# Patient Record
Sex: FEMALE | Race: WHITE | Employment: UNEMPLOYED | URBAN - METROPOLITAN AREA
[De-identification: names, ages, dates, MRNs, and addresses within clinical notes are randomized per-mention and may not be internally consistent; named-entity substitution may affect disease eponyms.]

---

## 2017-05-15 ENCOUNTER — OFFICE VISIT (OUTPATIENT)
Dept: FAMILY MEDICINE CLINIC | Age: 57
End: 2017-05-15
Payer: COMMERCIAL

## 2017-05-15 VITALS
DIASTOLIC BLOOD PRESSURE: 91 MMHG | OXYGEN SATURATION: 100 % | BODY MASS INDEX: 20.49 KG/M2 | HEART RATE: 86 BPM | RESPIRATION RATE: 10 BRPM | HEIGHT: 65 IN | WEIGHT: 123 LBS | SYSTOLIC BLOOD PRESSURE: 136 MMHG

## 2017-05-15 DIAGNOSIS — G43.719 INTRACTABLE CHRONIC MIGRAINE WITHOUT AURA AND WITHOUT STATUS MIGRAINOSUS: Primary | ICD-10-CM

## 2017-05-15 PROCEDURE — 99203 OFFICE O/P NEW LOW 30 MIN: CPT | Performed by: FAMILY MEDICINE

## 2017-05-15 RX ORDER — VERAPAMIL HYDROCHLORIDE 360 MG/1
360 CAPSULE, DELAYED RELEASE PELLETS ORAL NIGHTLY
Qty: 30 CAPSULE | Refills: 1 | Status: SHIPPED | OUTPATIENT
Start: 2017-05-15 | End: 2017-07-24 | Stop reason: DRUGHIGH

## 2017-05-15 RX ORDER — ACETAMINOPHEN 500 MG
500 TABLET ORAL 2 TIMES DAILY
COMMUNITY
End: 2020-01-02

## 2017-05-15 RX ORDER — MULTIVIT-MIN/IRON/FOLIC ACID/K 18-600-40
CAPSULE ORAL DAILY
COMMUNITY
End: 2017-12-11 | Stop reason: SDUPTHER

## 2017-05-15 RX ORDER — VERAPAMIL HYDROCHLORIDE 360 MG/1
360 CAPSULE, DELAYED RELEASE PELLETS ORAL NIGHTLY
COMMUNITY
End: 2017-05-15 | Stop reason: SDUPTHER

## 2017-05-15 RX ORDER — TOPIRAMATE 100 MG/1
TABLET, FILM COATED ORAL 2 TIMES DAILY
COMMUNITY
Start: 2017-03-20 | End: 2017-06-20 | Stop reason: SDUPTHER

## 2017-05-15 RX ORDER — BUTALBITAL, ACETAMINOPHEN AND CAFFEINE 50; 325; 40 MG/1; MG/1; MG/1
1 TABLET ORAL EVERY 6 HOURS PRN
Qty: 60 TABLET | Refills: 3 | Status: SHIPPED | OUTPATIENT
Start: 2017-05-15 | End: 2018-09-17 | Stop reason: SDUPTHER

## 2017-05-15 RX ORDER — AMITRIPTYLINE HYDROCHLORIDE 25 MG/1
TABLET, FILM COATED ORAL DAILY
COMMUNITY
Start: 2017-03-27 | End: 2018-02-02

## 2017-05-15 RX ORDER — HYDROCHLOROTHIAZIDE 12.5 MG/1
12.5 CAPSULE, GELATIN COATED ORAL DAILY
COMMUNITY
End: 2017-10-16 | Stop reason: ALTCHOICE

## 2017-05-15 RX ORDER — LOSARTAN POTASSIUM 50 MG/1
50 TABLET ORAL DAILY
COMMUNITY
End: 2018-01-12

## 2017-05-15 RX ORDER — LOSARTAN POTASSIUM 100 MG/1
50 TABLET ORAL
COMMUNITY
Start: 2017-03-24 | End: 2017-05-15 | Stop reason: DRUGHIGH

## 2017-05-15 RX ORDER — CYCLOBENZAPRINE HCL 5 MG
TABLET ORAL
COMMUNITY
Start: 2017-04-11 | End: 2017-10-16

## 2017-05-15 RX ORDER — BUSPIRONE HYDROCHLORIDE 15 MG/1
15 TABLET ORAL 2 TIMES DAILY
Status: ON HOLD | COMMUNITY
Start: 2017-03-24 | End: 2018-12-27

## 2017-05-15 ASSESSMENT — PATIENT HEALTH QUESTIONNAIRE - PHQ9
SUM OF ALL RESPONSES TO PHQ QUESTIONS 1-9: 0
SUM OF ALL RESPONSES TO PHQ9 QUESTIONS 1 & 2: 0
1. LITTLE INTEREST OR PLEASURE IN DOING THINGS: 0
2. FEELING DOWN, DEPRESSED OR HOPELESS: 0

## 2017-05-17 RX ORDER — SUMATRIPTAN 100 MG/1
100 TABLET, FILM COATED ORAL
Qty: 9 TABLET | Refills: 1 | Status: SHIPPED | OUTPATIENT
Start: 2017-05-17 | End: 2017-12-11 | Stop reason: SDUPTHER

## 2017-05-17 RX ORDER — POTASSIUM CHLORIDE 1.5 G/1.77G
10 POWDER, FOR SOLUTION ORAL 3 TIMES DAILY
Qty: 22 EACH | Refills: 1 | Status: SHIPPED | OUTPATIENT
Start: 2017-05-17 | End: 2017-10-16 | Stop reason: ALTCHOICE

## 2017-06-20 RX ORDER — TOPIRAMATE 100 MG/1
100 TABLET, FILM COATED ORAL 2 TIMES DAILY
Qty: 60 TABLET | Refills: 2 | Status: SHIPPED | OUTPATIENT
Start: 2017-06-20 | End: 2017-09-28 | Stop reason: SDUPTHER

## 2017-07-24 ENCOUNTER — OFFICE VISIT (OUTPATIENT)
Dept: NEUROLOGY | Age: 57
End: 2017-07-24
Payer: COMMERCIAL

## 2017-07-24 VITALS
HEART RATE: 73 BPM | SYSTOLIC BLOOD PRESSURE: 125 MMHG | BODY MASS INDEX: 21.13 KG/M2 | HEIGHT: 65 IN | WEIGHT: 126.8 LBS | DIASTOLIC BLOOD PRESSURE: 85 MMHG

## 2017-07-24 DIAGNOSIS — G43.719 INTRACTABLE CHRONIC MIGRAINE WITHOUT AURA AND WITHOUT STATUS MIGRAINOSUS: Primary | ICD-10-CM

## 2017-07-24 PROCEDURE — 99204 OFFICE O/P NEW MOD 45 MIN: CPT | Performed by: PSYCHIATRY & NEUROLOGY

## 2017-07-25 ENCOUNTER — HOSPITAL ENCOUNTER (OUTPATIENT)
Facility: CLINIC | Age: 57
Discharge: HOME | End: 2017-07-25
Payer: COMMERCIAL

## 2017-07-25 ENCOUNTER — HOSPITAL ENCOUNTER (OUTPATIENT)
Dept: GENERAL RADIOLOGY | Facility: CLINIC | Age: 57
Discharge: HOME | End: 2017-07-25
Payer: COMMERCIAL

## 2017-07-25 DIAGNOSIS — G43.719 INTRACTABLE CHRONIC MIGRAINE WITHOUT AURA AND WITHOUT STATUS MIGRAINOSUS: ICD-10-CM

## 2017-07-25 LAB
ALT SERPL-CCNC: 31 U/L (ref 5–33)
ANION GAP SERPL CALCULATED.3IONS-SCNC: 14 MMOL/L (ref 9–17)
BUN BLDV-MCNC: 17 MG/DL (ref 6–20)
CHLORIDE BLD-SCNC: 105 MMOL/L (ref 98–107)
CO2: 25 MMOL/L (ref 20–31)
CREAT SERPL-MCNC: 0.7 MG/DL (ref 0.5–0.9)
GFR AFRICAN AMERICAN: >60 ML/MIN
GFR NON-AFRICAN AMERICAN: >60 ML/MIN
GFR SERPL CREATININE-BSD FRML MDRD: NORMAL ML/MIN/{1.73_M2}
GFR SERPL CREATININE-BSD FRML MDRD: NORMAL ML/MIN/{1.73_M2}
HCT VFR BLD CALC: 46.1 % (ref 36–46)
HEMOGLOBIN: 15.9 G/DL (ref 12–16)
MCH RBC QN AUTO: 31.3 PG (ref 26–34)
MCHC RBC AUTO-ENTMCNC: 34.5 G/DL (ref 31–37)
MCV RBC AUTO: 90.9 FL (ref 80–100)
PDW BLD-RTO: 13.3 % (ref 12.5–15.4)
PLATELET # BLD: 167 K/UL (ref 140–450)
PMV BLD AUTO: 10 FL (ref 6–12)
POTASSIUM SERPL-SCNC: 3.9 MMOL/L (ref 3.7–5.3)
RBC # BLD: 5.07 M/UL (ref 4–5.2)
SODIUM BLD-SCNC: 144 MMOL/L (ref 135–144)
TSH SERPL DL<=0.05 MIU/L-ACNC: 1.16 MIU/L (ref 0.3–5)
WBC # BLD: 7 K/UL (ref 3.5–11)

## 2017-07-25 PROCEDURE — 84520 ASSAY OF UREA NITROGEN: CPT

## 2017-07-25 PROCEDURE — 82746 ASSAY OF FOLIC ACID SERUM: CPT

## 2017-07-25 PROCEDURE — 72040 X-RAY EXAM NECK SPINE 2-3 VW: CPT

## 2017-07-25 PROCEDURE — 84443 ASSAY THYROID STIM HORMONE: CPT

## 2017-07-25 PROCEDURE — 84460 ALANINE AMINO (ALT) (SGPT): CPT

## 2017-07-25 PROCEDURE — 82607 VITAMIN B-12: CPT

## 2017-07-25 PROCEDURE — 85027 COMPLETE CBC AUTOMATED: CPT

## 2017-07-25 PROCEDURE — 80051 ELECTROLYTE PANEL: CPT

## 2017-07-25 PROCEDURE — 36415 COLL VENOUS BLD VENIPUNCTURE: CPT

## 2017-07-25 PROCEDURE — 85651 RBC SED RATE NONAUTOMATED: CPT

## 2017-07-25 PROCEDURE — 82565 ASSAY OF CREATININE: CPT

## 2017-07-26 ENCOUNTER — TELEPHONE (OUTPATIENT)
Dept: NEUROLOGY | Age: 57
End: 2017-07-26

## 2017-07-26 LAB
FOLATE: >20 NG/ML
SEDIMENTATION RATE, ERYTHROCYTE: 1 MM (ref 0–20)
VITAMIN B-12: 1069 PG/ML (ref 211–946)

## 2017-08-21 ENCOUNTER — OFFICE VISIT (OUTPATIENT)
Dept: FAMILY MEDICINE CLINIC | Age: 57
End: 2017-08-21
Payer: COMMERCIAL

## 2017-08-21 VITALS
SYSTOLIC BLOOD PRESSURE: 134 MMHG | HEART RATE: 77 BPM | HEIGHT: 65 IN | OXYGEN SATURATION: 100 % | WEIGHT: 124 LBS | BODY MASS INDEX: 20.66 KG/M2 | DIASTOLIC BLOOD PRESSURE: 84 MMHG

## 2017-08-21 DIAGNOSIS — I10 ESSENTIAL HYPERTENSION: ICD-10-CM

## 2017-08-21 DIAGNOSIS — G44.221 CHRONIC TENSION-TYPE HEADACHE, INTRACTABLE: Primary | ICD-10-CM

## 2017-08-21 PROCEDURE — 99214 OFFICE O/P EST MOD 30 MIN: CPT | Performed by: FAMILY MEDICINE

## 2017-08-21 RX ORDER — BACLOFEN 10 MG/1
10 TABLET ORAL 3 TIMES DAILY
Qty: 60 TABLET | Refills: 0 | Status: SHIPPED | OUTPATIENT
Start: 2017-08-21 | End: 2017-10-18 | Stop reason: SDUPTHER

## 2017-08-21 RX ORDER — TRAMADOL HYDROCHLORIDE 50 MG/1
50 TABLET ORAL DAILY
Qty: 20 TABLET | Refills: 0 | Status: SHIPPED | OUTPATIENT
Start: 2017-08-21 | End: 2017-09-10

## 2017-08-26 ENCOUNTER — HOSPITAL ENCOUNTER (OUTPATIENT)
Facility: CLINIC | Age: 57
Discharge: HOME | End: 2017-08-26
Payer: COMMERCIAL

## 2017-08-26 DIAGNOSIS — G44.221 CHRONIC TENSION-TYPE HEADACHE, INTRACTABLE: ICD-10-CM

## 2017-08-26 LAB
ABSOLUTE EOS #: 0.3 K/UL (ref 0–0.4)
ABSOLUTE LYMPH #: 2.4 K/UL (ref 1–4.8)
ABSOLUTE MONO #: 0.5 K/UL (ref 0.1–1.2)
ALBUMIN SERPL-MCNC: 4.4 G/DL (ref 3.5–5.2)
ALBUMIN/GLOBULIN RATIO: 1.4 (ref 1–2.5)
ALP BLD-CCNC: 96 U/L (ref 35–104)
ALT SERPL-CCNC: 28 U/L (ref 5–33)
ANION GAP SERPL CALCULATED.3IONS-SCNC: 10 MMOL/L (ref 9–17)
AST SERPL-CCNC: 26 U/L
BASOPHILS # BLD: 0 %
BASOPHILS ABSOLUTE: 0 K/UL (ref 0–0.2)
BILIRUB SERPL-MCNC: 0.6 MG/DL (ref 0.3–1.2)
BILIRUBIN URINE: NEGATIVE
BUN BLDV-MCNC: 12 MG/DL (ref 6–20)
BUN/CREAT BLD: NORMAL (ref 9–20)
CALCIUM SERPL-MCNC: 9.8 MG/DL (ref 8.6–10.4)
CHLORIDE BLD-SCNC: 105 MMOL/L (ref 98–107)
CHOLESTEROL/HDL RATIO: 3.8
CHOLESTEROL: 200 MG/DL
CO2: 26 MMOL/L (ref 20–31)
COLOR: YELLOW
COMMENT UA: NORMAL
CREAT SERPL-MCNC: 0.64 MG/DL (ref 0.5–0.9)
DIFFERENTIAL TYPE: ABNORMAL
EOSINOPHILS RELATIVE PERCENT: 5 %
GFR AFRICAN AMERICAN: >60 ML/MIN
GFR NON-AFRICAN AMERICAN: >60 ML/MIN
GFR SERPL CREATININE-BSD FRML MDRD: NORMAL ML/MIN/{1.73_M2}
GFR SERPL CREATININE-BSD FRML MDRD: NORMAL ML/MIN/{1.73_M2}
GLUCOSE BLD-MCNC: 90 MG/DL (ref 70–99)
GLUCOSE URINE: NEGATIVE
HCT VFR BLD CALC: 48.4 % (ref 36–46)
HDLC SERPL-MCNC: 53 MG/DL
HEMOGLOBIN: 16.5 G/DL (ref 12–16)
KETONES, URINE: NEGATIVE
LDL CHOLESTEROL: 135 MG/DL (ref 0–130)
LEUKOCYTE ESTERASE, URINE: NEGATIVE
LYMPHOCYTES # BLD: 42 %
MCH RBC QN AUTO: 31.2 PG (ref 26–34)
MCHC RBC AUTO-ENTMCNC: 34 G/DL (ref 31–37)
MCV RBC AUTO: 91.7 FL (ref 80–100)
MONOCYTES # BLD: 9 %
NITRITE, URINE: NEGATIVE
PDW BLD-RTO: 13.5 % (ref 12.5–15.4)
PH UA: 8 (ref 5–8)
PLATELET # BLD: 204 K/UL (ref 140–450)
PLATELET ESTIMATE: ABNORMAL
PMV BLD AUTO: 10.3 FL (ref 6–12)
POTASSIUM SERPL-SCNC: 4.1 MMOL/L (ref 3.7–5.3)
PROTEIN UA: NEGATIVE
RBC # BLD: 5.28 M/UL (ref 4–5.2)
RBC # BLD: ABNORMAL 10*6/UL
SEG NEUTROPHILS: 44 %
SEGMENTED NEUTROPHILS ABSOLUTE COUNT: 2.5 K/UL (ref 1.8–7.7)
SODIUM BLD-SCNC: 141 MMOL/L (ref 135–144)
SPECIFIC GRAVITY UA: 1.01 (ref 1–1.03)
THYROXINE, FREE: 1.08 NG/DL (ref 0.93–1.7)
TOTAL PROTEIN: 7.5 G/DL (ref 6.4–8.3)
TRIGL SERPL-MCNC: 62 MG/DL
TSH SERPL DL<=0.05 MIU/L-ACNC: 1.75 MIU/L (ref 0.3–5)
TURBIDITY: CLEAR
URINE HGB: NEGATIVE
UROBILINOGEN, URINE: NORMAL
VLDLC SERPL CALC-MCNC: ABNORMAL MG/DL (ref 1–30)
WBC # BLD: 5.7 K/UL (ref 3.5–11)
WBC # BLD: ABNORMAL 10*3/UL

## 2017-08-26 PROCEDURE — 84443 ASSAY THYROID STIM HORMONE: CPT

## 2017-08-26 PROCEDURE — 36415 COLL VENOUS BLD VENIPUNCTURE: CPT

## 2017-08-26 PROCEDURE — 80061 LIPID PANEL: CPT

## 2017-08-26 PROCEDURE — 80053 COMPREHEN METABOLIC PANEL: CPT

## 2017-08-26 PROCEDURE — 85025 COMPLETE CBC W/AUTO DIFF WBC: CPT

## 2017-08-26 PROCEDURE — 84439 ASSAY OF FREE THYROXINE: CPT

## 2017-08-26 PROCEDURE — 86376 MICROSOMAL ANTIBODY EACH: CPT

## 2017-08-26 PROCEDURE — 81003 URINALYSIS AUTO W/O SCOPE: CPT

## 2017-08-28 LAB — THYROID PEROXIDASE (TPO) AB: 32.8 IU/ML (ref 0–35)

## 2017-08-29 RX ORDER — PRAVASTATIN SODIUM 40 MG
40 TABLET ORAL DAILY
Qty: 30 TABLET | Refills: 3 | Status: SHIPPED | OUTPATIENT
Start: 2017-08-29 | End: 2017-12-11 | Stop reason: SDUPTHER

## 2017-09-28 DIAGNOSIS — G43.719 INTRACTABLE CHRONIC MIGRAINE WITHOUT AURA AND WITHOUT STATUS MIGRAINOSUS: ICD-10-CM

## 2017-09-29 RX ORDER — TOPIRAMATE 100 MG/1
100 TABLET, FILM COATED ORAL 2 TIMES DAILY
Qty: 60 TABLET | Refills: 2 | Status: SHIPPED | OUTPATIENT
Start: 2017-09-29 | End: 2017-11-14

## 2017-10-16 ENCOUNTER — OFFICE VISIT (OUTPATIENT)
Dept: NEUROLOGY | Age: 57
End: 2017-10-16
Payer: COMMERCIAL

## 2017-10-16 ENCOUNTER — TELEPHONE (OUTPATIENT)
Dept: NEUROLOGY | Age: 57
End: 2017-10-16

## 2017-10-16 VITALS
HEIGHT: 65 IN | WEIGHT: 126.4 LBS | BODY MASS INDEX: 21.06 KG/M2 | DIASTOLIC BLOOD PRESSURE: 91 MMHG | HEART RATE: 71 BPM | SYSTOLIC BLOOD PRESSURE: 148 MMHG

## 2017-10-16 DIAGNOSIS — G43.719 INTRACTABLE CHRONIC MIGRAINE WITHOUT AURA AND WITHOUT STATUS MIGRAINOSUS: Primary | ICD-10-CM

## 2017-10-16 PROCEDURE — 99213 OFFICE O/P EST LOW 20 MIN: CPT | Performed by: PSYCHIATRY & NEUROLOGY

## 2017-10-16 NOTE — PROGRESS NOTES
Stiffness: absent, Muscle pain: absent, Joint pain: absent, Restless legs: absent   DERMATOLOGIC Hair loss: absent, Skin changes: absent   NEUROLOGIC Memory loss: absent, Confusion: absent, Seizures: absent Trouble walking or imbalance: absent, Dizziness: absent, Weakness: absent, Numbness: absent, Tremor: absent, Spasm: absent, Speech difficulty: absent, Headache: absent, Light sensitivity: absent   PSYCHIATRIC Anxiety: absent, Hallucination: absent, Mood disorder: absent   HEMATOLOGIC Abnormal bleeding: absent, Anemia: absent, Clotting disorder: absent, Lymph gland changes: absent                   Outpatient Prescriptions Marked as Taking for the 10/16/17 encounter (Office Visit) with Santino Ayala MD   Medication Sig Dispense Refill    topiramate (TOPAMAX) 100 MG tablet Take 1 tablet by mouth 2 times daily 60 tablet 2    pravastatin (PRAVACHOL) 40 MG tablet Take 1 tablet by mouth daily 30 tablet 3    baclofen (LIORESAL) 10 MG tablet Take 1 tablet by mouth 3 times daily 60 tablet 0    busPIRone (BUSPAR) 15 MG tablet 15 mg 2 times daily       amitriptyline (ELAVIL) 25 MG tablet daily       losartan (COZAAR) 50 MG tablet Take 50 mg by mouth daily      Cholecalciferol (VITAMIN D) 2000 UNITS CAPS capsule Take by mouth daily      Calcium Citrate-Vitamin D (CALCIUM CITRATE + PO) Take by mouth 2 times daily      acetaminophen (TYLENOL) 500 MG tablet Take 500 mg by mouth 2 times daily Indications: 2 PO BID      butalbital-acetaminophen-caffeine (FIORICET, ESGIC) -40 MG per tablet Take 1 tablet by mouth every 6 hours as needed for Headaches or Migraine 60 tablet 3                                          PHYSICAL EXAMINATION                                              .                                                                                                     General Appearance:  Alert, cooperative, no signs of distress despite a headache calendar showing 3-4 headaches a week, appears stated her complaints. Considering the mental confusion and the lack of effectiveness of her Topamax I have advised a gradual withdrawal by 50 mg weekly until she is off the drug. There is no need for additional investigation but I have come up with a few other ideas about treatment. For starters, once the Topamax is discontinued, she will escalate the Amitriptyline to 50 mg at bedtime. She will keep up the Melatonin and Magnesium Oxide and consider another nonprescription preventative agent, Butterbur. She can purchase this at a [employer] store. Meanwhile, she will keep up her headache calendars and the Imitrex abortive therapy since it continues to be effective. I also suggested she try a cup of coffee or a beverage with caffeine as an abortive agent when there is a headache breakthrough. Should none of this prove effective, I have asked her to reconsider a trial of Botox injections since she only had one course of this therapy and sometimes it takes more than the first treatment to make a difference. In the meantime, I will remain available for questions or any other issues that may arise and she will return for reevaluation in about 3 months time.

## 2017-10-16 NOTE — COMMUNICATION BODY
HPI:        Your patient, Robb Max returns for continuing neurologic care of her chronic migraine disorder. As you recall, she suffers with a long-standing migraine disorder evaluated by multiple neurologists in the past.  A MRI of the brain and MRA of the brain in October 2016 were normal.  A cervical spine x-ray in July 2017 revealed straightening with mild degenerative changes, mostly at C4-C6. Laboratories in July 2017 included a normal ESR, B12, folate, TSH, electrolytes, BUN, creatinine, ALT and CBC. She has been treated in the past with a myriad of migraine preventative and abortive therapies. On last visit, Verapamil was producing leg swelling and constipation. Consequently, it was reduced and withdrawn in July 2017. She was also advised to begin a trial of Melatonin and Magnesium Oxide. Unfortunately nothing has changed. She still suffers 3-4 times a week with a migraine and sometimes they last as long as 3-4 days. Unfortunately she has tried just about every medication and even Botox therapy without lasting benefit and doesn't know what to do next. The Topamax is producing some side effects of mental confusion and she would like to stop this. Fortunately, there have been no new medical or surgical issues nor any recent trauma, infection/fever or intoxication to complicate matters.                                                                                                           REVIEW OF SYSTEMS    CONSTITUTIONAL Weight: absent, Appetite: absent, Fatigue: absent      HEENT Ears: ringing in ears, Eyes: no corrective lenses, Visual disturbance: absent   RESPIRATORY Shortness of breath: absent, Cough: absent   CARDIOVASCULAR Chest pain: absent, Leg swelling :absent      GI Constipation: absent, Diarrhea: absent, Swallowing change: absent       Urinary frequency: absent, Urinary urgency: absent, Urinary incontinence: absent   MUSCULOSKELETAL Neck pain: absent, Back pain: absent,

## 2017-10-16 NOTE — LETTER
tablet Take 1 tablet by mouth every 6 hours as needed for Headaches or Migraine 60 tablet 3                                          PHYSICAL EXAMINATION                                              .                                                                                                     General Appearance:  Alert, cooperative, no signs of distress despite a headache calendar showing 3-4 headaches a week, appears stated age, fit for age, well dressed and groomed    Head:  Normocephalic, no signs of trauma   Eyes:  Conjunctiva/corneas clear;  eyelids intact   Ears:  Normal external ear and canals   Nose: Nares normal, mucosa normal, no drainage    Throat: Lips and tongue normal; teeth normal;  gums normal   Neck: Supple neck with intact flexion, extension and rotation;   trachea midline;  no adenopathy;   thyroid: not enlarged;   no carotid pulse abnormality   Back:   Symmetric, no curvature, ROM adequate   Lungs:   Respirations unlabored   Chest Wall:  No deformity   Heart:  Regular rate and rhythm   Abdomen:   No masses   Extremities: Extremities normal, no cyanosis, no edema   Pulses: Symmetric over head and neck   Skin: Skin color, texture normal, no rashes, no lesions   Lymph nodes: Cervical, supraclavicular nodes normal                                         NEUROLOGIC EXAMINATION    MENTAL STATUS: Alert, oriented, intact memory, no confusion, normal speech, normal language, no hallucination or delusion   CRANIAL NERVES: II     -      Visual fields intact to confrontation  III,IV,VI -  EOMs full, no afferent defect, no                      MICHAEL, no ptosis  V     -     Normal facial sensation  VII    -     Normal facial symmetry  VIII   -     Intact hearing  IX,X -     Symmetrical palate  XI    -     Symmetrical shoulder shrug  XII   -     Midline tongue, no atrophy    MOTOR FUNCTION: Normal bulk, normal tone, normal power;  no involuntary movements, no tremor SENSORY FUNCTION: Normal touch, normal pin, normal vibration   CEREBELLAR FUNCTION: Intact fine motor control over upper limbs and good alternating movements   REFLEX FUNCTION: Symmetric, no perverted reflex   STATION and GAIT Normal station, normal gait                ASSESSMENT and  PLAN:      In summary, your patient, Manuel Galeana appears unchanged. There are no new lateralizing or localizing neurologic deficits or signs of toxicity from her medications despite her complaints. Considering the mental confusion and the lack of effectiveness of her Topamax I have advised a gradual withdrawal by 50 mg weekly until she is off the drug. There is no need for additional investigation but I have come up with a few other ideas about treatment. For starters, once the Topamax is discontinued, she will escalate the Amitriptyline to 50 mg at bedtime. She will keep up the Melatonin and Magnesium Oxide and consider another nonprescription preventative agent, Butterbur. She can purchase this at a [employer] store. Meanwhile, she will keep up her headache calendars and the Imitrex abortive therapy since it continues to be effective. I also suggested she try a cup of coffee or a beverage with caffeine as an abortive agent when there is a headache breakthrough. Should none of this prove effective, I have asked her to reconsider a trial of Botox injections since she only had one course of this therapy and sometimes it takes more than the first treatment to make a difference. In the meantime, I will remain available for questions or any other issues that may arise and she will return for reevaluation in about 3 months time. If you have questions, please do not hesitate to call me. I look forward to following Maycol Pandya along with you.     Sincerely,        Thai Hodges MD

## 2017-10-18 DIAGNOSIS — G44.221 CHRONIC TENSION-TYPE HEADACHE, INTRACTABLE: ICD-10-CM

## 2017-10-19 RX ORDER — BACLOFEN 10 MG/1
10 TABLET ORAL 3 TIMES DAILY
Qty: 60 TABLET | Refills: 0 | Status: SHIPPED | OUTPATIENT
Start: 2017-10-19 | End: 2017-12-11 | Stop reason: SDUPTHER

## 2017-11-14 ENCOUNTER — HOSPITAL ENCOUNTER (EMERGENCY)
Facility: CLINIC | Age: 57
Discharge: HOME | End: 2017-11-14
Attending: EMERGENCY MEDICINE
Payer: COMMERCIAL

## 2017-11-14 ENCOUNTER — APPOINTMENT (OUTPATIENT)
Dept: CT IMAGING | Facility: CLINIC | Age: 57
End: 2017-11-14
Payer: COMMERCIAL

## 2017-11-14 VITALS
DIASTOLIC BLOOD PRESSURE: 85 MMHG | TEMPERATURE: 98.3 F | WEIGHT: 125 LBS | HEIGHT: 65 IN | BODY MASS INDEX: 20.83 KG/M2 | SYSTOLIC BLOOD PRESSURE: 142 MMHG | OXYGEN SATURATION: 96 % | HEART RATE: 86 BPM | RESPIRATION RATE: 11 BRPM

## 2017-11-14 DIAGNOSIS — I10 HYPERTENSION, UNSPECIFIED TYPE: Primary | ICD-10-CM

## 2017-11-14 LAB
ABSOLUTE EOS #: 0.3 K/UL (ref 0–0.4)
ABSOLUTE IMMATURE GRANULOCYTE: ABNORMAL K/UL (ref 0–0.3)
ABSOLUTE LYMPH #: 2.3 K/UL (ref 1–4.8)
ABSOLUTE MONO #: 0.6 K/UL (ref 0.1–1.2)
ALBUMIN SERPL-MCNC: 4.3 G/DL (ref 3.5–5.2)
ALBUMIN/GLOBULIN RATIO: 1.3 (ref 1–2.5)
ALP BLD-CCNC: 94 U/L (ref 35–104)
ALT SERPL-CCNC: 60 U/L (ref 5–33)
ANION GAP SERPL CALCULATED.3IONS-SCNC: 15 MMOL/L (ref 9–17)
AST SERPL-CCNC: 41 U/L
BASOPHILS # BLD: 1 %
BASOPHILS ABSOLUTE: 0 K/UL (ref 0–0.2)
BILIRUB SERPL-MCNC: 0.2 MG/DL (ref 0.3–1.2)
BILIRUBIN URINE: NEGATIVE
BUN BLDV-MCNC: 15 MG/DL (ref 6–20)
BUN/CREAT BLD: ABNORMAL (ref 9–20)
CALCIUM SERPL-MCNC: 9.9 MG/DL (ref 8.6–10.4)
CHLORIDE BLD-SCNC: 103 MMOL/L (ref 98–107)
CO2: 24 MMOL/L (ref 20–31)
COLOR: YELLOW
COMMENT UA: NORMAL
CREAT SERPL-MCNC: 0.6 MG/DL (ref 0.5–0.9)
DIFFERENTIAL TYPE: ABNORMAL
EKG ATRIAL RATE: 89 BPM
EKG P AXIS: 73 DEGREES
EKG P-R INTERVAL: 164 MS
EKG Q-T INTERVAL: 352 MS
EKG QRS DURATION: 84 MS
EKG QTC CALCULATION (BAZETT): 428 MS
EKG R AXIS: 59 DEGREES
EKG T AXIS: 49 DEGREES
EKG VENTRICULAR RATE: 89 BPM
EOSINOPHILS RELATIVE PERCENT: 4 %
GFR AFRICAN AMERICAN: >60 ML/MIN
GFR NON-AFRICAN AMERICAN: >60 ML/MIN
GFR SERPL CREATININE-BSD FRML MDRD: ABNORMAL ML/MIN/{1.73_M2}
GFR SERPL CREATININE-BSD FRML MDRD: ABNORMAL ML/MIN/{1.73_M2}
GLUCOSE BLD-MCNC: 113 MG/DL (ref 70–99)
GLUCOSE URINE: NEGATIVE
HCT VFR BLD CALC: 49.5 % (ref 36–46)
HEMOGLOBIN: 17 G/DL (ref 12–16)
IMMATURE GRANULOCYTES: ABNORMAL %
KETONES, URINE: NEGATIVE
LEUKOCYTE ESTERASE, URINE: NEGATIVE
LYMPHOCYTES # BLD: 33 %
MCH RBC QN AUTO: 31.6 PG (ref 26–34)
MCHC RBC AUTO-ENTMCNC: 34.3 G/DL (ref 31–37)
MCV RBC AUTO: 92.1 FL (ref 80–100)
MONOCYTES # BLD: 9 %
NITRITE, URINE: NEGATIVE
PDW BLD-RTO: 12.2 % (ref 12.5–15.4)
PH UA: 6.5 (ref 5–8)
PLATELET # BLD: 243 K/UL (ref 140–450)
PLATELET ESTIMATE: ABNORMAL
PMV BLD AUTO: 9.7 FL (ref 6–12)
POTASSIUM SERPL-SCNC: 3.8 MMOL/L (ref 3.7–5.3)
PROTEIN UA: NEGATIVE
RBC # BLD: 5.37 M/UL (ref 4–5.2)
RBC # BLD: ABNORMAL 10*6/UL
SEG NEUTROPHILS: 53 %
SEGMENTED NEUTROPHILS ABSOLUTE COUNT: 3.7 K/UL (ref 1.8–7.7)
SODIUM BLD-SCNC: 142 MMOL/L (ref 135–144)
SPECIFIC GRAVITY UA: 1.01 (ref 1–1.03)
TOTAL PROTEIN: 7.5 G/DL (ref 6.4–8.3)
TROPONIN INTERP: NORMAL
TROPONIN T: <0.03 NG/ML
TURBIDITY: CLEAR
URINE HGB: NEGATIVE
UROBILINOGEN, URINE: NORMAL
WBC # BLD: 6.9 K/UL (ref 3.5–11)
WBC # BLD: ABNORMAL 10*3/UL

## 2017-11-14 PROCEDURE — 36415 COLL VENOUS BLD VENIPUNCTURE: CPT

## 2017-11-14 PROCEDURE — 6370000000 HC RX 637 (ALT 250 FOR IP): Performed by: EMERGENCY MEDICINE

## 2017-11-14 PROCEDURE — 99284 EMERGENCY DEPT VISIT MOD MDM: CPT

## 2017-11-14 PROCEDURE — 84484 ASSAY OF TROPONIN QUANT: CPT

## 2017-11-14 PROCEDURE — 85025 COMPLETE CBC W/AUTO DIFF WBC: CPT

## 2017-11-14 PROCEDURE — 80053 COMPREHEN METABOLIC PANEL: CPT

## 2017-11-14 PROCEDURE — 70450 CT HEAD/BRAIN W/O DYE: CPT

## 2017-11-14 PROCEDURE — 93005 ELECTROCARDIOGRAM TRACING: CPT

## 2017-11-14 RX ORDER — CLONIDINE HYDROCHLORIDE 0.1 MG/1
0.1 TABLET ORAL ONCE
Status: COMPLETED | OUTPATIENT
Start: 2017-11-14 | End: 2017-11-14

## 2017-11-14 RX ADMIN — CLONIDINE HYDROCHLORIDE 0.1 MG: 0.1 TABLET ORAL at 18:31

## 2017-11-14 ASSESSMENT — PAIN DESCRIPTION - PAIN TYPE: TYPE: ACUTE PAIN

## 2017-11-14 ASSESSMENT — PAIN SCALES - GENERAL: PAINLEVEL_OUTOF10: 3

## 2017-11-14 ASSESSMENT — PAIN DESCRIPTION - LOCATION: LOCATION: HEAD

## 2017-11-14 NOTE — ED PROVIDER NOTES
[facility] ED  [address] [address]  Phone: [phone]      Pt Name: Vidal Clark  MRN: [id number]  [place] 1960  Date of evaluation: 2017      CHIEF COMPLAINT       Chief Complaint   Patient presents with    Hypertension       HISTORY OF PRESENT ILLNESS    55-year-old female with a history of hypertension presents to the emergency department today concern of hypertension. She read an article in the newspaper today stating that they have lowered the standards for evaluation treatment and management of hypertension. This prompted her to check her blood pressure. It was considerably elevated. She called her doctor. Her doctor told her to go and have it evaluated at a pharmacy. It was still elevated and therefore she presents here. She does report a headache which is global.  She commonly gets headaches but this is a little worse than normal.  She does describe palpitations in her chest.  No nausea vomiting or diaphoresis. She tells me that she is on a blood pressure medication but has not checked her blood pressure in some time. She was on additional medications but stopped them because of a cough. No additional blood pressure medication was used to replace the one that was discontinued. REVIEW OF SYSTEMS     Review of Systems   All other systems reviewed and are negative. PAST MEDICAL HISTORY    has a past medical history of Acid reflux; Anemia; Hypertension; Migraine; Mitral valve prolapse; and Osteoarthritis. SURGICAL HISTORY      has a past surgical history that includes Ovary removal; Toe Surgery;  section; and other surgical history.     CURRENT MEDICATIONS       Previous Medications    ACETAMINOPHEN (TYLENOL) 500 MG TABLET    Take 500 mg by mouth 2 times daily Indications: 2 PO BID    AMITRIPTYLINE (ELAVIL) 25 MG TABLET    daily     BACLOFEN (LIORESAL) 10 MG TABLET    Take 1 tablet by mouth 3 times daily    BUSPIRONE (BUSPAR) 15 MG TABLET cervical adenopathy. Neurological: She is alert and oriented to person, place, and time. No cranial nerve deficit. She exhibits normal muscle tone. Coordination normal.   Skin: Skin is warm and dry. No rash noted. I did not undress this patient. Psychiatric: She has a normal mood and affect. Her behavior is normal. Thought content normal.   Vitals reviewed. DIFFERENTIAL DIAGNOSIS/ MDM:   Patient here is neurologically intact. I will investigate for end organ damage. I will do serial evaluations. I will treat her elevated blood pressure here with clonidine orally. DIAGNOSTIC RESULTS     EKG:  EKG as interpreted by myself showing a normal sinus rhythm without any acute ST segment changes. Rate axis and intervals are all normal.    RADIOLOGY:   No results found. LABS:  No results found for this visit on 11/14/17. EMERGENCY DEPARTMENT COURSE:     The patient was given the following medications:  Orders Placed This Encounter   Medications    cloNIDine (CATAPRES) tablet 0.1 mg        Vitals:    Vitals:    11/14/17 1806   BP: (!) 197/107   Pulse: 106   Resp: 16   Temp: 98.3 °F (36.8 °C)   TempSrc: Oral   SpO2: 100%   Weight: 56.7 kg (125 lb)   Height: 5' 5\" (1.651 m)     -------------------------  BP: (!) 197/107, Temp: 98.3 °F (36.8 °C), Pulse: 106, Resp: 16      Re-evaluation Notes  It is now 1915 hrs. and I have endorsed this patient to Dr. Fawn Leon. Please see his addendum.     (Please note that portions of this note were completed with a voice recognition program.  Efforts were made to edit the dictations but occasionally words are mis-transcribed.)    Essie Mcarthur MD, F.A.C.E.P, F.A.A.E.M  Emergency Physician Attending          Essie Mcarthur MD  11/14/17 9124

## 2017-11-15 ENCOUNTER — OFFICE VISIT (OUTPATIENT)
Dept: FAMILY MEDICINE CLINIC | Age: 57
End: 2017-11-15
Payer: COMMERCIAL

## 2017-11-15 VITALS
DIASTOLIC BLOOD PRESSURE: 95 MMHG | BODY MASS INDEX: 20.83 KG/M2 | HEART RATE: 91 BPM | WEIGHT: 125 LBS | SYSTOLIC BLOOD PRESSURE: 171 MMHG | OXYGEN SATURATION: 99 % | HEIGHT: 65 IN

## 2017-11-15 DIAGNOSIS — I10 ESSENTIAL HYPERTENSION: Primary | ICD-10-CM

## 2017-11-15 PROCEDURE — 99213 OFFICE O/P EST LOW 20 MIN: CPT | Performed by: FAMILY MEDICINE

## 2017-11-15 RX ORDER — CALCIUM CARBONATE/VITAMIN D3 500-10/5ML
400 LIQUID (ML) ORAL
COMMUNITY
End: 2022-06-20

## 2017-11-15 RX ORDER — SPIRONOLACTONE 50 MG/1
50 TABLET, FILM COATED ORAL DAILY
Qty: 30 TABLET | Refills: 3 | Status: SHIPPED | OUTPATIENT
Start: 2017-11-15 | End: 2018-01-12 | Stop reason: SDUPTHER

## 2017-11-15 NOTE — PROGRESS NOTES
General FM note    Suri Alejo is a 62 y.o. female who presents today for follow up on her  medical conditions as noted below. Suri Alejo is c/o of   Chief Complaint   Patient presents with    Follow[facility] from [facility]     ED follow up from  [facility]       Patient Active Problem List:     Intractable chronic migraine without aura and without status migrainosus     Past Medical History:   Diagnosis Date    Acid reflux     Anemia     Hypertension     Migraine     Mitral valve prolapse     Osteoarthritis       Past Surgical History:   Procedure Laterality Date     SECTION      OTHER SURGICAL HISTORY      uterine ablation    OVARY REMOVAL      TOE SURGERY       Family History   Problem Relation Age of Onset    Neuropathy Mother     Heart Attack Father      Current Outpatient Prescriptions   Medication Sig Dispense Refill    Magnesium Oxide 400 MG CAPS Take 400 mg by mouth      MELATONIN-PYRIDOXINE PO Take by mouth      spironolactone (ALDACTONE) 50 MG tablet Take 1 tablet by mouth daily 30 tablet 3    Misc.  Devices MISC 1 each by Does not apply route once for 1 dose 1 each 0    baclofen (LIORESAL) 10 MG tablet Take 1 tablet by mouth 3 times daily 60 tablet 0    pravastatin (PRAVACHOL) 40 MG tablet Take 1 tablet by mouth daily 30 tablet 3    busPIRone (BUSPAR) 15 MG tablet 15 mg 2 times daily       amitriptyline (ELAVIL) 25 MG tablet daily       losartan (COZAAR) 50 MG tablet Take 50 mg by mouth daily      Cholecalciferol (VITAMIN D) 2000 UNITS CAPS capsule Take by mouth daily      Calcium Citrate-Vitamin D (CALCIUM CITRATE + PO) Take by mouth 2 times daily      acetaminophen (TYLENOL) 500 MG tablet Take 500 mg by mouth 2 times daily Indications: 2 PO BID      butalbital-acetaminophen-caffeine (FIORICET, ESGIC) -40 MG per tablet Take 1 tablet by mouth every 6 hours as needed for Headaches or Migraine 60 tablet 3    SUMAtriptan (IMITREX) 100 MG tablet Take 1 tablet by mouth once as needed for Migraine Indications: 50/100mg prn 9 tablet 1     No current facility-administered medications for this visit. ALLERGIES:    Allergies   Allergen Reactions    Percocet [Oxycodone-Acetaminophen] Hives       Social History   Substance Use Topics    Smoking status: Never Smoker    Smokeless tobacco: Never Used    Alcohol use No      Body mass index is 20.8 kg/m². BP (!) 171/95   Pulse 91   Ht 5' 5\" (1.651 m)   Wt 125 lb (56.7 kg)   SpO2 99%   BMI 20.80 kg/m²     Subjective:      HPI    49-year-old female coming in today because of high blood pressure. She was seen in the ER just some days ago because resident paper that she needs to check her blood pressure when she checked her blood pressure to blood pressure was quite high. The patient has been taking care off her 1year-old twins and also 11year-old grand kid. She tells me that quite a lot of work. Patient takes care of them 10 days hour. Hypertension  Patient is here for evaluation of elevated blood pressures. Age at onset of elevated blood pressure:  ??. Cardiac symptoms: none. Patient denies chest pain, chest pressure/discomfort, exertional chest pressure/discomfort, irregular heart beat, lower extremity edema and near-syncope. Cardiovascular risk factors: none. Use of agents associated with hypertension: none. History of target organ damage: none. Patient tells me that she was in multiple blood pressure medication before and she always had side effects. She does not want to see cardiologist.  She also tells me that she has on and off, some chest discomfort but this was worked up in the ER - the EKG was with normal limits. Review of Systems   Constitutional: Negative for fever and unexpected weight change. Respiratory: Negative for cough and shortness of breath.   positive for chest discomfort which resolved. Cardiovascular: Negative for chest pain and leg swelling.    Gastrointestinal: Negative for diarrhea, constipation and blood in stool. Skin: Negative for color change and rash. Objective:   Physical Exam  Constitutional: VS (see above). General appearance: normal development, habitus and attention, no deformities. Patient is very stressed. Eyes: normal conjunctiva and lids. CAV: RRR, no RMG. No edema lower extremities. Pulmo: CTA bilateral, no CWR. Skin: no rashes, lesions or ulcers. Musculoskeletal: normal gait. Nails: no clubbing or cyanosis. Psychiatric: alert and oriented to place, time and person. Normal mood and affect. Assessment:      1. Essential hypertension  spironolactone (ALDACTONE) 50 MG tablet    Potassium       Plan:   Start patient on aldactone. I discussed with her side effects. I also did give her a printout about clonazepam.  This patient might be a candidate for more sedating/relaxing medication. Patient will call if blood pressure reading after starting new medication. See me back in 4 weeks. Call if there is anything needed. Call or return to clinic prn if these symptoms worsen or fail to improve as anticipated. I have reviewed the instructions with the patient, answering all questions to her satisfaction. Return in about 4 weeks (around 2017) for HTN. Orders Placed This Encounter   Procedures    Potassium     Standing Status:   Future     Standing Expiration Date:   11/15/2018     Orders Placed This Encounter   Medications    spironolactone (ALDACTONE) 50 MG tablet     Sig: Take 1 tablet by mouth daily     Dispense:  30 tablet     Refill:  3    Misc. Devices MISC     Si each by Does not apply route once for 1 dose     Dispense:  1 each     Refill:  0     Cefaly. D: chronic headaches.        Electronically signed by Lucy Banks MD on 11/15/2017 at 5:08 PM       (Please note that portions of this note were completed with a voice recognition program. Efforts were made to edit the dictations but occasionally words are mis-transcribed.)

## 2017-11-15 NOTE — PATIENT INSTRUCTIONS
Patient Education          spironolactone  Pronunciation:  spir ON oh LAK tone  Brand:  Aldactone  What is the most important information I should know about spironolactone? You should not use spironolactone if you have kidney disease, high levels of potassium in your blood, B[place]oneta's disease (an adrenal gland disorder), if you are unable to urinate, or if you are also taking eplerenone. In animal studies, spironolactone caused certain types of cancers or tumors. It is not known whether these effects would occur in people using this medicine. Ask your doctor about your risk. What is spironolactone? Spironolactone is a potassium-sparing diuretic (water pill) that prevents your body from absorbing too much salt and keeps your potassium levels from getting too low. Spironolactone is used to diagnose or treat a condition in which you have too much aldosterone in your body. Aldosterone is a hormone produced by your adrenal glands to help regulate the salt and water balance in your body. Spironolactone also treats fluid retention (edema) in people with congestive heart failure, cirrhosis of the liver, or a kidney disorder called nephrotic syndrome. This medication is also used to treat or prevent hypokalemia (low potassium levels in the blood). Spironolactone may also be used for purposes not listed in this medication guide. What should I discuss with my healthcare provider before taking spironolactone? You should not use spironolactone if you are allergic to it, or if you have:  · kidney disease, or if you are unable to urinate;  · Rudy's disease (an adrenal gland disorder);  · high levels of potassium in your blood (hyperkalemia); or  · if you are also taking eplerenone.   To make sure spironolactone is safe for you, tell your doctor if you have:  · liver disease;  · heart disease;  · an electrolyte imbalance (such as low levels of magnesium in your blood); or  · if you take an NSAID (nonsteroidal anti-inflammatory drug), cholestyramine, heparin, lithium, heart or blood pressure medication, potassium supplements, steroid medicine, or another diuretic. In animal studies, spironolactone caused certain types of tumors, some of which were cancerous. However, very high doses are used in animal studies. It is not known whether these effects would occur in people using regular doses. Ask your doctor about your risk. It is not known whether spironolactone will harm an unborn baby. Tell your doctor if you are pregnant or plan to become pregnant. Spironolactone can pass into breast milk and may harm a nursing baby. You should not breast-feed while using this medicine. How should I take spironolactone? Follow all directions on your prescription label. Do not take this medicine in larger or smaller amounts or for longer than recommended. While using spironolactone, you may need frequent blood tests. This medication can cause unusual results with certain medical tests. Tell any doctor who treats you that you are using spironolactone. If you need surgery, tell the surgeon ahead of time that you are using spironolactone. You may need to stop using the medicine for a short time. If you are being treated for high blood pressure, keep using this medication even if you feel well. High blood pressure often has no symptoms. You may need to use blood pressure medication for the rest of your life. Store at room temperature away from heat, light, and moisture. What happens if I miss a dose? Take the missed dose as soon as you remember. Skip the missed dose if it is almost time for your next scheduled dose. Do not take extra medicine to make up the missed dose. What happens if I overdose? Seek emergency medical attention or call the Poison Help line at 1-181.874.4526. What should I avoid while taking spironolactone? Drinking alcohol can increase certain side effects of spironolactone.   Do not use salt substitutes or low-sodium milk products that contain potassium. These products could cause your potassium levels to get too high while you are taking spironolactone. Avoid a high-salt diet. Too much salt will cause your body to retain water, which may reduce the effectiveness of this medicine. Spironolactone may impair your thinking or reactions. Be careful if you drive or do anything that requires you to be alert. Avoid becoming overheated or dehydrated during exercise and in hot weather. Follow your doctor's instructions about the type and amount of liquids you should drink. In some cases, drinking too much liquid can be as unsafe as not drinking enough. What are the possible side effects of spironolactone? Get emergency medical help if you have signs of an allergic reaction: hives; difficulty breathing; swelling of your face, lips, tongue, or throat. Stop using spironolactone and call your doctor at once if you have:  · signs of stomach bleeding --bloody or tarry stools, coughing up blood or vomit that looks like coffee grounds;  · high potassium --slow heart rate, weak pulse, muscle weakness or limp feeling, tingly feeling;  · low sodium --confusion, slurred speech, hallucinations, severe weakness, loss of coordination, feeling unsteady, seizure (convulsions), fainting, shallow breathing (breathing may stop); or  · symptoms of any electrolyte imbalance --dry mouth, increased thirst, drowsiness, lack of energy, restless feeling, confusion, nausea, vomiting, increased urination, muscle cramps or weakness, fast heart rate, little or no urinating, or feeling like you might pass out. Common side effects may include:  · mild nausea or vomiting, diarrhea;  · breast swelling or tenderness;  · dizziness, headache, mild drowsiness;  · leg cramps; or  · impotence, difficulty having an erection. This is not a complete list of side effects and others may occur. Call your doctor for medical advice about side effects.  You may report side effects to FDA at 3-986-FDA-2322. What other drugs will affect spironolactone? Taking this medicine with other drugs that make you dizzy or lower your blood pressure can worsen these effects. Ask your doctor before taking spironolactone with a narcotic pain medicine, muscle relaxer, or medicine for anxiety or seizures. Other drugs may interact with spironolactone, including prescription and over-the-counter medicines, vitamins, and herbal products. Tell each of your health care providers about all medicines you use now and any medicine you start or stop using. Where can I get more information? Your pharmacist can provide more information about spironolactone. Remember, keep this and all other medicines out of the reach of children, never share your medicines with others, and use this medication only for the indication prescribed. Every effort has been made to ensure that the information provided by [address] is accurate, up-to-date, and complete, but no guarantee is made to that effect. Drug information contained herein may be time sensitive. [facility] information has been compiled for use by healthcare practitioners and consumers in the [place] and therefore [employer] does not warrant that uses outside of the [place] are appropriate, unless specifically indicated otherwise. [facility]'s drug information does not endorse drugs, diagnose patients or recommend therapy. [facility]'s drug information is an informational resource designed to assist licensed healthcare practitioners in caring for their patients and/or to serve consumers viewing this service as a supplement to, and not a substitute for, the expertise, skill, knowledge and judgment of healthcare practitioners. The absence of a warning for a given drug or drug combination in no way should be construed to indicate that the drug or drug combination is safe, effective or appropriate for any given patient.  [employer] does not assume instructions provided with each medication. Do not change your doses or medication schedule without your doctor's advice. Store at room temperature away from moisture, heat, and light. Keep track of the amount of medicine used from each new bottle. Clonazepam is a drug of abuse and you should be aware if anyone is using your medicine improperly or without a prescription. What happens if I miss a dose? Take the missed dose as soon as you remember. Skip the missed dose if it is almost time for your next scheduled dose. Do not take extra medicine to make up the missed dose. What happens if I overdose? Seek emergency medical attention or call the Poison Help line at 1-190.192.9485. Overdose symptoms may include extreme drowsiness, confusion, muscle weakness, fainting, or coma. What should I avoid while taking clonazepam?  Avoid drinking alcohol. Dangerous side effects could occur. Clonazepam may impair your thinking or reactions. Avoid driving or operating machinery until you know how this medicine will affect you. Dizziness or severe drowsiness can cause falls or other accidents. What are the possible side effects of clonazepam?  Get emergency medical help if you have signs of an allergic reaction: hives; difficulty breathing; swelling of your face, lips, tongue, or throat. Report any new or worsening symptoms to your doctor, such as: mood or behavior changes, anxiety, panic attacks, trouble sleeping, or if you feel impulsive, irritable, agitated, hostile, aggressive, restless, hyperactive (mentally or physically), more depressed, or have thoughts about suicide or hurting yourself.   Call your doctor at once if you have:  · new or worsening seizures;  · severe drowsiness;  · unusual changes in mood or behavior;  · confusion, aggression, hallucinations;  · thoughts of suicide or hurting yourself;  · weak or shallow breathing;  · pounding heartbeats or fluttering in your chest; or  · unusual or involuntary eye service as a supplement to, and not a substitute for, the expertise, skill, knowledge and judgment of healthcare practitioners. The absence of a warning for a given drug or drug combination in no way should be construed to indicate that the drug or drug combination is safe, effective or appropriate for any given patient. [facility] does not assume any responsibility for any aspect of healthcare administered with the aid of information [facility] provides. The information contained herein is not intended to cover all possible uses, directions, precautions, warnings, drug interactions, allergic reactions, or adverse effects. If you have questions about the drugs you are taking, check with your doctor, nurse or pharmacist.  Copyright 6000-1626 [address] [place] [address] Dr RAMOS. Version: 6.04. Revision date: 9/28/2016. Care instructions adapted under license by [facility] If you have questions about a medical condition or this instruction, always ask your healthcare professional. [place] [address] any warranty or liability for your use of this information.

## 2017-11-15 NOTE — ED PROVIDER NOTES
[facility] ED  [address] [facility] [address]  Phone: [phone]        ADDENDUM:      Care of this patient was assumed from Dr. Ashley Clifford. The patient was seen for Hypertension  . The patient's initial evaluation and plan have been discussed with the prior provider who initially evaluated the patient. Nursing Notes, Past Medical Hx, Past Surgical Hx, Social Hx, Allergies, and Family Hx were all reviewed. PAST MEDICAL HISTORY    has a past medical history of Acid reflux; Anemia; Hypertension; Migraine; Mitral valve prolapse; and Osteoarthritis. SURGICAL HISTORY      has a past surgical history that includes Ovary removal; Toe Surgery;  section; and other surgical history. CURRENT MEDICATIONS       Previous Medications    ACETAMINOPHEN (TYLENOL) 500 MG TABLET    Take 500 mg by mouth 2 times daily Indications: 2 PO BID    AMITRIPTYLINE (ELAVIL) 25 MG TABLET    daily     BACLOFEN (LIORESAL) 10 MG TABLET    Take 1 tablet by mouth 3 times daily    BUSPIRONE (BUSPAR) 15 MG TABLET    15 mg 2 times daily     BUTALBITAL-ACETAMINOPHEN-CAFFEINE (FIORICET, ESGIC) -40 MG PER TABLET    Take 1 tablet by mouth every 6 hours as needed for Headaches or Migraine    CALCIUM CITRATE-VITAMIN D (CALCIUM CITRATE + PO)    Take by mouth 2 times daily    CHOLECALCIFEROL (VITAMIN D) 2000 UNITS CAPS CAPSULE    Take by mouth daily    LOSARTAN (COZAAR) 50 MG TABLET    Take 50 mg by mouth daily    PRAVASTATIN (PRAVACHOL) 40 MG TABLET    Take 1 tablet by mouth daily    SUMATRIPTAN (IMITREX) 100 MG TABLET    Take 1 tablet by mouth once as needed for Migraine Indications: 50/100mg prn       ALLERGIES     is allergic to percocet [oxycodone-acetaminophen]. FAMILY HISTORY     indicated that her mother is alive. She indicated that her father is . family history includes Heart Attack in her father; Neuropathy in her mother. SOCIAL HISTORY      reports that she has never smoked.  She has never W/REFLEX CULTURE   Result Value Ref Range    Color, UA YELLOW YEL    Turbidity UA CLEAR CLEAR    Glucose, Ur NEGATIVE NEG    Bilirubin Urine NEGATIVE NEG    Ketones, Urine NEGATIVE NEG    Specific Gravity, UA 1.010 1.005 - 1.030    Urine Hgb NEGATIVE NEG    pH, UA 6.5 5.0 - 8.0    Protein, UA NEGATIVE NEG    Urobilinogen, Urine Normal NORM    Nitrite, Urine NEGATIVE NEG    Leukocyte Esterase, Urine NEGATIVE NEG    Urinalysis Comments       Microscopic exam not performed based on chemical results unless requested in       RECENT VITALS:  BP: (!) 142/85, Temp: 98.3 °F (36.8 °C), Pulse: 90, Resp: 11     ED Course     The patient was given the following medications:  Orders Placed This Encounter   Medications    cloNIDine (CATAPRES) tablet 0.1 mg         Medical Decision Making      On repeat evaluation, the patient's blood pressures greatly improved. She is neurovascularly intact with this I do feel able to be followed up as an outpatient I'm recommending that she contact her family doctor tomorrow to talk about her blood pressure medications. She is to return to the ER for increasing pain, any chest pain, shortness of breath, numbness weakness visual hearing changes, fever, vomiting or other concerns otherwise to call her family doctor tomorrow for a follow-up appointment    Disposition     FINAL IMPRESSION      1. Hypertension, unspecified type          DISPOSITION/PLAN   DISPOSITION Decision to Discharge    CONDITION ON DISPOSITION:   Improved - Stable    PATIENT REFERRED TO:  Emily Carvalho MD  [address] [place] [address], [place] [address]    Call in 1 day        DISCHARGE MEDICATIONS:  New Prescriptions    No medications on file             (Please note that portions of this note were completed with a voice recognition program.  Efforts were made to edit the dictations but occasionally words are mis-transcribed.)    Kumari MD, F.A.C.E.P.   Attending Emergency Medicine Physician               Elisa Dexter MD  11/14/17 1949

## 2017-11-22 ENCOUNTER — TELEPHONE (OUTPATIENT)
Dept: OBGYN CLINIC | Age: 57
End: 2017-11-22

## 2017-11-24 ENCOUNTER — NURSE ONLY (OUTPATIENT)
Dept: FAMILY MEDICINE CLINIC | Age: 57
End: 2017-11-24
Payer: COMMERCIAL

## 2017-11-24 VITALS — HEART RATE: 105 BPM | DIASTOLIC BLOOD PRESSURE: 83 MMHG | SYSTOLIC BLOOD PRESSURE: 143 MMHG

## 2017-11-24 DIAGNOSIS — I10 ESSENTIAL HYPERTENSION: Primary | ICD-10-CM

## 2017-11-24 PROCEDURE — 99212 OFFICE O/P EST SF 10 MIN: CPT | Performed by: FAMILY MEDICINE

## 2017-11-24 RX ORDER — CLONAZEPAM 0.25 MG/1
0.25 TABLET, ORALLY DISINTEGRATING ORAL 2 TIMES DAILY PRN
Qty: 30 TABLET | Refills: 0 | Status: SHIPPED | OUTPATIENT
Start: 2017-11-24 | End: 2017-12-11 | Stop reason: SDUPTHER

## 2017-12-11 ENCOUNTER — OFFICE VISIT (OUTPATIENT)
Dept: FAMILY MEDICINE CLINIC | Age: 57
End: 2017-12-11
Payer: COMMERCIAL

## 2017-12-11 VITALS
RESPIRATION RATE: 16 BRPM | TEMPERATURE: 98.5 F | SYSTOLIC BLOOD PRESSURE: 137 MMHG | WEIGHT: 128.2 LBS | HEART RATE: 83 BPM | DIASTOLIC BLOOD PRESSURE: 89 MMHG | BODY MASS INDEX: 21.36 KG/M2 | OXYGEN SATURATION: 99 % | HEIGHT: 65 IN

## 2017-12-11 DIAGNOSIS — I10 ESSENTIAL HYPERTENSION: Primary | ICD-10-CM

## 2017-12-11 DIAGNOSIS — G43.719 INTRACTABLE CHRONIC MIGRAINE WITHOUT AURA AND WITHOUT STATUS MIGRAINOSUS: ICD-10-CM

## 2017-12-11 DIAGNOSIS — G44.221 CHRONIC TENSION-TYPE HEADACHE, INTRACTABLE: ICD-10-CM

## 2017-12-11 PROCEDURE — 99213 OFFICE O/P EST LOW 20 MIN: CPT | Performed by: FAMILY MEDICINE

## 2017-12-11 RX ORDER — BACLOFEN 10 MG/1
10 TABLET ORAL 3 TIMES DAILY
Qty: 60 TABLET | Refills: 1 | Status: SHIPPED | OUTPATIENT
Start: 2017-12-11 | End: 2018-02-13 | Stop reason: SDUPTHER

## 2017-12-11 RX ORDER — PRAVASTATIN SODIUM 40 MG
40 TABLET ORAL DAILY
Qty: 30 TABLET | Refills: 0 | Status: SHIPPED | OUTPATIENT
Start: 2017-12-11 | End: 2018-01-10 | Stop reason: SDUPTHER

## 2017-12-11 RX ORDER — CLONAZEPAM 0.25 MG/1
0.25 TABLET, ORALLY DISINTEGRATING ORAL 2 TIMES DAILY PRN
Qty: 60 TABLET | Refills: 0 | Status: SHIPPED | OUTPATIENT
Start: 2017-12-11 | End: 2018-01-08 | Stop reason: SDUPTHER

## 2017-12-11 RX ORDER — SUMATRIPTAN 100 MG/1
100 TABLET, FILM COATED ORAL
Qty: 9 TABLET | Refills: 1 | Status: SHIPPED | OUTPATIENT
Start: 2017-12-11 | End: 2018-01-12 | Stop reason: SDUPTHER

## 2017-12-11 RX ORDER — SUMATRIPTAN 100 MG/1
100 TABLET, FILM COATED ORAL
Qty: 9 TABLET | Refills: 1 | Status: SHIPPED | OUTPATIENT
Start: 2017-12-11 | End: 2017-12-11

## 2017-12-11 NOTE — PROGRESS NOTES
HYPERTENSION visit     BP Readings from Last 3 Encounters:   12/11/17 137/89   11/24/17 (!) 143/83   11/15/17 (!) 171/95       LDL Cholesterol (mg/dL)   Date Value   08/26/2017 135 (H)     HDL (mg/dL)   Date Value   08/26/2017 53     BUN (mg/dL)   Date Value   11/14/2017 15     CREATININE (mg/dL)   Date Value   11/14/2017 0.60     Glucose (mg/dL)   Date Value   11/14/2017 113 (H)              Have you changed or started any medications since your last visit including any over-the-counter medicines, vitamins, or herbal medicines? no   Have you stopped taking any of your medications? Is so, why? -  no  Are you having any side effects from any of your medications? - no    Have you seen any other physician or provider since your last visit?  no   Have you had any other diagnostic tests since your last visit?  no   Have you been seen in the emergency room and/or had an admission in a hospital since we last saw you?  no   Have you had your routine dental cleaning in the past 6 months?  no     Do you have an active MyChart account? If no, what is the barrier?   No: declined    Patient Care Team:  Cornelia Vasquez MD as PCP - General (Family Medicine)    Medical History Review  Past Medical, Family, and Social History reviewed and does not contribute to the patient presenting condition    Health Maintenance   Topic Date Due    Hepatitis C screen  1960    HIV screen  05/25/1975    DTaP/Tdap/Td vaccine (1 - Tdap) 05/25/1979    Cervical cancer screen  05/25/1981    Breast cancer screen  05/25/2010    Colon cancer screen colonoscopy  05/25/2010    Flu vaccine (1) 09/01/2017    Lipid screen  08/26/2022

## 2018-01-08 ENCOUNTER — OFFICE VISIT (OUTPATIENT)
Dept: FAMILY MEDICINE CLINIC | Age: 58
End: 2018-01-08
Payer: COMMERCIAL

## 2018-01-08 VITALS
TEMPERATURE: 97.6 F | HEIGHT: 65 IN | SYSTOLIC BLOOD PRESSURE: 128 MMHG | BODY MASS INDEX: 21.33 KG/M2 | RESPIRATION RATE: 12 BRPM | WEIGHT: 128 LBS | OXYGEN SATURATION: 99 % | DIASTOLIC BLOOD PRESSURE: 79 MMHG | HEART RATE: 75 BPM

## 2018-01-08 DIAGNOSIS — I10 ESSENTIAL HYPERTENSION: ICD-10-CM

## 2018-01-08 PROCEDURE — 99213 OFFICE O/P EST LOW 20 MIN: CPT | Performed by: FAMILY MEDICINE

## 2018-01-08 RX ORDER — SUMATRIPTAN 100 MG/1
TABLET, FILM COATED ORAL
COMMUNITY
Start: 2018-01-07 | End: 2018-01-12

## 2018-01-08 RX ORDER — CLONAZEPAM 0.25 MG/1
0.25 TABLET, ORALLY DISINTEGRATING ORAL 3 TIMES DAILY PRN
Qty: 90 TABLET | Refills: 0 | Status: SHIPPED | OUTPATIENT
Start: 2018-01-08 | End: 2018-02-12 | Stop reason: SDUPTHER

## 2018-01-11 RX ORDER — PRAVASTATIN SODIUM 40 MG
40 TABLET ORAL DAILY
Qty: 30 TABLET | Refills: 3 | Status: SHIPPED | OUTPATIENT
Start: 2018-01-11 | End: 2018-01-12 | Stop reason: SDUPTHER

## 2018-01-12 DIAGNOSIS — I10 ESSENTIAL HYPERTENSION: ICD-10-CM

## 2018-01-12 RX ORDER — SPIRONOLACTONE 50 MG/1
50 TABLET, FILM COATED ORAL DAILY
Qty: 90 TABLET | Refills: 1 | Status: SHIPPED | OUTPATIENT
Start: 2018-01-12 | End: 2018-05-22 | Stop reason: SDUPTHER

## 2018-01-12 RX ORDER — PRAVASTATIN SODIUM 40 MG
40 TABLET ORAL DAILY
Qty: 90 TABLET | Refills: 1 | Status: SHIPPED | OUTPATIENT
Start: 2018-01-12 | End: 2018-07-13 | Stop reason: SDUPTHER

## 2018-01-12 RX ORDER — SUMATRIPTAN 100 MG/1
100 TABLET, FILM COATED ORAL
Qty: 9 TABLET | Refills: 1 | Status: SHIPPED | OUTPATIENT
Start: 2018-01-12 | End: 2018-03-25 | Stop reason: SDUPTHER

## 2018-01-15 ENCOUNTER — HOSPITAL ENCOUNTER (OUTPATIENT)
Facility: CLINIC | Age: 58
Discharge: HOME | End: 2018-01-15
Payer: COMMERCIAL

## 2018-01-15 DIAGNOSIS — I10 ESSENTIAL HYPERTENSION: ICD-10-CM

## 2018-01-15 LAB — POTASSIUM SERPL-SCNC: 4.4 MMOL/L (ref 3.7–5.3)

## 2018-01-15 PROCEDURE — 84132 ASSAY OF SERUM POTASSIUM: CPT

## 2018-01-15 PROCEDURE — 36415 COLL VENOUS BLD VENIPUNCTURE: CPT

## 2018-01-22 ENCOUNTER — TELEPHONE (OUTPATIENT)
Dept: FAMILY MEDICINE CLINIC | Age: 58
End: 2018-01-22

## 2018-01-22 DIAGNOSIS — M79.18 BUTTOCK PAIN: ICD-10-CM

## 2018-01-22 DIAGNOSIS — G43.719 INTRACTABLE CHRONIC MIGRAINE WITHOUT AURA AND WITHOUT STATUS MIGRAINOSUS: Primary | ICD-10-CM

## 2018-01-22 NOTE — TELEPHONE ENCOUNTER
Pt would like a referral to go see Dr. Norah Mark, sports medicine, since she continues to have pain in back of leg and buttock area. Driving is painful and she cannot run like she normally does. Says she has brought this up before and wonders if she needs appt in order to get the referral or if we can just write. Dr. Last Hunter. [address]    [address] [facility], [address] [place] [address]    P: [phone]  F: [phone]

## 2018-01-30 ENCOUNTER — TELEPHONE (OUTPATIENT)
Dept: FAMILY MEDICINE CLINIC | Age: 58
End: 2018-01-30

## 2018-01-30 RX ORDER — PROMETHAZINE HYDROCHLORIDE 25 MG/1
25 TABLET ORAL EVERY 8 HOURS PRN
Qty: 30 TABLET | Refills: 0 | Status: SHIPPED | OUTPATIENT
Start: 2018-01-30 | End: 2021-02-01

## 2018-01-30 NOTE — TELEPHONE ENCOUNTER
I did call the patient. Ulises was called in. She will follow-up in the the office this week. Thank you.

## 2018-01-30 NOTE — TELEPHONE ENCOUNTER
Patient states she took an imitrex this morning, she is really nauseated. She states shes had toradol before but without much relief. She states she may have to come in for an appointment for different medication.

## 2018-01-30 NOTE — TELEPHONE ENCOUNTER
patient called in and would like to know if Dr. Saundra Vaughn can call her or advise on her migraine medication. She had a really bad one 1/29/2018.

## 2018-02-02 ENCOUNTER — OFFICE VISIT (OUTPATIENT)
Dept: FAMILY MEDICINE CLINIC | Age: 58
End: 2018-02-02
Payer: COMMERCIAL

## 2018-02-02 VITALS
SYSTOLIC BLOOD PRESSURE: 125 MMHG | OXYGEN SATURATION: 100 % | HEART RATE: 73 BPM | HEIGHT: 65 IN | DIASTOLIC BLOOD PRESSURE: 85 MMHG | RESPIRATION RATE: 16 BRPM | BODY MASS INDEX: 20.89 KG/M2 | WEIGHT: 125.4 LBS

## 2018-02-02 DIAGNOSIS — G89.29 CHRONIC INTRACTABLE HEADACHE, UNSPECIFIED HEADACHE TYPE: Primary | ICD-10-CM

## 2018-02-02 DIAGNOSIS — R51.9 CHRONIC INTRACTABLE HEADACHE, UNSPECIFIED HEADACHE TYPE: Primary | ICD-10-CM

## 2018-02-02 PROCEDURE — 99213 OFFICE O/P EST LOW 20 MIN: CPT | Performed by: FAMILY MEDICINE

## 2018-02-02 RX ORDER — CALCIUM CARBONATE 500(1250)
500 TABLET ORAL DAILY
Qty: 30 TABLET | Refills: 3 | Status: SHIPPED | OUTPATIENT
Start: 2018-02-02 | End: 2018-06-14 | Stop reason: SDUPTHER

## 2018-02-02 RX ORDER — AMITRIPTYLINE HYDROCHLORIDE 50 MG/1
50 TABLET, FILM COATED ORAL NIGHTLY
Qty: 30 TABLET | Refills: 3 | Status: SHIPPED | OUTPATIENT
Start: 2018-02-02 | End: 2018-07-20 | Stop reason: SDUPTHER

## 2018-02-02 RX ORDER — TRAMADOL HYDROCHLORIDE 50 MG/1
50 TABLET ORAL DAILY
Qty: 20 TABLET | Refills: 0 | Status: SHIPPED | OUTPATIENT
Start: 2018-02-02 | End: 2018-02-22

## 2018-02-02 NOTE — PROGRESS NOTES
daily       acetaminophen (TYLENOL) 500 MG tablet Take 500 mg by mouth 2 times daily Indications: 2 PO BID      butalbital-acetaminophen-caffeine (FIORICET, ESGIC) -40 MG per tablet Take 1 tablet by mouth every 6 hours as needed for Headaches or Migraine 60 tablet 3     No current facility-administered medications for this visit. ALLERGIES:    Allergies   Allergen Reactions    Percocet [Oxycodone-Acetaminophen] Hives       Social History   Substance Use Topics    Smoking status: Never Smoker    Smokeless tobacco: Never Used    Alcohol use No      Body mass index is 20.87 kg/m². /85   Pulse 73   Resp 16   Ht 5' 5\" (1.651 m)   Wt 125 lb 6.4 oz (56.9 kg)   SpO2 100%   BMI 20.87 kg/m²     Subjective:      HPI  20-year-old female coming today for follow-up. Patient states that she has been having headache over last week over days, not as bad, but she states that she tried Imitrex which helped some. She also has been taking the amitriptyline 25 mg at night. The patient states that she has been taking care of twins of her daughters daily. Overall she is feeling better. Besides the headaches. Patient is also concerned about her son was 27years old PhD in chemical signs who cannot find a job. Patient is leaving for [place] to go to [place] with her family in 2 days for 1 week. Patient also wants to discuss supplements. Patient also wants to refill her tramadol, which helped her recently with her headaches. Review of Systems   Constitutional: Negative for fever and unexpected weight change. Respiratory: Negative for cough and shortness of breath. Cardiovascular: Negative for chest pain and leg swelling. Gastrointestinal: Negative for diarrhea, constipation and blood in stool. Endocrine: Negative for polydipsia and polyuria. Neurological: Negative for dizziness and positive for headaches. Psychiatric/Behavioral: Negative for confusion and agitation.   Positive for voice recognition program. Efforts were made to edit the dictations but occasionally words are mis-transcribed.)

## 2018-02-12 DIAGNOSIS — I10 ESSENTIAL HYPERTENSION: ICD-10-CM

## 2018-02-12 RX ORDER — CLONAZEPAM 0.25 MG/1
0.25 TABLET, ORALLY DISINTEGRATING ORAL 3 TIMES DAILY PRN
Qty: 90 TABLET | Refills: 0 | Status: SHIPPED | OUTPATIENT
Start: 2018-02-12 | End: 2018-03-16 | Stop reason: SDUPTHER

## 2018-02-13 DIAGNOSIS — G44.221 CHRONIC TENSION-TYPE HEADACHE, INTRACTABLE: ICD-10-CM

## 2018-02-13 RX ORDER — BACLOFEN 10 MG/1
10 TABLET ORAL 3 TIMES DAILY
Qty: 60 TABLET | Refills: 1 | Status: SHIPPED | OUTPATIENT
Start: 2018-02-13 | End: 2018-04-16 | Stop reason: SDUPTHER

## 2018-03-14 NOTE — TELEPHONE ENCOUNTER
[address] [facility] 
 
 
 [address] [facility] [address] 
[phone] Patient: Alejandra Hopkins MRN: [id number] HGN:2/[birth year]14 Visit Information Date & Time Provider Department Dept. Phone Encounter #  
 3/14/2018 10:50 AM Fnany Montanez NP [facility] [phone] [id number] Follow-up Instructions Return in about 4 weeks (around 4/11/2018). Follow-up and Disposition History Your Appointments 3/27/2018  3:00 PM  
Follow Up with Jamila Pettit MD  
[facility] [address] [facility]) Appt Note: 6 wk fu sx 2-28; 6 wk fu sx 2-28  
 [address] [facility] [address]  
[address] [facility] [address] [id number]  
  
    
 6/4/2018  1:00 PM  
Follow Up with Chico Pierre NP [address] [facility] ([address]) Appt Note: 6 mth follow up  
 [address] [facility] [address]  
[address] [facility] [address] Upcoming Health Maintenance Date Due Pneumococcal 19-64 Medium Risk (1 of 1 - PPSV23) 9/8/1972 DTaP/Tdap/Td series (1 - Tdap) 9/8/1974 FOBT Q 1 YEAR AGE 50-75 9/8/2003 ZOSTER VACCINE AGE 60> 7/8/2013 PAP AKA CERVICAL CYTOLOGY 9/2/2017 BREAST CANCER SCRN MAMMOGRAM 10/4/2019 Allergies as of 3/14/2018  Review Complete On: 3/14/2018 By: Fanny Montanez NP Severity Noted Reaction Type Reactions Lyrica [Pregabalin]  10/04/2017    Other (comments) Medication causes dry eyes, unstable balance, and lack of concentration. Mold Extracts  11/11/2014   Systemic Runny Nose Pollen Extracts  02/22/2018    Itching Statins-hmg-coa Reductase Inhibitors  06/05/2017   Intolerance Myalgia Topamax [Topiramate]  01/08/2018    Vertigo Current Immunizations  Reviewed on 10/29/2015 Name Date How is her migraine?  does she need be seen today. Even just for a toradol injection. What did she try for migraine headaches. She tried Imitrex or any other migraine meds? .  Thank you. Influenza Vaccine 10/1/2017, 10/6/2016 Influenza Vaccine Deborha Inoue) 10/29/2015 Influenza Vaccine (Quad) PF 10/31/2013 Influenza Vaccine (Trivalent) 11/11/2014  3:00 PM  
  
 Not reviewed this visit You Were Diagnosed With   
  
 Codes Comments Cervical spondylosis with myelopathy    -  Primary ICD-10-CM: M47.12 
ICD-9-CM: 721.1 S/P cervical spinal fusion     ICD-10-CM: Z98.1 ICD-9-CM: V45.4 Use of opiates for therapeutic purposes     ICD-10-CM: Z79.891 ICD-9-CM: V58.69 Vitals BP Pulse Temp Resp Height(growth percentile) Weight(growth percentile) 156/84 (!) 102 97.3 °F (36.3 °C) (Oral) 16 5' 1\" (1.549 m) 122 lb 3.2 oz (55.4 kg) SpO2 BMI OB Status Smoking Status 99% 23.09 kg/m2 Postmenopausal Current Every Day Smoker BMI and BSA Data Body Mass Index Body Surface Area 23.09 kg/m 2 1.54 m 2 Preferred Pharmacy Pharmacy Name Phone [facility] [address] [facility] [phone] Your Updated Medication List  
  
   
This list is accurate as of 3/14/18 11:35 AM.  Always use your most recent med list.  
  
  
  
  
 albuterol 90 mcg/actuation inhaler Commonly known as:  PROVENTIL HFA Take 2 Puffs by inhalation every six (6) hours as needed. Cholecalciferol (Vitamin D3) 2,000 unit Cap capsule Commonly known as:  VITAMIN D3 Take 2,000 Units by mouth daily. dexlansoprazole 30 mg capsule Commonly known as:  DEXILANT Take 1 Cap by mouth daily. Indications: gastroesophageal reflux disease  
  
 ezetimibe 10 mg tablet Commonly known as:  Teresa El Take 1 Tab by mouth daily. fluticasone-salmeterol 250-50 mcg/dose diskus inhaler Commonly known as:  ADVAIR Take 1 Puff by inhalation every twelve (12) hours. lisinopril-hydroCHLOROthiazide 20-12.5 mg per tablet Commonly known as:  Atul Berny Take 1 Tab by mouth daily. Indications: hypertension magnesium oxide 400 mg tablet Commonly known as:  MAG-OX Take 1 Tab by mouth daily. mometasone 50 mcg/actuation nasal spray Commonly known as:  NASONEX  
2 Sprays by Both Nostrils route daily. montelukast 10 mg tablet Commonly known as:  SINGULAIR Take 1 Tab by mouth daily. Indications: Allergic Rhinitis  
  
 promethazine 25 mg tablet Commonly known as:  PHENERGAN Take 1 Tab by mouth every eight (8) hours as needed for Nausea. Indications: POST-OPERATIVE NAUSEA AND VOMITING  
  
 topiramate 25 mg tablet Commonly known as:  TOPAMAX Take 1 tab po QHS for one week, then increase to 2 tabs po QHS  
  
 traMADol 50 mg tablet Commonly known as:  ULTRAM  
Take 1 Tab by mouth every six (6) hours as needed for Pain (s/p cervical fusion). Max Daily Amount: 200 mg. Prescriptions Printed Refills  
 traMADol (ULTRAM) 50 mg tablet 0 Sig: Take 1 Tab by mouth every six (6) hours as needed for Pain (s/p cervical fusion). Max Daily Amount: 200 mg. Class: Print Route: Oral  
  
Follow-up Instructions Return in about 4 weeks (around 4/11/2018). To-Do List   
 03/14/2018 Lab:  DRUG SCREEN UR - W/ CONFIRM Patient Instructions Cervical Spinal Fusion: What to Expect at [facility] Your Recovery After surgery, you can expect your neck to feel stiff and sore. This should improve in the weeks after surgery. You may have trouble sitting or standing in one position for very long and may need pain medicine in the weeks after your surgery. You may need to wear a neck brace for a while. It may take 4 to 6 weeks to get back to your usual activities, but it may depend on what kind of surgery you had. Your doctor may advise you to work with a physical therapist to strengthen the muscles around your neck and back. This care sheet gives you a general idea about how long it will take for you to recover. But each person recovers at a different pace.  Follow the steps below to get better as quickly as possible. How can you care for yourself at home? Activity ? · Rest when you feel tired. Getting enough sleep will help you recover. ? · Try to walk each day. Start by walking a little more than you did the day before. Bit by bit, increase the amount you walk. Walking boosts blood flow and helps prevent pneumonia and constipation. Walking may also decrease your muscle soreness after surgery. ? · Follow your doctor's directions about not lifting anything that would strain your neck and back. This may include a child, heavy grocery bags and milk containers, a heavy briefcase or backpack, cat litter or dog food bags, or a vacuum . ? · Avoid strenuous activities, such as bicycle riding, jogging, weightlifting, or aerobic exercise, until your doctor says it is okay. ? · Do not drive for 2 to 4 weeks after your surgery or until your doctor says it is okay. ? · Avoid taking long car trips for 2 to 4 weeks after surgery. Your neck may become tired and painful from sitting too long in one position. ? · You will probably need to take 4 to 6 weeks off from work. It depends on the type of work you do and how you feel. ? · You may have sex as soon as you feel able, but avoid positions that put stress on your neck or cause pain. Diet ? · You can eat your normal diet. If your stomach is upset, try bland, low-fat foods like plain rice, broiled chicken, toast, and yogurt. ? · Drink plenty of fluids. If you have kidney, heart, or liver disease and have to limit fluids, talk with your doctor before you increase the amount of fluids you drink. ? · You may notice that your bowel movements are not regular right after your surgery. This is common. Try to avoid constipation and straining with bowel movements. You may want to take a fiber supplement every day. If you have not had a bowel movement after a couple of days, ask your doctor about taking a mild laxative. Medicines ? · Your doctor will tell you if and when you can restart your medicines. He or she will also give you instructions about taking any new medicines. ? · If you take blood thinners, such as warfarin (Coumadin), clopidogrel (Plavix), or aspirin, be sure to talk to your doctor. He or she will tell you if and when to start taking those medicines again. Make sure that you understand exactly what your doctor wants you to do. ? · Be safe with medicines. Take pain medicines exactly as directed. ¨ If the doctor gave you a prescription medicine for pain, take it as prescribed. ¨ If you are not taking a prescription pain medicine, ask your doctor if you can take an over-the-counter medicine. ? · If your doctor prescribed antibiotics, take them as directed. Do not stop taking them just because you feel better. You need to take the full course of antibiotics. ? · If you think your pain pill is making you sick to your stomach: 
¨ Take your pills after meals (unless your doctor has told you not to). ¨ Ask your doctor for a different pain pill. Incision care ? · You will be given specific instructions about how to care for the cut (incision) the doctor made. The instructions will depend on the type of materials used to close the cut. Exercise ? · Do exercises as instructed by your doctor or physical therapist to improve your strength and flexibility. Other instructions ? · To reduce stiffness and help sore muscles, use a warm water bottle, a heating pad set on low, or a warm cloth on your neck. Do not put heat right over the incision. Do not go to sleep with a heating pad on your skin. Follow-up care is a key part of your treatment and safety. Be sure to make and go to all appointments, and call your doctor if you are having problems. It's also a good idea to know your test results and keep a list of the medicines you take. When should you call for help? Call 911 anytime you think you may need emergency care. For example, call if: 
? · You passed out (lost consciousness). ? · You have chest pain, are short of breath, or cough up blood. ? · You are unable to move an arm or a leg at all. ?Call your doctor now or seek immediate medical care if: 
? · You have pain that does not get better after you take pain medicine. ? · You have loose stitches, or your incision comes open. ? · Bright red blood has soaked through the bandage over your incision. ? · You have signs of a blood clot in your leg (called a deep vein thrombosis), such as: 
¨ Pain in your calf, back of the knee, thigh, or groin. ¨ Redness or swelling in your leg. ? · You have signs of infection, such as: 
¨ Increased pain, swelling, warmth, or redness. ¨ Red streaks leading from the incision. ¨ Pus draining from the incision. ¨ A fever. ? · You have new or worse symptoms in your arms, legs, chest, belly, or buttocks. Symptoms may include: ¨ Numbness or tingling. ¨ Weakness. ¨ Pain. ? · You lose bladder or bowel control. ? Watch closely for any changes in your health, and be sure to contact your doctor if: 
? · You do not get better as expected. Where can you learn more? Go to http://giselle-erika.info/. Enter L117 in the search box to learn more about \"Cervical Spinal Fusion: What to Expect at Home. \" Current as of: March 21, 2017 Content Version: 11.4 © 4154-4291 [employer]. Care instructions adapted under license by [employer] (which disclaims liability or warranty for this information). If you have questions about a medical condition or this instruction, always ask your healthcare professional. [place] [address] any warranty or liability for your use of this information. Please provide this summary of care documentation to your next provider. Your primary care clinician is listed as Alma Mark. If you have any questions after today's visit, please call [phone].

## 2018-03-16 DIAGNOSIS — I10 ESSENTIAL HYPERTENSION: ICD-10-CM

## 2018-03-16 RX ORDER — CLONAZEPAM 0.25 MG/1
0.25 TABLET, ORALLY DISINTEGRATING ORAL 3 TIMES DAILY PRN
Qty: 90 TABLET | Refills: 0 | Status: SHIPPED | OUTPATIENT
Start: 2018-03-16 | End: 2018-04-16 | Stop reason: SDUPTHER

## 2018-03-26 RX ORDER — SUMATRIPTAN 100 MG/1
TABLET, FILM COATED ORAL
Qty: 9 TABLET | Refills: 1 | Status: SHIPPED | OUTPATIENT
Start: 2018-03-26 | End: 2018-05-25 | Stop reason: SDUPTHER

## 2018-04-11 DIAGNOSIS — I10 ESSENTIAL HYPERTENSION: ICD-10-CM

## 2018-04-11 RX ORDER — CLONAZEPAM 0.25 MG/1
0.25 TABLET, ORALLY DISINTEGRATING ORAL 3 TIMES DAILY PRN
Qty: 90 TABLET | Refills: 0 | OUTPATIENT
Start: 2018-04-11 | End: 2018-05-11

## 2018-04-16 ENCOUNTER — OFFICE VISIT (OUTPATIENT)
Dept: FAMILY MEDICINE CLINIC | Age: 58
End: 2018-04-16
Payer: COMMERCIAL

## 2018-04-16 VITALS
RESPIRATION RATE: 16 BRPM | DIASTOLIC BLOOD PRESSURE: 89 MMHG | HEART RATE: 87 BPM | BODY MASS INDEX: 20.93 KG/M2 | WEIGHT: 125.8 LBS | SYSTOLIC BLOOD PRESSURE: 139 MMHG | OXYGEN SATURATION: 100 %

## 2018-04-16 DIAGNOSIS — I10 ESSENTIAL HYPERTENSION: Primary | ICD-10-CM

## 2018-04-16 DIAGNOSIS — G44.221 CHRONIC TENSION-TYPE HEADACHE, INTRACTABLE: ICD-10-CM

## 2018-04-16 PROCEDURE — 99214 OFFICE O/P EST MOD 30 MIN: CPT | Performed by: FAMILY MEDICINE

## 2018-04-16 RX ORDER — CLONAZEPAM 0.25 MG/1
0.25 TABLET, ORALLY DISINTEGRATING ORAL 2 TIMES DAILY PRN
Qty: 60 TABLET | Refills: 0 | Status: SHIPPED | OUTPATIENT
Start: 2018-04-16 | End: 2018-05-11 | Stop reason: SDUPTHER

## 2018-04-16 RX ORDER — BACLOFEN 10 MG/1
10 TABLET ORAL 3 TIMES DAILY
Qty: 60 TABLET | Refills: 1 | Status: SHIPPED | OUTPATIENT
Start: 2018-04-16 | End: 2018-06-14 | Stop reason: SDUPTHER

## 2018-04-16 RX ORDER — LOSARTAN POTASSIUM 50 MG/1
50 TABLET ORAL DAILY
Qty: 90 TABLET | Refills: 1 | Status: SHIPPED | OUTPATIENT
Start: 2018-04-16 | End: 2018-05-22 | Stop reason: SDUPTHER

## 2018-04-16 RX ORDER — TRAMADOL HYDROCHLORIDE 50 MG/1
50 TABLET ORAL 2 TIMES DAILY
Qty: 20 TABLET | Refills: 0 | Status: SHIPPED | OUTPATIENT
Start: 2018-04-16 | End: 2018-04-26

## 2018-05-11 ENCOUNTER — PATIENT MESSAGE (OUTPATIENT)
Dept: FAMILY MEDICINE CLINIC | Age: 58
End: 2018-05-11

## 2018-05-11 DIAGNOSIS — I10 ESSENTIAL HYPERTENSION: ICD-10-CM

## 2018-05-11 RX ORDER — CLONAZEPAM 0.25 MG/1
0.25 TABLET, ORALLY DISINTEGRATING ORAL 2 TIMES DAILY PRN
Qty: 60 TABLET | Refills: 0 | Status: SHIPPED | OUTPATIENT
Start: 2018-05-11 | End: 2018-06-14 | Stop reason: SDUPTHER

## 2018-05-22 RX ORDER — LOSARTAN POTASSIUM 50 MG/1
50 TABLET ORAL DAILY
Qty: 90 TABLET | Refills: 1 | Status: SHIPPED | OUTPATIENT
Start: 2018-05-22 | End: 2018-09-28 | Stop reason: SDUPTHER

## 2018-05-25 RX ORDER — SUMATRIPTAN 100 MG/1
TABLET, FILM COATED ORAL
Qty: 9 TABLET | Refills: 1 | Status: SHIPPED | OUTPATIENT
Start: 2018-05-25 | End: 2018-07-26 | Stop reason: SDUPTHER

## 2018-06-14 DIAGNOSIS — I10 ESSENTIAL HYPERTENSION: ICD-10-CM

## 2018-06-14 DIAGNOSIS — G44.221 CHRONIC TENSION-TYPE HEADACHE, INTRACTABLE: ICD-10-CM

## 2018-06-14 DIAGNOSIS — G89.29 CHRONIC INTRACTABLE HEADACHE, UNSPECIFIED HEADACHE TYPE: ICD-10-CM

## 2018-06-14 DIAGNOSIS — R51.9 CHRONIC INTRACTABLE HEADACHE, UNSPECIFIED HEADACHE TYPE: ICD-10-CM

## 2018-06-14 RX ORDER — CLONAZEPAM 0.25 MG/1
0.25 TABLET, ORALLY DISINTEGRATING ORAL 2 TIMES DAILY PRN
Qty: 60 TABLET | Refills: 0 | Status: SHIPPED | OUTPATIENT
Start: 2018-06-14 | End: 2018-07-12 | Stop reason: SDUPTHER

## 2018-06-14 RX ORDER — CALCIUM CARBONATE 500(1250)
500 TABLET ORAL DAILY
Qty: 30 TABLET | Refills: 3 | Status: SHIPPED | OUTPATIENT
Start: 2018-06-14 | End: 2018-10-29 | Stop reason: SDUPTHER

## 2018-06-14 RX ORDER — BACLOFEN 10 MG/1
10 TABLET ORAL 3 TIMES DAILY
Qty: 60 TABLET | Refills: 1 | Status: SHIPPED | OUTPATIENT
Start: 2018-06-14 | End: 2018-08-16 | Stop reason: SDUPTHER

## 2018-06-25 ENCOUNTER — OFFICE VISIT (OUTPATIENT)
Dept: FAMILY MEDICINE CLINIC | Age: 58
End: 2018-06-25
Payer: COMMERCIAL

## 2018-06-25 VITALS
SYSTOLIC BLOOD PRESSURE: 134 MMHG | BODY MASS INDEX: 20.8 KG/M2 | HEART RATE: 83 BPM | DIASTOLIC BLOOD PRESSURE: 89 MMHG | OXYGEN SATURATION: 100 % | WEIGHT: 125 LBS | RESPIRATION RATE: 16 BRPM

## 2018-06-25 DIAGNOSIS — I10 ESSENTIAL HYPERTENSION: ICD-10-CM

## 2018-06-25 DIAGNOSIS — G43.719 INTRACTABLE CHRONIC MIGRAINE WITHOUT AURA AND WITHOUT STATUS MIGRAINOSUS: Primary | ICD-10-CM

## 2018-06-25 PROCEDURE — 99213 OFFICE O/P EST LOW 20 MIN: CPT | Performed by: FAMILY MEDICINE

## 2018-06-25 RX ORDER — RIZATRIPTAN BENZOATE 10 MG/1
10 TABLET ORAL
Qty: 30 TABLET | Refills: 3 | Status: SHIPPED | OUTPATIENT
Start: 2018-06-25 | End: 2021-02-01

## 2018-06-25 RX ORDER — TRAMADOL HYDROCHLORIDE 50 MG/1
50 TABLET ORAL EVERY 8 HOURS PRN
Qty: 14 TABLET | Refills: 0 | Status: SHIPPED | OUTPATIENT
Start: 2018-06-25 | End: 2018-07-25

## 2018-07-12 DIAGNOSIS — I10 ESSENTIAL HYPERTENSION: ICD-10-CM

## 2018-07-12 RX ORDER — CLONAZEPAM 0.25 MG/1
0.25 TABLET, ORALLY DISINTEGRATING ORAL 2 TIMES DAILY PRN
Qty: 60 TABLET | Refills: 0 | Status: SHIPPED | OUTPATIENT
Start: 2018-07-12 | End: 2018-08-13 | Stop reason: SDUPTHER

## 2018-07-12 NOTE — TELEPHONE ENCOUNTER
From: Mark Alex  Sent: 7/12/2018 2:53 PM EDT  Subject: Medication Renewal Request    [facility] would like a refill of the following medications:     clonazePAM (KLONOPIN) 0.25 MG disintegrating tablet Nani Stacy MD]    Preferred pharmacy: [facility] [address] [place] [address] [phone]

## 2018-07-13 DIAGNOSIS — I10 ESSENTIAL HYPERTENSION: ICD-10-CM

## 2018-07-14 RX ORDER — PRAVASTATIN SODIUM 40 MG
40 TABLET ORAL DAILY
Qty: 90 TABLET | Refills: 1 | Status: SHIPPED | OUTPATIENT
Start: 2018-07-14 | End: 2018-12-10 | Stop reason: SDUPTHER

## 2018-07-14 RX ORDER — SPIRONOLACTONE 50 MG/1
50 TABLET, FILM COATED ORAL DAILY
Qty: 90 TABLET | Refills: 1 | Status: SHIPPED | OUTPATIENT
Start: 2018-07-14 | End: 2018-12-10 | Stop reason: SDUPTHER

## 2018-07-20 DIAGNOSIS — G89.29 CHRONIC INTRACTABLE HEADACHE, UNSPECIFIED HEADACHE TYPE: ICD-10-CM

## 2018-07-20 DIAGNOSIS — R51.9 CHRONIC INTRACTABLE HEADACHE, UNSPECIFIED HEADACHE TYPE: ICD-10-CM

## 2018-07-20 RX ORDER — AMITRIPTYLINE HYDROCHLORIDE 50 MG/1
50 TABLET, FILM COATED ORAL NIGHTLY
Qty: 30 TABLET | Refills: 3 | Status: SHIPPED | OUTPATIENT
Start: 2018-07-20 | End: 2018-11-19 | Stop reason: SDUPTHER

## 2018-07-20 NOTE — TELEPHONE ENCOUNTER
Medication: Elavil  Last visit: 6/25/18  Next visit: 9/17/2018  Last refill: 2/2/18  [facility]    PCP:           Kelsey Villanueva MD

## 2018-07-27 RX ORDER — SUMATRIPTAN 100 MG/1
TABLET, FILM COATED ORAL
Qty: 9 TABLET | Refills: 1 | Status: SHIPPED | OUTPATIENT
Start: 2018-07-27 | End: 2018-09-17 | Stop reason: SDUPTHER

## 2018-08-13 DIAGNOSIS — I10 ESSENTIAL HYPERTENSION: ICD-10-CM

## 2018-08-13 RX ORDER — CLONAZEPAM 0.25 MG/1
0.25 TABLET, ORALLY DISINTEGRATING ORAL 2 TIMES DAILY PRN
Qty: 60 TABLET | Refills: 0 | Status: SHIPPED | OUTPATIENT
Start: 2018-08-13 | End: 2018-09-13 | Stop reason: SDUPTHER

## 2018-08-13 NOTE — TELEPHONE ENCOUNTER
Medication: Klonopin  Last refill: 7/12/18  Pharmacy: [address]  PCP:           Nanette Garcia MD    Last visit: 6/25/18  Next visit: 9/17/2018    All Future Testing planned in [facility]      All future appointments  Future Appointments  Date Time Provider Suzette June   9/17/2018 9:00 AM Nanette Garcia, [address] Maintenance   Topic Date Due    Hepatitis C screen  1960    HIV screen  05/25/1975    Cervical cancer screen  05/25/1981    Shingles Vaccine (1 of 2 - 2 Dose Series) 05/25/2010    Colon cancer screen colonoscopy  05/25/2010    Flu vaccine (1) 09/01/2018    Creatinine monitoring  11/14/2018    Potassium monitoring  01/15/2019    DTaP/Tdap/Td vaccine (2 - Td) 07/11/2019    Breast cancer screen  02/02/2020    Lipid screen  08/26/2022       No results found for: LABA1C                                                                  ( goal A1C is < 7)   No results found for: LABMICR  LDL Cholesterol (mg/dL)   Date Value   08/26/2017 135 (H)                                                  (goal LDL is <100)   AST (U/L)   Date Value   11/14/2017 41 (H)     ALT (U/L)   Date Value   11/14/2017 60 (H)     BUN (mg/dL)   Date Value   11/14/2017 15     BP Readings from Last 3 Encounters:   06/25/18 134/89   04/16/18 139/89   02/02/18 125/85                                                                                     (goal 120/80)      Patient Active Problem List:     Intractable chronic migraine without aura and without status migrainosus     Essential hypertension

## 2018-08-16 DIAGNOSIS — G44.221 CHRONIC TENSION-TYPE HEADACHE, INTRACTABLE: ICD-10-CM

## 2018-08-16 RX ORDER — BACLOFEN 10 MG/1
10 TABLET ORAL 3 TIMES DAILY
Qty: 60 TABLET | Refills: 1 | Status: SHIPPED | OUTPATIENT
Start: 2018-08-16 | End: 2018-10-15 | Stop reason: SDUPTHER

## 2018-08-16 NOTE — TELEPHONE ENCOUNTER
From: Debbie Damon  Sent: 8/15/2018 6:39 PM EDT  Subject: Medication Renewal Request    Debbie Damon would like a refill of the following medications:     baclofen (LIORESAL) 10 MG tablet Nir Isaacs MD]    Preferred pharmacy: [address] [place] [phone] [facility] [phone]    Comment:

## 2018-09-13 DIAGNOSIS — I10 ESSENTIAL HYPERTENSION: ICD-10-CM

## 2018-09-13 RX ORDER — CLONAZEPAM 0.25 MG/1
0.25 TABLET, ORALLY DISINTEGRATING ORAL 2 TIMES DAILY PRN
Qty: 60 TABLET | Refills: 0 | Status: SHIPPED | OUTPATIENT
Start: 2018-09-13 | End: 2018-10-15 | Stop reason: SDUPTHER

## 2018-09-17 ENCOUNTER — OFFICE VISIT (OUTPATIENT)
Dept: FAMILY MEDICINE CLINIC | Age: 58
End: 2018-09-17
Payer: COMMERCIAL

## 2018-09-17 VITALS
SYSTOLIC BLOOD PRESSURE: 137 MMHG | DIASTOLIC BLOOD PRESSURE: 84 MMHG | WEIGHT: 125.6 LBS | OXYGEN SATURATION: 98 % | BODY MASS INDEX: 20.9 KG/M2 | HEART RATE: 75 BPM | RESPIRATION RATE: 16 BRPM

## 2018-09-17 DIAGNOSIS — I10 ESSENTIAL HYPERTENSION: Primary | ICD-10-CM

## 2018-09-17 DIAGNOSIS — G43.719 INTRACTABLE CHRONIC MIGRAINE WITHOUT AURA AND WITHOUT STATUS MIGRAINOSUS: ICD-10-CM

## 2018-09-17 DIAGNOSIS — Z23 NEED FOR INFLUENZA VACCINATION: ICD-10-CM

## 2018-09-17 DIAGNOSIS — Z23 NEED FOR SHINGLES VACCINE: ICD-10-CM

## 2018-09-17 DIAGNOSIS — Z12.11 SCREEN FOR COLON CANCER: ICD-10-CM

## 2018-09-17 PROCEDURE — 90471 IMMUNIZATION ADMIN: CPT | Performed by: FAMILY MEDICINE

## 2018-09-17 PROCEDURE — 90686 IIV4 VACC NO PRSV 0.5 ML IM: CPT | Performed by: FAMILY MEDICINE

## 2018-09-17 PROCEDURE — 99214 OFFICE O/P EST MOD 30 MIN: CPT | Performed by: FAMILY MEDICINE

## 2018-09-17 RX ORDER — TRAMADOL HYDROCHLORIDE 50 MG/1
50 TABLET ORAL EVERY 4 HOURS PRN
Qty: 18 TABLET | Refills: 0 | Status: SHIPPED | OUTPATIENT
Start: 2018-09-17 | End: 2018-09-20

## 2018-09-17 RX ORDER — BUTALBITAL, ACETAMINOPHEN AND CAFFEINE 50; 325; 40 MG/1; MG/1; MG/1
1 TABLET ORAL EVERY 6 HOURS PRN
Qty: 30 TABLET | Refills: 1 | Status: SHIPPED | OUTPATIENT
Start: 2018-09-17 | End: 2021-02-01

## 2018-09-17 RX ORDER — SUMATRIPTAN 100 MG/1
100 TABLET, FILM COATED ORAL
Qty: 27 TABLET | Refills: 1 | Status: SHIPPED | OUTPATIENT
Start: 2018-09-17 | End: 2019-06-10 | Stop reason: SDUPTHER

## 2018-09-17 ASSESSMENT — PATIENT HEALTH QUESTIONNAIRE - PHQ9
SUM OF ALL RESPONSES TO PHQ QUESTIONS 1-9: 0
SUM OF ALL RESPONSES TO PHQ9 QUESTIONS 1 & 2: 0
SUM OF ALL RESPONSES TO PHQ QUESTIONS 1-9: 0
2. FEELING DOWN, DEPRESSED OR HOPELESS: 0
1. LITTLE INTEREST OR PLEASURE IN DOING THINGS: 0

## 2018-09-17 NOTE — PROGRESS NOTES
General FM note    Sadia Devlin is a 62 y.o. female who presents today for follow up on her  medical conditions as noted below. Sadia Devlin is c/o of   Chief Complaint   Patient presents with    3 Month Follow-Up       Patient Active Problem List:     Intractable chronic migraine without aura and without status migrainosus     Essential hypertension     Past Medical History:   Diagnosis Date    Acid reflux     Anemia     Hypertension     Migraine     Mitral valve prolapse     Osteoarthritis       Past Surgical History:   Procedure Laterality Date     SECTION      OTHER SURGICAL HISTORY      uterine ablation    OVARY REMOVAL      TOE SURGERY       Family History   Problem Relation Age of Onset    Neuropathy Mother     Heart Attack Father      Current Outpatient Prescriptions   Medication Sig Dispense Refill    zoster recombinant adjuvanted vaccine (SHINGRIX) 50 MCG SUSR injection Inject 0.5 mLs into the muscle once for 1 dose 0.5 mL 1    butalbital-acetaminophen-caffeine (FIORICET, ESGIC) -40 MG per tablet Take 1 tablet by mouth every 6 hours as needed for Headaches or Migraine 30 tablet 1    SUMAtriptan (IMITREX) 100 MG tablet Take 1 tablet by mouth once as needed for Migraine 27 tablet 1    traMADol (ULTRAM) 50 MG tablet Take 1 tablet by mouth every 4 hours as needed for Pain for up to 3 days. Intended supply: 3 days. Take lowest dose possible to manage pain. 18 tablet 0    clonazePAM (KLONOPIN) 0.25 MG disintegrating tablet Take 1 tablet by mouth 2 times daily as needed for Anxiety for up to 30 days. . 60 tablet 0    baclofen (LIORESAL) 10 MG tablet Take 1 tablet by mouth 3 times daily 60 tablet 1    amitriptyline (ELAVIL) 50 MG tablet Take 1 tablet by mouth nightly 30 tablet 3    pravastatin (PRAVACHOL) 40 MG tablet Take 1 tablet by mouth daily 90 tablet 1    spironolactone (ALDACTONE) 50 MG tablet Take 1 tablet by mouth daily 90 tablet 1    rizatriptan (MAXALT) 10 chest pain and leg swelling. Gastrointestinal: Negative for diarrhea, constipation and blood in stool. Skin: Negative for color change and rash. Objective:   Physical Exam  Constitutional: VS (see above). General appearance: normal development, habitus and attention, no deformities. No distress. Eyes: normal conjunctiva and lids. CAV: RRR, no RMG. No edema lower extremities. Pulmo: CTA bilateral, no CWR. Skin: no rashes, lesions or ulcers. Musculoskeletal: normal gait. Nails: no clubbing or cyanosis. Psychiatric: alert and oriented to place, time and person. Normal mood and affect. Assessment:       Diagnosis Orders   1. Essential hypertension     2. Need for shingles vaccine  zoster recombinant adjuvanted vaccine (SHINGRIX) 50 MCG SUSR injection   3. Intractable chronic migraine without aura and without status migrainosus  butalbital-acetaminophen-caffeine (FIORICET, ESGIC) -40 MG per tablet    traMADol (ULTRAM) 50 MG tablet   4. Screen for colon cancer  Leeroy Nelson MD, Gastroenter[facility] [address]       Plan:   Blood pressure well controlled. Continue current care. Vaccination ordered. Patient also have a flu vaccination. Patient will see a gastroenterologist for colonoscopy. Meds refilled. Call or return to clinic prn if these symptoms worsen or fail to improve as anticipated. I have reviewed the instructions with the patient, answering all questions to her satisfaction. Return in about 3 months (around 12/17/2018), or if symptoms worsen or fail to improve, for [place].   Orders Placed This Encounter   Procedures   Leeroy Nelson MD, [facility]astroenterology [address]     Referral Priority:   Routine     Referral Type:   Consult for Advice and Opinion     Referral Reason:   Specialty Services Required     Referred to Provider:   Nate Bonner MD     Requested Specialty:   Gastroenterology     Number of Visits Requested:   1     Orders Placed This Encounter

## 2018-09-28 RX ORDER — LOSARTAN POTASSIUM 50 MG/1
TABLET ORAL
Qty: 90 TABLET | Refills: 1 | Status: SHIPPED | OUTPATIENT
Start: 2018-09-28 | End: 2018-12-10 | Stop reason: SDUPTHER

## 2018-09-28 RX ORDER — SUMATRIPTAN 100 MG/1
TABLET, FILM COATED ORAL
Qty: 9 TABLET | Refills: 1 | Status: SHIPPED | OUTPATIENT
Start: 2018-09-28 | End: 2018-11-27 | Stop reason: SDUPTHER

## 2018-10-11 ENCOUNTER — TELEPHONE (OUTPATIENT)
Dept: FAMILY MEDICINE CLINIC | Age: 58
End: 2018-10-11

## 2018-10-15 DIAGNOSIS — I10 ESSENTIAL HYPERTENSION: ICD-10-CM

## 2018-10-15 DIAGNOSIS — G44.221 CHRONIC TENSION-TYPE HEADACHE, INTRACTABLE: ICD-10-CM

## 2018-10-15 RX ORDER — CLONAZEPAM 0.25 MG/1
0.25 TABLET, ORALLY DISINTEGRATING ORAL 2 TIMES DAILY PRN
Qty: 60 TABLET | Refills: 0 | Status: SHIPPED | OUTPATIENT
Start: 2018-10-15 | End: 2018-11-12 | Stop reason: SDUPTHER

## 2018-10-15 RX ORDER — BACLOFEN 10 MG/1
10 TABLET ORAL 3 TIMES DAILY
Qty: 60 TABLET | Refills: 1 | Status: SHIPPED | OUTPATIENT
Start: 2018-10-15 | End: 2018-12-10 | Stop reason: SDUPTHER

## 2018-10-17 ENCOUNTER — TELEPHONE (OUTPATIENT)
Dept: GASTROENTEROLOGY | Age: 58
End: 2018-10-17

## 2018-10-18 DIAGNOSIS — Z12.11 ENCOUNTER FOR SCREENING COLONOSCOPY: Primary | ICD-10-CM

## 2018-10-18 RX ORDER — POLYETHYLENE GLYCOL 3350 17 G/17G
POWDER, FOR SOLUTION ORAL
Qty: 255 G | Refills: 0 | Status: SHIPPED | OUTPATIENT
Start: 2018-10-18 | End: 2018-11-17

## 2018-10-29 DIAGNOSIS — G89.29 CHRONIC INTRACTABLE HEADACHE, UNSPECIFIED HEADACHE TYPE: ICD-10-CM

## 2018-10-29 DIAGNOSIS — R51.9 CHRONIC INTRACTABLE HEADACHE, UNSPECIFIED HEADACHE TYPE: ICD-10-CM

## 2018-10-29 RX ORDER — CALCIUM CARBONATE 500(1250)
500 TABLET ORAL DAILY
Qty: 30 TABLET | Refills: 3 | Status: SHIPPED | OUTPATIENT
Start: 2018-10-29 | End: 2018-12-10 | Stop reason: SDUPTHER

## 2018-10-30 ENCOUNTER — OFFICE VISIT (OUTPATIENT)
Dept: FAMILY MEDICINE CLINIC | Age: 58
End: 2018-10-30
Payer: COMMERCIAL

## 2018-10-30 VITALS
DIASTOLIC BLOOD PRESSURE: 92 MMHG | WEIGHT: 124 LBS | HEART RATE: 100 BPM | BODY MASS INDEX: 20.63 KG/M2 | OXYGEN SATURATION: 100 % | TEMPERATURE: 101.5 F | SYSTOLIC BLOOD PRESSURE: 148 MMHG

## 2018-10-30 DIAGNOSIS — B96.89 ACUTE BACTERIAL SINUSITIS: Primary | ICD-10-CM

## 2018-10-30 DIAGNOSIS — J01.90 ACUTE BACTERIAL SINUSITIS: Primary | ICD-10-CM

## 2018-10-30 PROCEDURE — 99213 OFFICE O/P EST LOW 20 MIN: CPT | Performed by: FAMILY MEDICINE

## 2018-10-30 RX ORDER — PREDNISONE 20 MG/1
40 TABLET ORAL DAILY
Qty: 10 TABLET | Refills: 0 | Status: SHIPPED | OUTPATIENT
Start: 2018-10-30 | End: 2018-11-04

## 2018-10-30 RX ORDER — TRAMADOL HYDROCHLORIDE 50 MG/1
50 TABLET ORAL EVERY 6 HOURS PRN
Qty: 12 TABLET | Refills: 0 | Status: SHIPPED | OUTPATIENT
Start: 2018-10-30 | End: 2019-03-18 | Stop reason: SDUPTHER

## 2018-10-30 RX ORDER — CLARITHROMYCIN 500 MG/1
500 TABLET, COATED ORAL 2 TIMES DAILY
Qty: 14 TABLET | Refills: 0 | Status: SHIPPED | OUTPATIENT
Start: 2018-10-30 | End: 2018-11-06

## 2018-11-06 ENCOUNTER — TELEPHONE (OUTPATIENT)
Dept: FAMILY MEDICINE CLINIC | Age: 58
End: 2018-11-06

## 2018-11-06 DIAGNOSIS — R05.9 COUGH: Primary | ICD-10-CM

## 2018-11-06 RX ORDER — FLUTICASONE PROPIONATE 50 MCG
1 SPRAY, SUSPENSION (ML) NASAL DAILY
Qty: 1 BOTTLE | Refills: 3 | Status: ON HOLD | OUTPATIENT
Start: 2018-11-06 | End: 2018-12-27

## 2018-11-06 RX ORDER — GUAIFENESIN AND CODEINE PHOSPHATE 100; 10 MG/5ML; MG/5ML
5 SOLUTION ORAL EVERY 4 HOURS PRN
Qty: 236 ML | Refills: 0 | Status: SHIPPED | OUTPATIENT
Start: 2018-11-06 | End: 2018-11-13

## 2018-11-06 NOTE — TELEPHONE ENCOUNTER
Patient probably still have some congestion, which causes postnasal drip and probably that's why she has the cough, but this still could be also a viral bronchitis-like symptoms in her lungs which also can causecough. Medication called in. I'm happy. She is better. Thank you.

## 2018-11-12 DIAGNOSIS — I10 ESSENTIAL HYPERTENSION: ICD-10-CM

## 2018-11-13 ENCOUNTER — TELEPHONE (OUTPATIENT)
Dept: GASTROENTEROLOGY | Age: 58
End: 2018-11-13

## 2018-11-13 RX ORDER — CLONAZEPAM 0.25 MG/1
0.25 TABLET, ORALLY DISINTEGRATING ORAL 2 TIMES DAILY PRN
Qty: 60 TABLET | Refills: 0 | Status: SHIPPED | OUTPATIENT
Start: 2018-11-13 | End: 2018-12-10 | Stop reason: SDUPTHER

## 2018-11-16 ENCOUNTER — TELEPHONE (OUTPATIENT)
Dept: GASTROENTEROLOGY | Age: 58
End: 2018-11-16

## 2018-11-19 DIAGNOSIS — G89.29 CHRONIC INTRACTABLE HEADACHE, UNSPECIFIED HEADACHE TYPE: ICD-10-CM

## 2018-11-19 DIAGNOSIS — R51.9 CHRONIC INTRACTABLE HEADACHE, UNSPECIFIED HEADACHE TYPE: ICD-10-CM

## 2018-11-19 RX ORDER — AMITRIPTYLINE HYDROCHLORIDE 50 MG/1
50 TABLET, FILM COATED ORAL NIGHTLY
Qty: 30 TABLET | Refills: 3 | Status: SHIPPED | OUTPATIENT
Start: 2018-11-19 | End: 2018-12-10 | Stop reason: SDUPTHER

## 2018-11-27 RX ORDER — SUMATRIPTAN 100 MG/1
TABLET, FILM COATED ORAL
Qty: 9 TABLET | Refills: 1 | Status: SHIPPED | OUTPATIENT
Start: 2018-11-27 | End: 2018-12-10 | Stop reason: SDUPTHER

## 2018-12-10 ENCOUNTER — OFFICE VISIT (OUTPATIENT)
Dept: FAMILY MEDICINE CLINIC | Age: 58
End: 2018-12-10
Payer: COMMERCIAL

## 2018-12-10 VITALS
DIASTOLIC BLOOD PRESSURE: 102 MMHG | BODY MASS INDEX: 20.8 KG/M2 | OXYGEN SATURATION: 98 % | WEIGHT: 125 LBS | HEART RATE: 80 BPM | TEMPERATURE: 96.9 F | SYSTOLIC BLOOD PRESSURE: 153 MMHG

## 2018-12-10 DIAGNOSIS — I10 ESSENTIAL HYPERTENSION: ICD-10-CM

## 2018-12-10 DIAGNOSIS — R51.9 CHRONIC INTRACTABLE HEADACHE, UNSPECIFIED HEADACHE TYPE: ICD-10-CM

## 2018-12-10 DIAGNOSIS — Z11.59 NEED FOR HEPATITIS C SCREENING TEST: ICD-10-CM

## 2018-12-10 DIAGNOSIS — G44.221 CHRONIC TENSION-TYPE HEADACHE, INTRACTABLE: ICD-10-CM

## 2018-12-10 DIAGNOSIS — Z12.11 COLON CANCER SCREENING: ICD-10-CM

## 2018-12-10 DIAGNOSIS — Z12.39 BREAST CANCER SCREENING: ICD-10-CM

## 2018-12-10 DIAGNOSIS — Z00.01 ENCOUNTER FOR WELL ADULT EXAM WITH ABNORMAL FINDINGS: Primary | ICD-10-CM

## 2018-12-10 DIAGNOSIS — G89.29 CHRONIC INTRACTABLE HEADACHE, UNSPECIFIED HEADACHE TYPE: ICD-10-CM

## 2018-12-10 DIAGNOSIS — Z23 NEED FOR SHINGLES VACCINE: ICD-10-CM

## 2018-12-10 PROCEDURE — 99213 OFFICE O/P EST LOW 20 MIN: CPT | Performed by: FAMILY MEDICINE

## 2018-12-10 PROCEDURE — 99396 PREV VISIT EST AGE 40-64: CPT | Performed by: FAMILY MEDICINE

## 2018-12-10 RX ORDER — SPIRONOLACTONE 50 MG/1
50 TABLET, FILM COATED ORAL DAILY
Qty: 90 TABLET | Refills: 1 | Status: SHIPPED | OUTPATIENT
Start: 2018-12-10 | End: 2019-06-25 | Stop reason: SDUPTHER

## 2018-12-10 RX ORDER — BACLOFEN 10 MG/1
10 TABLET ORAL 3 TIMES DAILY
Qty: 270 TABLET | Refills: 1 | Status: SHIPPED | OUTPATIENT
Start: 2018-12-10 | End: 2019-03-15 | Stop reason: SDUPTHER

## 2018-12-10 RX ORDER — CLONAZEPAM 0.25 MG/1
0.25 TABLET, ORALLY DISINTEGRATING ORAL 2 TIMES DAILY PRN
Qty: 60 TABLET | Refills: 0 | Status: SHIPPED | OUTPATIENT
Start: 2018-12-10 | End: 2019-01-11 | Stop reason: SDUPTHER

## 2018-12-10 RX ORDER — AMITRIPTYLINE HYDROCHLORIDE 50 MG/1
50 TABLET, FILM COATED ORAL NIGHTLY
Qty: 90 TABLET | Refills: 1 | Status: SHIPPED | OUTPATIENT
Start: 2018-12-10 | End: 2019-06-10 | Stop reason: SDUPTHER

## 2018-12-10 RX ORDER — PRAVASTATIN SODIUM 40 MG
40 TABLET ORAL DAILY
Qty: 90 TABLET | Refills: 1 | Status: SHIPPED | OUTPATIENT
Start: 2018-12-10 | End: 2019-06-29 | Stop reason: SDUPTHER

## 2018-12-10 RX ORDER — CALCIUM CARBONATE 500(1250)
500 TABLET ORAL DAILY
Qty: 90 TABLET | Refills: 1 | Status: SHIPPED | OUTPATIENT
Start: 2018-12-10 | End: 2019-06-27 | Stop reason: SDUPTHER

## 2018-12-10 RX ORDER — SUMATRIPTAN 100 MG/1
100 TABLET, FILM COATED ORAL
Qty: 27 TABLET | Refills: 1 | Status: SHIPPED | OUTPATIENT
Start: 2018-12-10 | End: 2020-05-23 | Stop reason: SDUPTHER

## 2018-12-10 RX ORDER — LOSARTAN POTASSIUM 50 MG/1
75 TABLET ORAL DAILY
Qty: 135 TABLET | Refills: 1 | Status: SHIPPED | OUTPATIENT
Start: 2018-12-10 | End: 2019-03-18 | Stop reason: ALTCHOICE

## 2018-12-10 NOTE — PROGRESS NOTES
Headaches or Migraine 30 tablet 1    SUMAtriptan (IMITREX) 100 MG tablet Take 1 tablet by mouth once as needed for Migraine 27 tablet 1    rizatriptan (MAXALT) 10 MG tablet Take 1 tablet by mouth once as needed for Migraine May repeat in 2 hours if needed 30 tablet 3    promethazine (PHENERGAN) 25 MG tablet Take 1 tablet by mouth every 8 hours as needed for Nausea 30 tablet 0    Magnesium Oxide 400 MG CAPS Take 400 mg by mouth      MELATONIN-PYRIDOXINE PO Take by mouth      Misc. Devices MISC 1 each by Does not apply route once for 1 dose 1 each 0    busPIRone (BUSPAR) 15 MG tablet 15 mg 2 times daily       acetaminophen (TYLENOL) 500 MG tablet Take 500 mg by mouth 2 times daily Indications: 2 PO BID       No current facility-administered medications on file prior to visit. Allergies   Allergen Reactions    Percocet [Oxycodone-Acetaminophen] Hives       Do you take any herbs or supplements that were not prescribed by a doctor? not asked  Are you taking calcium supplements? yes  Are you taking aspirin daily? not applicable    Review of Systems  Do you have pain that bothers you in your daily life? yes -- R shoulder. Review of Systems   Constitutional: Negative for fever and unexpected weight change. HENT: Negative for ear pain, congestion, sore throat and rhinorrhea. Eyes: Negative for itching and visual disturbance. Respiratory: Negative for cough and shortness of breath. Cardiovascular: Negative for chest pain and leg swelling. Gastrointestinal: Negative for diarrhea, constipation and blood in stool. Endocrine: Negative for polydipsia and polyuria. Genitourinary: Negative for dysuria and hematuria. Musculoskeletal: Negative for back pain and gait problem. positive for right shoulder pains. Skin: Negative for color change and rash. Neurological: Negative for dizziness and headaches. Psychiatric/Behavioral: Negative for confusion and agitation.     Objective:   HENT:   BP (!)

## 2018-12-19 ENCOUNTER — NURSE ONLY (OUTPATIENT)
Dept: FAMILY MEDICINE CLINIC | Age: 58
End: 2018-12-19

## 2018-12-19 VITALS — SYSTOLIC BLOOD PRESSURE: 131 MMHG | DIASTOLIC BLOOD PRESSURE: 89 MMHG | HEART RATE: 100 BPM

## 2018-12-19 DIAGNOSIS — I10 HYPERTENSION, UNSPECIFIED TYPE: Primary | ICD-10-CM

## 2018-12-26 ENCOUNTER — HOSPITAL ENCOUNTER (OUTPATIENT)
Facility: CLINIC | Age: 58
Discharge: HOME | End: 2018-12-26
Payer: COMMERCIAL

## 2018-12-26 ENCOUNTER — TELEPHONE (OUTPATIENT)
Dept: GASTROENTEROLOGY | Age: 58
End: 2018-12-26

## 2018-12-26 DIAGNOSIS — Z11.59 NEED FOR HEPATITIS C SCREENING TEST: ICD-10-CM

## 2018-12-26 DIAGNOSIS — Z00.01 ENCOUNTER FOR WELL ADULT EXAM WITH ABNORMAL FINDINGS: ICD-10-CM

## 2018-12-26 LAB
-: NORMAL
ABSOLUTE EOS #: 0.29 K/UL (ref 0–0.44)
ABSOLUTE IMMATURE GRANULOCYTE: <0.03 K/UL (ref 0–0.3)
ABSOLUTE LYMPH #: 2.4 K/UL (ref 1.1–3.7)
ABSOLUTE MONO #: 0.75 K/UL (ref 0.1–1.2)
ALBUMIN SERPL-MCNC: 4.3 G/DL (ref 3.5–5.2)
ALBUMIN/GLOBULIN RATIO: 1.4 (ref 1–2.5)
ALP BLD-CCNC: 91 U/L (ref 35–104)
ALT SERPL-CCNC: 24 U/L (ref 5–33)
AMORPHOUS: NORMAL
ANION GAP SERPL CALCULATED.3IONS-SCNC: 11 MMOL/L (ref 9–17)
AST SERPL-CCNC: 25 U/L
BACTERIA: NORMAL
BASOPHILS # BLD: 1 % (ref 0–2)
BASOPHILS ABSOLUTE: 0.04 K/UL (ref 0–0.2)
BILIRUB SERPL-MCNC: 0.46 MG/DL (ref 0.3–1.2)
BILIRUBIN URINE: NEGATIVE
BUN BLDV-MCNC: 14 MG/DL (ref 6–20)
BUN/CREAT BLD: NORMAL (ref 9–20)
CALCIUM SERPL-MCNC: 9.9 MG/DL (ref 8.6–10.4)
CASTS UA: NORMAL /LPF (ref 0–8)
CHLORIDE BLD-SCNC: 102 MMOL/L (ref 98–107)
CHOLESTEROL/HDL RATIO: 2.8
CHOLESTEROL: 168 MG/DL
CO2: 27 MMOL/L (ref 20–31)
COLOR: YELLOW
COMMENT UA: ABNORMAL
CREAT SERPL-MCNC: 0.75 MG/DL (ref 0.5–0.9)
CRYSTALS, UA: NORMAL /HPF
DIFFERENTIAL TYPE: ABNORMAL
EOSINOPHILS RELATIVE PERCENT: 5 % (ref 1–4)
EPITHELIAL CELLS UA: NORMAL /HPF (ref 0–5)
GFR AFRICAN AMERICAN: >60 ML/MIN
GFR NON-AFRICAN AMERICAN: >60 ML/MIN
GFR SERPL CREATININE-BSD FRML MDRD: NORMAL ML/MIN/{1.73_M2}
GFR SERPL CREATININE-BSD FRML MDRD: NORMAL ML/MIN/{1.73_M2}
GLUCOSE BLD-MCNC: 88 MG/DL (ref 70–99)
GLUCOSE URINE: NEGATIVE
HCT VFR BLD CALC: 48.9 % (ref 36.3–47.1)
HDLC SERPL-MCNC: 59 MG/DL
HEMOGLOBIN: 16 G/DL (ref 11.9–15.1)
HEPATITIS C ANTIBODY: NONREACTIVE
IMMATURE GRANULOCYTES: 0 %
KETONES, URINE: NEGATIVE
LDL CHOLESTEROL: 97 MG/DL (ref 0–130)
LEUKOCYTE ESTERASE, URINE: NEGATIVE
LYMPHOCYTES # BLD: 41 % (ref 24–43)
MCH RBC QN AUTO: 30.4 PG (ref 25.2–33.5)
MCHC RBC AUTO-ENTMCNC: 32.7 G/DL (ref 28.4–34.8)
MCV RBC AUTO: 92.8 FL (ref 82.6–102.9)
MONOCYTES # BLD: 13 % (ref 3–12)
MUCUS: NORMAL
NITRITE, URINE: NEGATIVE
NRBC AUTOMATED: 0 PER 100 WBC
OTHER OBSERVATIONS UA: NORMAL
PDW BLD-RTO: 12.9 % (ref 11.8–14.4)
PH UA: 8 (ref 5–8)
PLATELET # BLD: 218 K/UL (ref 138–453)
PLATELET ESTIMATE: ABNORMAL
PMV BLD AUTO: 11.5 FL (ref 8.1–13.5)
POTASSIUM SERPL-SCNC: 4.1 MMOL/L (ref 3.7–5.3)
PROTEIN UA: NEGATIVE
RBC # BLD: 5.27 M/UL (ref 3.95–5.11)
RBC # BLD: ABNORMAL 10*6/UL
RBC UA: NORMAL /HPF (ref 0–4)
RENAL EPITHELIAL, UA: NORMAL /HPF
SEG NEUTROPHILS: 40 % (ref 36–65)
SEGMENTED NEUTROPHILS ABSOLUTE COUNT: 2.36 K/UL (ref 1.5–8.1)
SODIUM BLD-SCNC: 140 MMOL/L (ref 135–144)
SPECIFIC GRAVITY UA: 1.02 (ref 1–1.03)
THYROXINE, FREE: 1.15 NG/DL (ref 0.93–1.7)
TOTAL PROTEIN: 7.4 G/DL (ref 6.4–8.3)
TRICHOMONAS: NORMAL
TRIGL SERPL-MCNC: 58 MG/DL
TSH SERPL DL<=0.05 MIU/L-ACNC: 1.93 MIU/L (ref 0.3–5)
TURBIDITY: ABNORMAL
URINE HGB: NEGATIVE
UROBILINOGEN, URINE: NORMAL
VLDLC SERPL CALC-MCNC: NORMAL MG/DL (ref 1–30)
WBC # BLD: 5.9 K/UL (ref 3.5–11.3)
WBC # BLD: ABNORMAL 10*3/UL
WBC UA: NORMAL /HPF (ref 0–5)
YEAST: NORMAL

## 2018-12-26 PROCEDURE — 84443 ASSAY THYROID STIM HORMONE: CPT

## 2018-12-26 PROCEDURE — 36415 COLL VENOUS BLD VENIPUNCTURE: CPT

## 2018-12-26 PROCEDURE — 81001 URINALYSIS AUTO W/SCOPE: CPT

## 2018-12-26 PROCEDURE — 85025 COMPLETE CBC W/AUTO DIFF WBC: CPT

## 2018-12-26 PROCEDURE — 84439 ASSAY OF FREE THYROXINE: CPT

## 2018-12-26 PROCEDURE — 80061 LIPID PANEL: CPT

## 2018-12-26 PROCEDURE — 80053 COMPREHEN METABOLIC PANEL: CPT

## 2018-12-26 PROCEDURE — 86803 HEPATITIS C AB TEST: CPT

## 2018-12-26 PROCEDURE — 86376 MICROSOMAL ANTIBODY EACH: CPT

## 2018-12-27 ENCOUNTER — HOSPITAL ENCOUNTER (OUTPATIENT)
Age: 58
Setting detail: OUTPATIENT SURGERY
Discharge: HOME | End: 2018-12-27
Attending: INTERNAL MEDICINE | Admitting: INTERNAL MEDICINE
Payer: COMMERCIAL

## 2018-12-27 ENCOUNTER — ANESTHESIA (OUTPATIENT)
Dept: OPERATING ROOM | Age: 58
End: 2018-12-27
Payer: COMMERCIAL

## 2018-12-27 ENCOUNTER — ANESTHESIA EVENT (OUTPATIENT)
Dept: OPERATING ROOM | Age: 58
End: 2018-12-27
Payer: COMMERCIAL

## 2018-12-27 VITALS
OXYGEN SATURATION: 99 % | TEMPERATURE: 97 F | DIASTOLIC BLOOD PRESSURE: 89 MMHG | SYSTOLIC BLOOD PRESSURE: 149 MMHG | HEART RATE: 78 BPM | WEIGHT: 119.71 LBS | BODY MASS INDEX: 19.94 KG/M2 | HEIGHT: 65 IN | RESPIRATION RATE: 26 BRPM

## 2018-12-27 VITALS — OXYGEN SATURATION: 98 % | DIASTOLIC BLOOD PRESSURE: 58 MMHG | SYSTOLIC BLOOD PRESSURE: 103 MMHG

## 2018-12-27 LAB — THYROID PEROXIDASE (TPO) AB: 10.8 IU/ML (ref 0–35)

## 2018-12-27 PROCEDURE — 7100000001 HC PACU RECOVERY - ADDTL 15 MIN: Performed by: INTERNAL MEDICINE

## 2018-12-27 PROCEDURE — 7100000000 HC PACU RECOVERY - FIRST 15 MIN: Performed by: INTERNAL MEDICINE

## 2018-12-27 PROCEDURE — 2580000003 HC RX 258: Performed by: ANESTHESIOLOGY

## 2018-12-27 PROCEDURE — 3609010300 HC COLONOSCOPY W/BIOPSY SINGLE/MULTIPLE: Performed by: INTERNAL MEDICINE

## 2018-12-27 PROCEDURE — 2500000003 HC RX 250 WO HCPCS: Performed by: NURSE ANESTHETIST, CERTIFIED REGISTERED

## 2018-12-27 PROCEDURE — 88305 TISSUE EXAM BY PATHOLOGIST: CPT

## 2018-12-27 PROCEDURE — 3700000000 HC ANESTHESIA ATTENDED CARE: Performed by: INTERNAL MEDICINE

## 2018-12-27 PROCEDURE — 2709999900 HC NON-CHARGEABLE SUPPLY: Performed by: INTERNAL MEDICINE

## 2018-12-27 PROCEDURE — 45380 COLONOSCOPY AND BIOPSY: CPT | Performed by: INTERNAL MEDICINE

## 2018-12-27 PROCEDURE — 2580000003 HC RX 258: Performed by: NURSE ANESTHETIST, CERTIFIED REGISTERED

## 2018-12-27 PROCEDURE — 3700000001 HC ADD 15 MINUTES (ANESTHESIA): Performed by: INTERNAL MEDICINE

## 2018-12-27 PROCEDURE — 6360000002 HC RX W HCPCS: Performed by: NURSE ANESTHETIST, CERTIFIED REGISTERED

## 2018-12-27 RX ORDER — LIDOCAINE HYDROCHLORIDE 10 MG/ML
5 INJECTION, SOLUTION INFILTRATION; PERINEURAL ONCE
Status: DISCONTINUED | OUTPATIENT
Start: 2018-12-27 | End: 2018-12-27 | Stop reason: HOSPADM

## 2018-12-27 RX ORDER — PROPOFOL 10 MG/ML
INJECTION, EMULSION INTRAVENOUS PRN
Status: DISCONTINUED | OUTPATIENT
Start: 2018-12-27 | End: 2018-12-27 | Stop reason: SDUPTHER

## 2018-12-27 RX ORDER — LIDOCAINE HYDROCHLORIDE 20 MG/ML
INJECTION, SOLUTION INFILTRATION; PERINEURAL PRN
Status: DISCONTINUED | OUTPATIENT
Start: 2018-12-27 | End: 2018-12-27 | Stop reason: SDUPTHER

## 2018-12-27 RX ORDER — SODIUM CHLORIDE, SODIUM LACTATE, POTASSIUM CHLORIDE, CALCIUM CHLORIDE 600; 310; 30; 20 MG/100ML; MG/100ML; MG/100ML; MG/100ML
INJECTION, SOLUTION INTRAVENOUS CONTINUOUS
Status: DISCONTINUED | OUTPATIENT
Start: 2018-12-27 | End: 2018-12-27 | Stop reason: HOSPADM

## 2018-12-27 RX ORDER — SODIUM CHLORIDE, SODIUM LACTATE, POTASSIUM CHLORIDE, CALCIUM CHLORIDE 600; 310; 30; 20 MG/100ML; MG/100ML; MG/100ML; MG/100ML
INJECTION, SOLUTION INTRAVENOUS CONTINUOUS PRN
Status: DISCONTINUED | OUTPATIENT
Start: 2018-12-27 | End: 2018-12-27 | Stop reason: SDUPTHER

## 2018-12-27 RX ORDER — ONDANSETRON 2 MG/ML
4 INJECTION INTRAMUSCULAR; INTRAVENOUS
Status: DISCONTINUED | OUTPATIENT
Start: 2018-12-27 | End: 2018-12-27 | Stop reason: HOSPADM

## 2018-12-27 RX ADMIN — PROPOFOL 60 MG: 10 INJECTION, EMULSION INTRAVENOUS at 10:24

## 2018-12-27 RX ADMIN — PROPOFOL 60 MG: 10 INJECTION, EMULSION INTRAVENOUS at 10:26

## 2018-12-27 RX ADMIN — LIDOCAINE HYDROCHLORIDE 60 MG: 20 INJECTION, SOLUTION INFILTRATION; PERINEURAL at 10:20

## 2018-12-27 RX ADMIN — SODIUM CHLORIDE, POTASSIUM CHLORIDE, SODIUM LACTATE AND CALCIUM CHLORIDE: 600; 310; 30; 20 INJECTION, SOLUTION INTRAVENOUS at 09:46

## 2018-12-27 RX ADMIN — PROPOFOL 60 MG: 10 INJECTION, EMULSION INTRAVENOUS at 10:37

## 2018-12-27 RX ADMIN — PROPOFOL 60 MG: 10 INJECTION, EMULSION INTRAVENOUS at 10:20

## 2018-12-27 RX ADMIN — SODIUM CHLORIDE, POTASSIUM CHLORIDE, SODIUM LACTATE AND CALCIUM CHLORIDE: 600; 310; 30; 20 INJECTION, SOLUTION INTRAVENOUS at 10:19

## 2018-12-27 RX ADMIN — PROPOFOL 60 MG: 10 INJECTION, EMULSION INTRAVENOUS at 10:30

## 2018-12-27 RX ADMIN — PROPOFOL 60 MG: 10 INJECTION, EMULSION INTRAVENOUS at 10:33

## 2018-12-27 RX ADMIN — PROPOFOL 60 MG: 10 INJECTION, EMULSION INTRAVENOUS at 10:22

## 2018-12-27 ASSESSMENT — PULMONARY FUNCTION TESTS
PIF_VALUE: 1

## 2018-12-27 ASSESSMENT — PAIN - FUNCTIONAL ASSESSMENT: PAIN_FUNCTIONAL_ASSESSMENT: 0-10

## 2018-12-27 ASSESSMENT — ENCOUNTER SYMPTOMS: SHORTNESS OF BREATH: 0

## 2018-12-27 NOTE — H&P
Yes Yamile Escamilla MD   Magnesium Oxide 400 MG CAPS Take 400 mg by mouth   Yes Historical Provider, MD   acetaminophen (TYLENOL) 500 MG tablet Take 500 mg by mouth 2 times daily Indications: 2 PO BID   Yes Historical Provider, MD   zoster recombinant adjuvanted vaccine [facility]) 50 MCG/0.5ML SUSR injection 1 dose now and repeat in 2-6 months 12/10/18   Yamile Escamilla MD   SUMAtriptan (IMITREX) 100 MG tablet Take 1 tablet by mouth once as needed for Migraine 9/17/18 9/17/18  Yamile Escamilla MD   rizatriptan (MAXALT) 10 MG tablet Take 1 tablet by mouth once as needed for Migraine May repeat in 2 hours if needed 6/25/18 6/25/18  Yamile Escamilla MD   promethazine (PHENERGAN) 25 MG tablet Take 1 tablet by mouth every 8 hours as needed for Nausea 1/30/18   Yamile Escamilla MD   Misc. Devices MISC 1 each by Does not apply route once for 1 dose 11/15/17 11/15/17  Yamile Escamilla MD       This is a 62 y.o. female who is pleasant, cooperative, alert and oriented x3, in no acute distress. Heart: Heart sounds are normal.  HR regular rate and rhythm without murmur, gallop or rub. Lungs: Normal respiratory effort with good air exchange and clear to auscultation without wheezes or rales bilaterally   Abdomen: soft, nontender, nondistended with bowel sounds .        Labs:  Recent Labs      12/26/18   0850   HGB  16.0*   HCT  48.9*   WBC  5.9   MCV  92.8   PLT  218   NA  140   K  4.1   CL  102   CO2  27   BUN  14   CREATININE  0.75   GLUCOSE  88   AST  25   ALT  24   LABALBU  4.3       CHARLOTTE FRAIRE, ANP-BC  Electronically signed 12/27/2018 at 9:52 AM       Yamile Escamilla MD Physician Signed   Progress Notes Encounter Date: 12/10/2018   Related encounter: Office Visit from 12/10/2018 in [facility]       Expand All Collapse All    []Manual[]Template  []Copied  Subjective:       Thee Tian is a 62 y.o. female and is here for a comprehensive physical exam.  The patient reports problems - stress !!!

## 2018-12-27 NOTE — OP NOTE
Tortuous colon. Once maximally inserted, the endoscope was withdrawn and the mucosa was carefully inspected. The mucosal exam showed mild scattered erythema with linear erosions- Bx obtained. Retroflexion was performed in the rectum and showed no pathology. Withdrawal time was 11 minutes. RECOMMENDATIONS:   1) Follow up with referring provider, as previously scheduled. 2) Follow up on pathology report.    3) Continue interval colon cancer screening (i.e repeat colonoscopy in 10 years)         Washington Bell MD [facility]

## 2018-12-28 LAB — SURGICAL PATHOLOGY REPORT: NORMAL

## 2019-01-11 DIAGNOSIS — I10 ESSENTIAL HYPERTENSION: ICD-10-CM

## 2019-01-11 RX ORDER — CLONAZEPAM 0.25 MG/1
0.25 TABLET, ORALLY DISINTEGRATING ORAL 2 TIMES DAILY PRN
Qty: 60 TABLET | Refills: 0 | Status: SHIPPED | OUTPATIENT
Start: 2019-01-11 | End: 2019-02-11 | Stop reason: SDUPTHER

## 2019-01-17 ENCOUNTER — OFFICE VISIT (OUTPATIENT)
Dept: FAMILY MEDICINE CLINIC | Age: 59
End: 2019-01-17
Payer: COMMERCIAL

## 2019-01-17 VITALS
RESPIRATION RATE: 16 BRPM | DIASTOLIC BLOOD PRESSURE: 82 MMHG | BODY MASS INDEX: 20.99 KG/M2 | TEMPERATURE: 97 F | WEIGHT: 126 LBS | SYSTOLIC BLOOD PRESSURE: 139 MMHG | HEART RATE: 86 BPM | HEIGHT: 65 IN

## 2019-01-17 DIAGNOSIS — J01.90 ACUTE BACTERIAL SINUSITIS: Primary | ICD-10-CM

## 2019-01-17 DIAGNOSIS — B96.89 ACUTE BACTERIAL SINUSITIS: Primary | ICD-10-CM

## 2019-01-17 PROCEDURE — 99213 OFFICE O/P EST LOW 20 MIN: CPT | Performed by: FAMILY MEDICINE

## 2019-01-17 RX ORDER — GUAIFENESIN 600 MG/1
600 TABLET, EXTENDED RELEASE ORAL 2 TIMES DAILY
Qty: 120 TABLET | Refills: 5 | Status: SHIPPED | OUTPATIENT
Start: 2019-01-17 | End: 2021-02-01

## 2019-01-17 RX ORDER — FLUTICASONE PROPIONATE 50 MCG
2 SPRAY, SUSPENSION (ML) NASAL DAILY
Qty: 1 BOTTLE | Refills: 11 | Status: SHIPPED | OUTPATIENT
Start: 2019-01-17 | End: 2021-02-01

## 2019-01-17 RX ORDER — CALCIUM CARBONATE 500(1250)
TABLET ORAL
Refills: 1 | COMMUNITY
Start: 2018-12-10 | End: 2019-03-14 | Stop reason: SDUPTHER

## 2019-01-17 RX ORDER — AMOXICILLIN AND CLAVULANATE POTASSIUM 875; 125 MG/1; MG/1
1 TABLET, FILM COATED ORAL 2 TIMES DAILY WITH MEALS
Qty: 20 TABLET | Refills: 0 | Status: SHIPPED | OUTPATIENT
Start: 2019-01-17 | End: 2019-01-27

## 2019-01-17 ASSESSMENT — PATIENT HEALTH QUESTIONNAIRE - PHQ9
2. FEELING DOWN, DEPRESSED OR HOPELESS: 0
SUM OF ALL RESPONSES TO PHQ QUESTIONS 1-9: 0
SUM OF ALL RESPONSES TO PHQ9 QUESTIONS 1 & 2: 0
1. LITTLE INTEREST OR PLEASURE IN DOING THINGS: 0
SUM OF ALL RESPONSES TO PHQ QUESTIONS 1-9: 0

## 2019-02-11 DIAGNOSIS — I10 ESSENTIAL HYPERTENSION: ICD-10-CM

## 2019-02-11 RX ORDER — CLONAZEPAM 0.25 MG/1
0.25 TABLET, ORALLY DISINTEGRATING ORAL 2 TIMES DAILY PRN
Qty: 60 TABLET | Refills: 0 | Status: SHIPPED | OUTPATIENT
Start: 2019-02-11 | End: 2019-03-18 | Stop reason: SDUPTHER

## 2019-03-14 ENCOUNTER — TELEPHONE (OUTPATIENT)
Dept: FAMILY MEDICINE CLINIC | Age: 59
End: 2019-03-14

## 2019-03-14 DIAGNOSIS — G44.221 CHRONIC TENSION-TYPE HEADACHE, INTRACTABLE: ICD-10-CM

## 2019-03-15 RX ORDER — BACLOFEN 10 MG/1
10 TABLET ORAL 3 TIMES DAILY
Qty: 270 TABLET | Refills: 1 | Status: SHIPPED | OUTPATIENT
Start: 2019-03-15 | End: 2019-03-25 | Stop reason: SDUPTHER

## 2019-03-15 RX ORDER — CALCIUM CARBONATE 500(1250)
TABLET ORAL
Qty: 90 TABLET | Refills: 1 | Status: SHIPPED | OUTPATIENT
Start: 2019-03-15 | End: 2019-06-27 | Stop reason: SDUPTHER

## 2019-03-18 ENCOUNTER — OFFICE VISIT (OUTPATIENT)
Dept: FAMILY MEDICINE CLINIC | Age: 59
End: 2019-03-18
Payer: COMMERCIAL

## 2019-03-18 VITALS
HEART RATE: 92 BPM | WEIGHT: 125 LBS | DIASTOLIC BLOOD PRESSURE: 92 MMHG | SYSTOLIC BLOOD PRESSURE: 149 MMHG | OXYGEN SATURATION: 99 % | BODY MASS INDEX: 20.8 KG/M2

## 2019-03-18 DIAGNOSIS — J01.90 ACUTE BACTERIAL SINUSITIS: ICD-10-CM

## 2019-03-18 DIAGNOSIS — B96.89 ACUTE BACTERIAL SINUSITIS: ICD-10-CM

## 2019-03-18 DIAGNOSIS — I10 ESSENTIAL HYPERTENSION: ICD-10-CM

## 2019-03-18 PROCEDURE — 99213 OFFICE O/P EST LOW 20 MIN: CPT | Performed by: FAMILY MEDICINE

## 2019-03-18 RX ORDER — CLONAZEPAM 0.25 MG/1
0.25 TABLET, ORALLY DISINTEGRATING ORAL 2 TIMES DAILY PRN
Qty: 60 TABLET | Refills: 0 | Status: SHIPPED | OUTPATIENT
Start: 2019-03-18 | End: 2019-04-15 | Stop reason: SDUPTHER

## 2019-03-18 RX ORDER — TRAMADOL HYDROCHLORIDE 50 MG/1
50 TABLET ORAL EVERY 6 HOURS PRN
Qty: 12 TABLET | Refills: 0 | Status: SHIPPED | OUTPATIENT
Start: 2019-03-18 | End: 2019-03-21

## 2019-03-18 RX ORDER — LOSARTAN POTASSIUM 100 MG/1
100 TABLET ORAL DAILY
Qty: 30 TABLET | Refills: 5 | Status: SHIPPED | OUTPATIENT
Start: 2019-03-18 | End: 2019-07-10 | Stop reason: SDUPTHER

## 2019-03-25 DIAGNOSIS — G44.221 CHRONIC TENSION-TYPE HEADACHE, INTRACTABLE: ICD-10-CM

## 2019-03-25 RX ORDER — BACLOFEN 10 MG/1
10 TABLET ORAL 3 TIMES DAILY
Qty: 270 TABLET | Refills: 1 | Status: SHIPPED | OUTPATIENT
Start: 2019-03-25 | End: 2019-11-24 | Stop reason: SDUPTHER

## 2019-04-15 DIAGNOSIS — I10 ESSENTIAL HYPERTENSION: ICD-10-CM

## 2019-04-15 RX ORDER — CLONAZEPAM 0.25 MG/1
0.25 TABLET, ORALLY DISINTEGRATING ORAL 2 TIMES DAILY PRN
Qty: 60 TABLET | Refills: 0 | Status: SHIPPED | OUTPATIENT
Start: 2019-04-15 | End: 2019-05-17 | Stop reason: SDUPTHER

## 2019-05-17 DIAGNOSIS — I10 ESSENTIAL HYPERTENSION: ICD-10-CM

## 2019-05-20 RX ORDER — CLONAZEPAM 0.25 MG/1
0.25 TABLET, ORALLY DISINTEGRATING ORAL 2 TIMES DAILY PRN
Qty: 60 TABLET | Refills: 0 | Status: SHIPPED | OUTPATIENT
Start: 2019-05-20 | End: 2019-06-17 | Stop reason: SDUPTHER

## 2019-06-10 ENCOUNTER — OFFICE VISIT (OUTPATIENT)
Dept: FAMILY MEDICINE CLINIC | Age: 59
End: 2019-06-10
Payer: COMMERCIAL

## 2019-06-10 VITALS
BODY MASS INDEX: 20.63 KG/M2 | SYSTOLIC BLOOD PRESSURE: 126 MMHG | OXYGEN SATURATION: 98 % | HEART RATE: 68 BPM | DIASTOLIC BLOOD PRESSURE: 89 MMHG | WEIGHT: 124 LBS

## 2019-06-10 DIAGNOSIS — R51.9 CHRONIC INTRACTABLE HEADACHE, UNSPECIFIED HEADACHE TYPE: ICD-10-CM

## 2019-06-10 DIAGNOSIS — B02.9 HERPES ZOSTER WITHOUT COMPLICATION: ICD-10-CM

## 2019-06-10 DIAGNOSIS — I10 ESSENTIAL HYPERTENSION: Primary | ICD-10-CM

## 2019-06-10 DIAGNOSIS — G89.29 CHRONIC INTRACTABLE HEADACHE, UNSPECIFIED HEADACHE TYPE: ICD-10-CM

## 2019-06-10 PROCEDURE — 99214 OFFICE O/P EST MOD 30 MIN: CPT | Performed by: FAMILY MEDICINE

## 2019-06-10 RX ORDER — SUMATRIPTAN 100 MG/1
100 TABLET, FILM COATED ORAL
Qty: 27 TABLET | Refills: 1 | Status: SHIPPED | OUTPATIENT
Start: 2019-06-10 | End: 2019-09-16 | Stop reason: SDUPTHER

## 2019-06-10 RX ORDER — AMITRIPTYLINE HYDROCHLORIDE 50 MG/1
50 TABLET, FILM COATED ORAL NIGHTLY
Qty: 90 TABLET | Refills: 1 | Status: SHIPPED | OUTPATIENT
Start: 2019-06-10 | End: 2019-12-01 | Stop reason: SDUPTHER

## 2019-06-10 RX ORDER — TRAMADOL HYDROCHLORIDE 50 MG/1
50 TABLET ORAL EVERY 4 HOURS PRN
Qty: 18 TABLET | Refills: 0 | Status: SHIPPED | OUTPATIENT
Start: 2019-06-10 | End: 2019-06-13

## 2019-06-10 NOTE — PROGRESS NOTES
General FM note    Howard Neumann is a 61 y.o. female who presents today for follow up on her  medical conditions as noted below. Howard Neumann is c/o of   Chief Complaint   Patient presents with    3 Month Follow-Up       Patient Active Problem List:     Intractable chronic migraine without aura and without status migrainosus     Essential hypertension     Past Medical History:   Diagnosis Date    Acid reflux     Anemia     Hypertension     Migraine     Mitral valve prolapse     Osteoarthritis       Past Surgical History:   Procedure Laterality Date     SECTION      COLONOSCOPY      COLONOSCOPY  2018    Screening; with 1 biopsy    COLONOSCOPY N/A 2018    COLONOSCOPY WITH BIOPSY performed by Karina Marvin MD at [address] [place] [address] Ave, COLON, DIAGNOSTIC      OTHER SURGICAL HISTORY      uterine ablation    OVARY REMOVAL      TOE SURGERY       Family History   Problem Relation Age of Onset    Neuropathy Mother     Heart Attack Father      Current Outpatient Medications   Medication Sig Dispense Refill    amitriptyline (ELAVIL) 50 MG tablet Take 1 tablet by mouth nightly 90 tablet 1    SUMAtriptan (IMITREX) 100 MG tablet Take 1 tablet by mouth once as needed for Migraine 27 tablet 1    traMADol (ULTRAM) 50 MG tablet Take 1 tablet by mouth every 4 hours as needed for Pain for up to 3 days. Intended supply: 3 days. Take lowest dose possible to manage pain 18 tablet 0    clonazePAM (KLONOPIN) 0.25 MG disintegrating tablet Take 1 tablet by mouth 2 times daily as needed for Anxiety for up to 30 days.  60 tablet 0    baclofen (LIORESAL) 10 MG tablet Take 1 tablet by mouth 3 times daily 270 tablet 1    losartan (COZAAR) 100 MG tablet Take 1 tablet by mouth daily 30 tablet 5    OYSCO 500 500 MG TABS tablet TAKE 1 TABLET BY MOUTH EVERY DAY 90 tablet 1    guaiFENesin (MUCINEX) 600 MG extended release tablet Take 1 tablet by mouth 2 times daily 120 tablet 5    fluticasone (FLONASE) 50 MCG/ACT nasal spray 2 sprays by Nasal route daily 1 Bottle 11    SUMAtriptan (IMITREX) 100 MG tablet Take 1 tablet by mouth once as needed for Migraine 27 tablet 1    spironolactone (ALDACTONE) 50 MG tablet Take 1 tablet by mouth daily 90 tablet 1    pravastatin (PRAVACHOL) 40 MG tablet Take 1 tablet by mouth daily 90 tablet 1    calcium carbonate (OSCAL) 500 MG TABS tablet Take 1 tablet by mouth daily 90 tablet 1    butalbital-acetaminophen-caffeine (FIORICET, ESGIC) -40 MG per tablet Take 1 tablet by mouth every 6 hours as needed for Headaches or Migraine 30 tablet 1    rizatriptan (MAXALT) 10 MG tablet Take 1 tablet by mouth once as needed for Migraine May repeat in 2 hours if needed 30 tablet 3    promethazine (PHENERGAN) 25 MG tablet Take 1 tablet by mouth every 8 hours as needed for Nausea 30 tablet 0    Magnesium Oxide 400 MG CAPS Take 400 mg by mouth      acetaminophen (TYLENOL) 500 MG tablet Take 500 mg by mouth 2 times daily Indications: 2 PO BID       No current facility-administered medications for this visit. ALLERGIES:    Allergies   Allergen Reactions    Percocet [Oxycodone-Acetaminophen] Hives       Social History     Tobacco Use    Smoking status: Never Smoker    Smokeless tobacco: Never Used   Substance Use Topics    Alcohol use: No      Body mass index is 20.63 kg/m². /89   Pulse 68   Wt 124 lb (56.2 kg)   SpO2 98%   BMI 20.63 kg/m²     Subjective:      HPI    60 yo female here for a FU of BP. Had shingles end of may 2019. Patient tells me that she had a beginning she has had some discomfort soreness and then she had an outbreak of blisters. She did go to urgent care who started her on a antiviral medication. The patient tells me that she did not get the shingles vaccination yet. She also tells me that she had a lot of stress recently. Her nephew committed suicide in his 25s.   There is also her granddaughter age of 1 I think had a surgery on months (around 9/10/2019), or if symptoms worsen or fail to improve, for Anxiety, HTN. No orders of the defined types were placed in this encounter. Orders Placed This Encounter   Medications    amitriptyline (ELAVIL) 50 MG tablet     Sig: Take 1 tablet by mouth nightly     Dispense:  90 tablet     Refill:  1    SUMAtriptan (IMITREX) 100 MG tablet     Sig: Take 1 tablet by mouth once as needed for Migraine     Dispense:  27 tablet     Refill:  1    traMADol (ULTRAM) 50 MG tablet     Sig: Take 1 tablet by mouth every 4 hours as needed for Pain for up to 3 days. Intended supply: 3 days. Take lowest dose possible to manage pain     Dispense:  18 tablet     Refill:  0     Reduce doses taken as pain becomes manageable       Nicanor Martin received counseling on the following healthy behaviors: nutrition, exercise and medication adherence  Reviewed prior labs and health maintenance  Continue current medications, diet and exercise. Discussed use, benefit, and side effects of prescribed medications. Barriers to medication compliance addressed. Patient given educational materials - see patient instructions  Was a self-tracking handout given in paper form or via [employer]? No: .    Requested Prescriptions     Signed Prescriptions Disp Refills    amitriptyline (ELAVIL) 50 MG tablet 90 tablet 1     Sig: Take 1 tablet by mouth nightly    SUMAtriptan (IMITREX) 100 MG tablet 27 tablet 1     Sig: Take 1 tablet by mouth once as needed for Migraine    traMADol (ULTRAM) 50 MG tablet 18 tablet 0     Sig: Take 1 tablet by mouth every 4 hours as needed for Pain for up to 3 days. Intended supply: 3 days. Take lowest dose possible to manage pain       All patient questions answered. Patient voiced understanding. Quality Measures    Body mass index is 20.63 kg/m². Normal. Weight control planned discussed Healthy diet and regular exercise.     BP: 126/89 Blood pressure is normal. Treatment plan consists of No treatment change needed.     Lab Results   Component Value Date    LDLCHOLESTEROL 97 12/26/2018    (goal LDL reduction with dx if diabetes is 50% LDL reduction)      PHQ Scores 1/17/2019 9/17/2018 5/15/2017   PHQ2 Score 0 0 0   PHQ9 Score 0 0 0     Interpretation of Total Score Depression Severity: 1-4 = Minimal depression, 5-9 = Mild depression, 10-14 = Moderate depression, 15-19 = Moderately severe depression, 20-27 = Severe depression     Electronically signed by Beulah Ragland MD on 6/11/2019 at 5:30 AM       (Please note that portions of this note were completed with a voice recognition program. Efforts were made to edit the dictations but occasionally words are mis-transcribed.)

## 2019-06-25 DIAGNOSIS — I10 ESSENTIAL HYPERTENSION: ICD-10-CM

## 2019-06-25 RX ORDER — SPIRONOLACTONE 50 MG/1
TABLET, FILM COATED ORAL
Qty: 90 TABLET | Refills: 1 | Status: SHIPPED | OUTPATIENT
Start: 2019-06-25 | End: 2019-12-12 | Stop reason: SDUPTHER

## 2019-06-27 DIAGNOSIS — G89.29 CHRONIC INTRACTABLE HEADACHE, UNSPECIFIED HEADACHE TYPE: ICD-10-CM

## 2019-06-27 DIAGNOSIS — R51.9 CHRONIC INTRACTABLE HEADACHE, UNSPECIFIED HEADACHE TYPE: ICD-10-CM

## 2019-06-27 RX ORDER — CALCIUM CARBONATE 500(1250)
TABLET ORAL
Qty: 90 TABLET | Refills: 1 | Status: SHIPPED | OUTPATIENT
Start: 2019-06-27 | End: 2019-12-10 | Stop reason: SDUPTHER

## 2019-07-01 RX ORDER — PRAVASTATIN SODIUM 40 MG
TABLET ORAL
Qty: 90 TABLET | Refills: 1 | Status: SHIPPED | OUTPATIENT
Start: 2019-07-01 | End: 2019-12-10 | Stop reason: SDUPTHER

## 2019-07-11 RX ORDER — LOSARTAN POTASSIUM 100 MG/1
TABLET ORAL
Qty: 90 TABLET | Refills: 1 | Status: SHIPPED | OUTPATIENT
Start: 2019-07-11 | End: 2020-01-08

## 2019-07-17 DIAGNOSIS — I10 ESSENTIAL HYPERTENSION: ICD-10-CM

## 2019-07-19 RX ORDER — CLONAZEPAM 0.25 MG/1
0.25 TABLET, ORALLY DISINTEGRATING ORAL 2 TIMES DAILY PRN
Qty: 60 TABLET | Refills: 0 | Status: SHIPPED | OUTPATIENT
Start: 2019-07-19 | End: 2019-09-16 | Stop reason: SDUPTHER

## 2019-09-16 ENCOUNTER — OFFICE VISIT (OUTPATIENT)
Dept: FAMILY MEDICINE CLINIC | Age: 59
End: 2019-09-16
Payer: COMMERCIAL

## 2019-09-16 ENCOUNTER — HOSPITAL ENCOUNTER (OUTPATIENT)
Facility: CLINIC | Age: 59
Discharge: HOME | End: 2019-09-16
Payer: COMMERCIAL

## 2019-09-16 VITALS
BODY MASS INDEX: 20.14 KG/M2 | WEIGHT: 121 LBS | HEART RATE: 95 BPM | SYSTOLIC BLOOD PRESSURE: 129 MMHG | DIASTOLIC BLOOD PRESSURE: 89 MMHG

## 2019-09-16 DIAGNOSIS — K59.04 CHRONIC IDIOPATHIC CONSTIPATION: ICD-10-CM

## 2019-09-16 DIAGNOSIS — R51.9 CHRONIC INTRACTABLE HEADACHE, UNSPECIFIED HEADACHE TYPE: ICD-10-CM

## 2019-09-16 DIAGNOSIS — G89.29 CHRONIC INTRACTABLE HEADACHE, UNSPECIFIED HEADACHE TYPE: ICD-10-CM

## 2019-09-16 DIAGNOSIS — R10.9 ABDOMINAL DISCOMFORT: ICD-10-CM

## 2019-09-16 DIAGNOSIS — I10 ESSENTIAL HYPERTENSION: Primary | ICD-10-CM

## 2019-09-16 PROCEDURE — 83013 H PYLORI (C-13) BREATH: CPT

## 2019-09-16 PROCEDURE — 83014 H PYLORI DRUG ADMIN: CPT

## 2019-09-16 PROCEDURE — 99214 OFFICE O/P EST MOD 30 MIN: CPT | Performed by: FAMILY MEDICINE

## 2019-09-16 RX ORDER — TOPIRAMATE 25 MG/1
25 TABLET ORAL 2 TIMES DAILY
Qty: 60 TABLET | Refills: 3 | Status: SHIPPED | OUTPATIENT
Start: 2019-09-16 | End: 2020-01-07

## 2019-09-16 RX ORDER — SUMATRIPTAN 100 MG/1
100 TABLET, FILM COATED ORAL
Qty: 27 TABLET | Refills: 1 | Status: SHIPPED | OUTPATIENT
Start: 2019-09-16 | End: 2020-01-02 | Stop reason: SDUPTHER

## 2019-09-16 RX ORDER — CLONAZEPAM 0.25 MG/1
0.25 TABLET, ORALLY DISINTEGRATING ORAL NIGHTLY PRN
Qty: 30 TABLET | Refills: 0 | Status: SHIPPED | OUTPATIENT
Start: 2019-09-16 | End: 2019-10-11 | Stop reason: SDUPTHER

## 2019-09-16 NOTE — PROGRESS NOTES
MG tablet       Plan:   Patient will continue current medication for blood pressure. She only wants to take clonazepam daily now. The patient will start a fiber supplement over-the-counter. We will order H. pylori test.  As she had it before. We will start the patient on Topamax for maintenance medication. Use Imitrex as needed. Call or return to clinic prn if these symptoms worsen or fail to improve as anticipated. I have reviewed the instructions with the patient, answering all questions to her satisfaction. Return in about 3 months (around 12/16/2019), or if symptoms worsen or fail to improve, for [address] [place] [address], [facility]. Orders Placed This Encounter   Procedures    H. Pylori Breath Test, Adult     Standing Status:   Future     Number of Occurrences:   1     Standing Expiration Date:   9/16/2020     Orders Placed This Encounter   Medications    clonazePAM (KLONOPIN) 0.25 MG disintegrating tablet     Sig: Take 1 tablet by mouth nightly as needed for Anxiety for up to 30 days. Dispense:  30 tablet     Refill:  0     Not to exceed 5 additional fills before 12/15/2019    SUMAtriptan (IMITREX) 100 MG tablet     Sig: Take 1 tablet by mouth once as needed for Migraine     Dispense:  27 tablet     Refill:  1    topiramate (TOPAMAX) 25 MG tablet     Sig: Take 1 tablet by mouth 2 times daily     Dispense:  60 tablet     Refill:  3       Clarisa received counseling on the following healthy behaviors: nutrition, exercise and medication adherence  Reviewed prior labs and health maintenance  Continue current medications, diet and exercise. Discussed use, benefit, and side effects of prescribed medications. Barriers to medication compliance addressed. Patient given educational materials - see patient instructions  Was a self-tracking handout given in paper form or via [employer]?  No: .    Requested Prescriptions     Signed Prescriptions Disp Refills    clonazePAM (KLONOPIN) 0.25 MG disintegrating tablet 30 tablet 0

## 2019-09-18 LAB — H PYLORI BREATH TEST: NEGATIVE

## 2019-09-20 ENCOUNTER — TELEPHONE (OUTPATIENT)
Dept: FAMILY MEDICINE CLINIC | Age: 59
End: 2019-09-20

## 2019-10-11 DIAGNOSIS — I10 ESSENTIAL HYPERTENSION: ICD-10-CM

## 2019-10-11 RX ORDER — CLONAZEPAM 0.25 MG/1
0.25 TABLET, ORALLY DISINTEGRATING ORAL NIGHTLY PRN
Qty: 30 TABLET | Refills: 0 | Status: SHIPPED | OUTPATIENT
Start: 2019-10-11 | End: 2019-11-12 | Stop reason: SDUPTHER

## 2019-11-24 DIAGNOSIS — G44.221 CHRONIC TENSION-TYPE HEADACHE, INTRACTABLE: ICD-10-CM

## 2019-11-25 RX ORDER — BACLOFEN 10 MG/1
TABLET ORAL
Qty: 270 TABLET | Refills: 1 | Status: SHIPPED | OUTPATIENT
Start: 2019-11-25 | End: 2020-12-18

## 2019-12-01 DIAGNOSIS — G89.29 CHRONIC INTRACTABLE HEADACHE, UNSPECIFIED HEADACHE TYPE: ICD-10-CM

## 2019-12-01 DIAGNOSIS — R51.9 CHRONIC INTRACTABLE HEADACHE, UNSPECIFIED HEADACHE TYPE: ICD-10-CM

## 2019-12-02 RX ORDER — AMITRIPTYLINE HYDROCHLORIDE 50 MG/1
TABLET, FILM COATED ORAL
Qty: 90 TABLET | Refills: 1 | Status: SHIPPED | OUTPATIENT
Start: 2019-12-02 | End: 2020-05-26

## 2019-12-16 ENCOUNTER — OFFICE VISIT (OUTPATIENT)
Dept: FAMILY MEDICINE CLINIC | Age: 59
End: 2019-12-16
Payer: COMMERCIAL

## 2019-12-16 VITALS
SYSTOLIC BLOOD PRESSURE: 139 MMHG | HEART RATE: 96 BPM | TEMPERATURE: 97.4 F | DIASTOLIC BLOOD PRESSURE: 93 MMHG | WEIGHT: 125 LBS | BODY MASS INDEX: 20.8 KG/M2 | OXYGEN SATURATION: 100 %

## 2019-12-16 DIAGNOSIS — G43.719 INTRACTABLE CHRONIC MIGRAINE WITHOUT AURA AND WITHOUT STATUS MIGRAINOSUS: ICD-10-CM

## 2019-12-16 DIAGNOSIS — Z00.01 ENCOUNTER FOR WELL ADULT EXAM WITH ABNORMAL FINDINGS: Primary | ICD-10-CM

## 2019-12-16 DIAGNOSIS — F43.9 STRESS AT HOME: ICD-10-CM

## 2019-12-16 DIAGNOSIS — Z12.39 BREAST CANCER SCREENING: ICD-10-CM

## 2019-12-16 DIAGNOSIS — I10 ESSENTIAL HYPERTENSION: ICD-10-CM

## 2019-12-16 PROCEDURE — 99396 PREV VISIT EST AGE 40-64: CPT | Performed by: FAMILY MEDICINE

## 2019-12-16 PROCEDURE — 99213 OFFICE O/P EST LOW 20 MIN: CPT | Performed by: FAMILY MEDICINE

## 2019-12-16 RX ORDER — CLONAZEPAM 0.25 MG/1
0.25 TABLET, ORALLY DISINTEGRATING ORAL NIGHTLY PRN
Qty: 30 TABLET | Refills: 0 | Status: SHIPPED | OUTPATIENT
Start: 2019-12-16 | End: 2019-12-17 | Stop reason: SDUPTHER

## 2019-12-16 RX ORDER — TRAMADOL HYDROCHLORIDE 50 MG/1
50 TABLET ORAL DAILY
Qty: 10 TABLET | Refills: 0 | Status: SHIPPED | OUTPATIENT
Start: 2019-12-16 | End: 2019-12-26

## 2019-12-16 RX ORDER — ONDANSETRON 4 MG/1
4 TABLET, ORALLY DISINTEGRATING ORAL 3 TIMES DAILY PRN
Qty: 21 TABLET | Refills: 0 | Status: SHIPPED | OUTPATIENT
Start: 2019-12-16 | End: 2021-02-01

## 2019-12-17 RX ORDER — CLONAZEPAM 0.25 MG/1
0.25 TABLET, ORALLY DISINTEGRATING ORAL 2 TIMES DAILY PRN
Qty: 60 TABLET | Refills: 0 | Status: SHIPPED | OUTPATIENT
Start: 2019-12-24 | End: 2020-03-16 | Stop reason: SDUPTHER

## 2019-12-30 ENCOUNTER — HOSPITAL ENCOUNTER (OUTPATIENT)
Facility: CLINIC | Age: 59
Discharge: HOME | End: 2019-12-30
Payer: COMMERCIAL

## 2019-12-30 DIAGNOSIS — Z00.01 ENCOUNTER FOR WELL ADULT EXAM WITH ABNORMAL FINDINGS: ICD-10-CM

## 2019-12-30 LAB
-: NORMAL
ABSOLUTE EOS #: 0.39 K/UL (ref 0–0.44)
ABSOLUTE IMMATURE GRANULOCYTE: <0.03 K/UL (ref 0–0.3)
ABSOLUTE LYMPH #: 2.16 K/UL (ref 1.1–3.7)
ABSOLUTE MONO #: 0.56 K/UL (ref 0.1–1.2)
ALBUMIN SERPL-MCNC: 4 G/DL (ref 3.5–5.2)
ALBUMIN/GLOBULIN RATIO: 1.3 (ref 1–2.5)
ALP BLD-CCNC: 93 U/L (ref 35–104)
ALT SERPL-CCNC: 36 U/L (ref 5–33)
AMORPHOUS: NORMAL
ANION GAP SERPL CALCULATED.3IONS-SCNC: 11 MMOL/L (ref 9–17)
AST SERPL-CCNC: 34 U/L
BACTERIA: NORMAL
BASOPHILS # BLD: 1 % (ref 0–2)
BASOPHILS ABSOLUTE: 0.04 K/UL (ref 0–0.2)
BILIRUB SERPL-MCNC: 0.4 MG/DL (ref 0.3–1.2)
BILIRUBIN URINE: NEGATIVE
BUN BLDV-MCNC: 15 MG/DL (ref 6–20)
BUN/CREAT BLD: ABNORMAL (ref 9–20)
CALCIUM SERPL-MCNC: 9.6 MG/DL (ref 8.6–10.4)
CASTS UA: NORMAL /LPF (ref 0–8)
CHLORIDE BLD-SCNC: 104 MMOL/L (ref 98–107)
CHOLESTEROL/HDL RATIO: 3.5
CHOLESTEROL: 164 MG/DL
CO2: 23 MMOL/L (ref 20–31)
COLOR: ABNORMAL
COMMENT UA: ABNORMAL
CREAT SERPL-MCNC: 0.85 MG/DL (ref 0.5–0.9)
CRYSTALS, UA: NORMAL /HPF
DIFFERENTIAL TYPE: ABNORMAL
EOSINOPHILS RELATIVE PERCENT: 7 % (ref 1–4)
EPITHELIAL CELLS UA: NORMAL /HPF (ref 0–5)
GFR AFRICAN AMERICAN: >60 ML/MIN
GFR NON-AFRICAN AMERICAN: >60 ML/MIN
GFR SERPL CREATININE-BSD FRML MDRD: ABNORMAL ML/MIN/{1.73_M2}
GFR SERPL CREATININE-BSD FRML MDRD: ABNORMAL ML/MIN/{1.73_M2}
GLUCOSE BLD-MCNC: 98 MG/DL (ref 70–99)
GLUCOSE URINE: NEGATIVE
HCT VFR BLD CALC: 50.1 % (ref 36.3–47.1)
HDLC SERPL-MCNC: 47 MG/DL
HEMOGLOBIN: 16.8 G/DL (ref 11.9–15.1)
IMMATURE GRANULOCYTES: 0 %
KETONES, URINE: NEGATIVE
LDL CHOLESTEROL: 99 MG/DL (ref 0–130)
LEUKOCYTE ESTERASE, URINE: NEGATIVE
LYMPHOCYTES # BLD: 41 % (ref 24–43)
MCH RBC QN AUTO: 31.2 PG (ref 25.2–33.5)
MCHC RBC AUTO-ENTMCNC: 33.5 G/DL (ref 28.4–34.8)
MCV RBC AUTO: 92.9 FL (ref 82.6–102.9)
MONOCYTES # BLD: 11 % (ref 3–12)
MUCUS: NORMAL
NITRITE, URINE: NEGATIVE
NRBC AUTOMATED: 0 PER 100 WBC
OTHER OBSERVATIONS UA: NORMAL
PDW BLD-RTO: 11.9 % (ref 11.8–14.4)
PH UA: 7.5 (ref 5–8)
PLATELET # BLD: 261 K/UL (ref 138–453)
PLATELET ESTIMATE: ABNORMAL
PMV BLD AUTO: 11.8 FL (ref 8.1–13.5)
POTASSIUM SERPL-SCNC: 4.2 MMOL/L (ref 3.7–5.3)
PROTEIN UA: NEGATIVE
RBC # BLD: 5.39 M/UL (ref 3.95–5.11)
RBC # BLD: ABNORMAL 10*6/UL
RBC UA: NORMAL /HPF (ref 0–4)
RENAL EPITHELIAL, UA: NORMAL /HPF
SEG NEUTROPHILS: 40 % (ref 36–65)
SEGMENTED NEUTROPHILS ABSOLUTE COUNT: 2.11 K/UL (ref 1.5–8.1)
SODIUM BLD-SCNC: 138 MMOL/L (ref 135–144)
SPECIFIC GRAVITY UA: 1.01 (ref 1–1.03)
TOTAL PROTEIN: 7.2 G/DL (ref 6.4–8.3)
TRICHOMONAS: NORMAL
TRIGL SERPL-MCNC: 92 MG/DL
TSH SERPL DL<=0.05 MIU/L-ACNC: 2.26 MIU/L (ref 0.3–5)
TURBIDITY: ABNORMAL
URINE HGB: NEGATIVE
UROBILINOGEN, URINE: NORMAL
VLDLC SERPL CALC-MCNC: NORMAL MG/DL (ref 1–30)
WBC # BLD: 5.3 K/UL (ref 3.5–11.3)
WBC # BLD: ABNORMAL 10*3/UL
WBC UA: NORMAL /HPF (ref 0–5)
YEAST: NORMAL

## 2019-12-30 PROCEDURE — 81001 URINALYSIS AUTO W/SCOPE: CPT

## 2019-12-30 PROCEDURE — 80053 COMPREHEN METABOLIC PANEL: CPT

## 2019-12-30 PROCEDURE — 80061 LIPID PANEL: CPT

## 2019-12-30 PROCEDURE — 84443 ASSAY THYROID STIM HORMONE: CPT

## 2019-12-30 PROCEDURE — 85025 COMPLETE CBC W/AUTO DIFF WBC: CPT

## 2019-12-30 PROCEDURE — 36415 COLL VENOUS BLD VENIPUNCTURE: CPT

## 2020-01-02 ENCOUNTER — OFFICE VISIT (OUTPATIENT)
Dept: FAMILY MEDICINE CLINIC | Age: 60
End: 2020-01-02
Payer: COMMERCIAL

## 2020-01-02 VITALS
SYSTOLIC BLOOD PRESSURE: 139 MMHG | BODY MASS INDEX: 20.8 KG/M2 | OXYGEN SATURATION: 100 % | HEART RATE: 82 BPM | DIASTOLIC BLOOD PRESSURE: 89 MMHG | WEIGHT: 125 LBS

## 2020-01-02 PROCEDURE — 99213 OFFICE O/P EST LOW 20 MIN: CPT | Performed by: FAMILY MEDICINE

## 2020-01-02 RX ORDER — OMEPRAZOLE 20 MG/1
20 CAPSULE, DELAYED RELEASE ORAL
Qty: 60 CAPSULE | Refills: 5 | Status: SHIPPED | OUTPATIENT
Start: 2020-01-02 | End: 2020-11-09

## 2020-01-02 NOTE — PROGRESS NOTES
Subjective:       Jose Elias Chu is an 61 y.o. female who presents for evaluation of heartburn. This has been associated with abdominal bloating, belching, difficulty swallowing and epigastric pain. She denies fullness after meals, hematemesis, odynophagia, regurgitation of undigested food, symptoms primarily relate to meals, and lying down after meals and upper abdominal discomfort. Symptoms have been present for several weeks. She denies dysphagia. She has not lost weight. She denies melena, hematochezia, hematemesis, and coffee ground emesis. Medical therapy in the past has included: none. Patient tells me that her son is in town from [facility]. She is quite excited about it. Patient's medications, allergies, past medical, surgical, social and family histories were reviewed and updated as appropriate. Review of Systems  Pertinent items are noted in HPI. Objective:       /89   Pulse 82   Wt 125 lb (56.7 kg)   SpO2 100%   BMI 20.80 kg/m²   General appearance: alert, appears stated age, cooperative and no distress  Lungs: clear to auscultation bilaterally  Heart: regular rate and rhythm, S1, S2 normal, no murmur, click, rub or gallop  Abdomen: soft, non-tender; bowel sounds normal; no masses,  no organomegaly and Somewhat bloated and epigastric discomfort present     Meg Tejeda was seen today for abdominal pain and gastroesophageal reflux. Diagnoses and all orders for this visit:    Gastroesophageal reflux disease, esophagitis presence not specified  -     omeprazole (PRILOSEC) 20 MG delayed release capsule; Take 1 capsule by mouth 2 times daily (before meals)    Patient will start the Prilosec 2 x 20 mg a day for 1 week and then she will decrease it to 1 tablet a day. She will continue to watch her diet. Even write it down. Call for any changes. If it does not go away follow-up with your GI.     Call or return to clinic prn if these symptoms worsen or fail to improve as

## 2020-01-02 NOTE — PATIENT INSTRUCTIONS
Smoking can make GERD worse. If you need help quitting, talk to your doctor about stop-smoking programs and medicines. These can increase your chances of quitting for good. · If you have GERD symptoms at night, raise the head of your bed 6 to 8 inches by putting the frame on blocks or placing a foam wedge under the head of your mattress. (Adding extra pillows does not work.)  · Do not wear tight clothing around your middle. · Lose weight if you need to. Losing just 5 to 10 pounds can help. When should you call for help? Call your doctor now or seek immediate medical care if:    · You have new or different belly pain.     · Your stools are black and tarlike or have streaks of blood.    Watch closely for changes in your health, and be sure to contact your doctor if:    · Your symptoms have not improved after 2 days.     · Food seems to catch in your throat or chest.   Where can you learn more? Go to [email or web address] org and sign in to your Powered account. Enter R253 in the ActionBase box to learn more about \"Gastroesophageal Reflux Disease (GERD): Care Instructions. \"     If you do not have an account, please click on the \"Sign Up Now\" link. Current as of: November 7, 2018  Content Version: 12.1  © 7848-5765 [employer]wise, Incorporated. Care instructions adapted under license by [facility] [address] If you have questions about a medical condition or this instruction, always ask your healthcare professional. [place] [address] any warranty or liability for your use of this information.

## 2020-01-07 RX ORDER — TOPIRAMATE 25 MG/1
TABLET ORAL
Qty: 180 TABLET | Refills: 1 | Status: SHIPPED | OUTPATIENT
Start: 2020-01-07 | End: 2020-09-22

## 2020-01-07 NOTE — TELEPHONE ENCOUNTER
Starlett Merlin is calling to request a refill on the following medication(s):  Requested Prescriptions     Pending Prescriptions Disp Refills    topiramate (TOPAMAX) 25 MG tablet [Pharmacy Med Name: TOPIRAMATE 25 MG TABLET] 180 tablet 1     Sig: TAKE 1 TABLET BY MOUTH TWICE A DAY       Last Visit Date (If Applicable):  4/7/6109    Next Visit Date:    3/16/2020

## 2020-01-08 RX ORDER — LOSARTAN POTASSIUM 100 MG/1
TABLET ORAL
Qty: 90 TABLET | Refills: 1 | Status: SHIPPED | OUTPATIENT
Start: 2020-01-08 | End: 2020-06-29

## 2020-01-30 RX ORDER — SPIRONOLACTONE 50 MG/1
TABLET, FILM COATED ORAL
Qty: 30 TABLET | Refills: 0 | Status: SHIPPED | OUTPATIENT
Start: 2020-01-30 | End: 2020-02-14 | Stop reason: SDUPTHER

## 2020-01-30 RX ORDER — CALCIUM CARBONATE 500(1250)
TABLET ORAL
Qty: 30 TABLET | Refills: 0 | Status: SHIPPED | OUTPATIENT
Start: 2020-01-30 | End: 2020-02-14 | Stop reason: SDUPTHER

## 2020-02-04 RX ORDER — PRAVASTATIN SODIUM 40 MG
40 TABLET ORAL DAILY
Qty: 30 TABLET | Refills: 3 | Status: SHIPPED | OUTPATIENT
Start: 2020-02-04 | End: 2020-02-14 | Stop reason: SDUPTHER

## 2020-02-14 RX ORDER — PRAVASTATIN SODIUM 40 MG
40 TABLET ORAL DAILY
Qty: 90 TABLET | Refills: 1 | Status: SHIPPED | OUTPATIENT
Start: 2020-02-14 | End: 2020-11-17

## 2020-02-14 RX ORDER — CALCIUM CARBONATE 500(1250)
500 TABLET ORAL DAILY
Qty: 90 TABLET | Refills: 1 | Status: SHIPPED | OUTPATIENT
Start: 2020-02-14 | End: 2020-08-07

## 2020-02-14 RX ORDER — SPIRONOLACTONE 50 MG/1
50 TABLET, FILM COATED ORAL DAILY
Qty: 90 TABLET | Refills: 1 | Status: SHIPPED | OUTPATIENT
Start: 2020-02-14 | End: 2020-08-18

## 2020-03-16 ENCOUNTER — OFFICE VISIT (OUTPATIENT)
Dept: FAMILY MEDICINE CLINIC | Age: 60
End: 2020-03-16
Payer: COMMERCIAL

## 2020-03-16 VITALS
SYSTOLIC BLOOD PRESSURE: 125 MMHG | WEIGHT: 126.2 LBS | OXYGEN SATURATION: 100 % | DIASTOLIC BLOOD PRESSURE: 86 MMHG | HEART RATE: 95 BPM | TEMPERATURE: 98.1 F | BODY MASS INDEX: 21.02 KG/M2 | HEIGHT: 65 IN

## 2020-03-16 PROCEDURE — 99213 OFFICE O/P EST LOW 20 MIN: CPT | Performed by: FAMILY MEDICINE

## 2020-03-16 RX ORDER — CLONAZEPAM 0.25 MG/1
0.25 TABLET, ORALLY DISINTEGRATING ORAL 2 TIMES DAILY PRN
Qty: 60 TABLET | Refills: 0 | Status: SHIPPED | OUTPATIENT
Start: 2020-03-16 | End: 2020-05-11 | Stop reason: SDUPTHER

## 2020-03-16 ASSESSMENT — PATIENT HEALTH QUESTIONNAIRE - PHQ9
1. LITTLE INTEREST OR PLEASURE IN DOING THINGS: 0
SUM OF ALL RESPONSES TO PHQ9 QUESTIONS 1 & 2: 0
SUM OF ALL RESPONSES TO PHQ QUESTIONS 1-9: 0
SUM OF ALL RESPONSES TO PHQ QUESTIONS 1-9: 0
2. FEELING DOWN, DEPRESSED OR HOPELESS: 0

## 2020-03-16 NOTE — PROGRESS NOTES
General FM note    Yenifer Weaver is a 61 y.o. female who presents today for follow up on her  medical conditions as noted below. Yenifer Weaver is c/o of   Chief Complaint   Patient presents with    3 Month Follow-Up       Patient Active Problem List:     Intractable chronic migraine without aura and without status migrainosus     Essential hypertension     Past Medical History:   Diagnosis Date    Acid reflux     Anemia     Hypertension     Migraine     Mitral valve prolapse     Osteoarthritis       Past Surgical History:   Procedure Laterality Date     SECTION      COLONOSCOPY      COLONOSCOPY  2018    Screening; with 1 biopsy    COLONOSCOPY N/A 2018    COLONOSCOPY WITH BIOPSY performed by Wong Shah MD at [address], COLON, DIAGNOSTIC      OTHER SURGICAL HISTORY      uterine ablation    OVARY REMOVAL      TOE SURGERY       Family History   Problem Relation Age of Onset    Neuropathy Mother     Heart Attack Father      Current Outpatient Medications   Medication Sig Dispense Refill    clonazePAM (KLONOPIN) 0.25 MG disintegrating tablet Take 1 tablet by mouth 2 times daily as needed for Anxiety for up to 30 days.  60 tablet 0    spironolactone (ALDACTONE) 50 MG tablet Take 1 tablet by mouth daily 90 tablet 1    calcium elemental (OYSCO 500) 500 MG TABS tablet Take 1 tablet by mouth daily 90 tablet 1    pravastatin (PRAVACHOL) 40 MG tablet Take 1 tablet by mouth daily 90 tablet 1    losartan (COZAAR) 100 MG tablet TAKE 1 TABLET BY MOUTH EVERY DAY 90 tablet 1    topiramate (TOPAMAX) 25 MG tablet TAKE 1 TABLET BY MOUTH TWICE A  tablet 1    omeprazole (PRILOSEC) 20 MG delayed release capsule Take 1 capsule by mouth 2 times daily (before meals) 60 capsule 5    ondansetron (ZOFRAN-ODT) 4 MG disintegrating tablet Take 1 tablet by mouth 3 times daily as needed for Nausea or Vomiting 21 tablet 0    amitriptyline (ELAVIL) 50 MG tablet TAKE 1 TABLET BY Requested Specialty:   Gastroenterology     Number of Visits Requested:   1     Orders Placed This Encounter   Medications    clonazePAM (KLONOPIN) 0.25 MG disintegrating tablet     Sig: Take 1 tablet by mouth 2 times daily as needed for Anxiety for up to 30 days. Dispense:  60 tablet     Refill:  0       Clarisa received counseling on the following healthy behaviors: nutrition, exercise and medication adherence  Reviewed prior labs and health maintenance  Continue current medications, diet and exercise. Discussed use, benefit, and side effects of prescribed medications. Barriers to medication compliance addressed. Patient given educational materials - see patient instructions  Was a self-tracking handout given in paper form or via [employer]? No: .    Requested Prescriptions     Signed Prescriptions Disp Refills    clonazePAM (KLONOPIN) 0.25 MG disintegrating tablet 60 tablet 0     Sig: Take 1 tablet by mouth 2 times daily as needed for Anxiety for up to 30 days. All patient questions answered. Patient voiced understanding. Quality Measures    Body mass index is 21 kg/m². Normal. Weight control planned discussed daily exercise regimen and Healthy diet and regular exercise. BP: 125/86 Blood pressure is normal. Treatment plan consists of No treatment change needed.     Lab Results   Component Value Date    LDLCHOLESTEROL 99 12/30/2019    (goal LDL reduction with dx if diabetes is 50% LDL reduction)      PHQ Scores 3/16/2020 1/17/2019 9/17/2018 5/15/2017   PHQ2 Score 0 0 0 0   PHQ9 Score 0 0 0 0     Interpretation of Total Score Depression Severity: 1-4 = Minimal depression, 5-9 = Mild depression, 10-14 = Moderate depression, 15-19 = Moderately severe depression, 20-27 = Severe depression     Electronically signed by Neville Tillman MD on 3/16/2020 at 11:15 AM       (Please note that portions of this note were completed with a voice recognition program. Efforts were made to edit the dictations but occasionally words are mis-transcribed.)

## 2020-05-11 RX ORDER — CLONAZEPAM 0.25 MG/1
0.25 TABLET, ORALLY DISINTEGRATING ORAL 2 TIMES DAILY PRN
Qty: 60 TABLET | Refills: 0 | Status: SHIPPED | OUTPATIENT
Start: 2020-05-11 | End: 2020-07-16 | Stop reason: SDUPTHER

## 2020-05-26 RX ORDER — SUMATRIPTAN 100 MG/1
100 TABLET, FILM COATED ORAL
Qty: 27 TABLET | Refills: 1 | Status: SHIPPED | OUTPATIENT
Start: 2020-05-26 | End: 2020-12-01 | Stop reason: SDUPTHER

## 2020-05-26 RX ORDER — AMITRIPTYLINE HYDROCHLORIDE 50 MG/1
TABLET, FILM COATED ORAL
Qty: 90 TABLET | Refills: 1 | Status: SHIPPED | OUTPATIENT
Start: 2020-05-26 | End: 2020-12-01 | Stop reason: SDUPTHER

## 2020-05-26 NOTE — TELEPHONE ENCOUNTER
Linda Hull is calling to request a refill on the following medication(s):    Last Visit Date (If Applicable):  3/39/4082    Next Visit Date:    5/24/2020    Medication Request:  Requested Prescriptions     Pending Prescriptions Disp Refills    SUMAtriptan (IMITREX) 100 MG tablet 27 tablet 1     Sig: Take 1 tablet by mouth once as needed for Migraine

## 2020-06-22 ENCOUNTER — OFFICE VISIT (OUTPATIENT)
Dept: FAMILY MEDICINE CLINIC | Age: 60
End: 2020-06-22
Payer: COMMERCIAL

## 2020-06-22 VITALS
HEART RATE: 94 BPM | DIASTOLIC BLOOD PRESSURE: 76 MMHG | OXYGEN SATURATION: 98 % | SYSTOLIC BLOOD PRESSURE: 119 MMHG | BODY MASS INDEX: 20.33 KG/M2 | WEIGHT: 122 LBS | HEIGHT: 65 IN | TEMPERATURE: 97.4 F

## 2020-06-22 PROCEDURE — 99214 OFFICE O/P EST MOD 30 MIN: CPT | Performed by: FAMILY MEDICINE

## 2020-06-22 NOTE — PATIENT INSTRUCTIONS
Patient Education        Gastroesophageal Reflux Disease (GERD): Care Instructions  Your Care Instructions     Gastroesophageal reflux disease (GERD) is the backward flow of stomach acid into the esophagus. The esophagus is the tube that leads from your throat to your stomach. A one-way valve prevents the stomach acid from backing up into this tube. When you have GERD, this valve does not close tightly enough. This can also cause pain and swelling in your esophagus (esophagitis). If you have mild GERD symptoms including heartburn, you may be able to control the problem with antacids or over-the-counter medicine. Changing your diet and eating habits, such as not eating late at night, losing weight, and making other lifestyle changes can also help reduce symptoms. Follow-up care is a key part of your treatment and safety. Be sure to make and go to all appointments, and call your doctor if you are having problems. It's also a good idea to know your test results and keep a list of the medicines you take. How can you care for yourself at home? · Take your medicines exactly as prescribed. Call your doctor if you think you are having a problem with your medicine. · Your doctor may recommend over-the-counter medicine. For mild or occasional indigestion, antacids, such as Tums, Gaviscon, Mylanta, or Maalox, may help. Your doctor also may recommend over-the-counter acid reducers, such as Pepcid AC (famotidine), Tagamet HB (cimetidine), or Prilosec (omeprazole). Read and follow all instructions on the label. If you use these medicines often, talk with your doctor. · Change your eating habits. ? It's best to eat several small meals instead of two or three large meals. ? After you eat, wait 2 to 3 hours before you lie down. ? Chocolate, mint, and alcohol can make GERD worse. ? Spicy foods, foods that have a lot of acid (like tomatoes and oranges), and coffee can make GERD symptoms worse in some people.  If your

## 2020-06-23 ASSESSMENT — PATIENT HEALTH QUESTIONNAIRE - PHQ9
SUM OF ALL RESPONSES TO PHQ QUESTIONS 1-9: 0
SUM OF ALL RESPONSES TO PHQ9 QUESTIONS 1 & 2: 0
2. FEELING DOWN, DEPRESSED OR HOPELESS: 0
SUM OF ALL RESPONSES TO PHQ QUESTIONS 1-9: 0
1. LITTLE INTEREST OR PLEASURE IN DOING THINGS: 0

## 2020-06-24 ENCOUNTER — TELEPHONE (OUTPATIENT)
Dept: FAMILY MEDICINE CLINIC | Age: 60
End: 2020-06-24

## 2020-06-24 NOTE — TELEPHONE ENCOUNTER
Patient calling because she fell by the water and hit her left side towards her back patient states she can't take deep breath and also is in pain, patient states she can't take ibuprofen because it messes her stomach up. Patient like advise what to do. She said she would a call back today.  Please advise

## 2020-06-29 RX ORDER — LOSARTAN POTASSIUM 100 MG/1
TABLET ORAL
Qty: 90 TABLET | Refills: 1 | Status: SHIPPED | OUTPATIENT
Start: 2020-06-29 | End: 2020-07-22 | Stop reason: SDUPTHER

## 2020-07-01 ENCOUNTER — TELEMEDICINE (OUTPATIENT)
Dept: FAMILY MEDICINE CLINIC | Age: 60
End: 2020-07-01
Payer: COMMERCIAL

## 2020-07-01 PROCEDURE — 99203 OFFICE O/P NEW LOW 30 MIN: CPT | Performed by: FAMILY MEDICINE

## 2020-07-01 RX ORDER — HYDROCODONE BITARTRATE AND ACETAMINOPHEN 10; 325 MG/1; MG/1
1 TABLET ORAL DAILY
Qty: 10 TABLET | Refills: 0 | Status: SHIPPED | OUTPATIENT
Start: 2020-07-01 | End: 2020-07-11

## 2020-07-01 NOTE — PROGRESS NOTES
General FM note    Fe Jasso is a 61 y.o. female who presents today for follow up on her  medical conditions as noted below. Fe Jasso is c/o of No chief complaint on file. Patient Active Problem List:     Intractable chronic migraine without aura and without status migrainosus     Essential hypertension     Past Medical History:   Diagnosis Date    Acid reflux     Anemia     Hypertension     Migraine     Mitral valve prolapse     Osteoarthritis       Past Surgical History:   Procedure Laterality Date     SECTION      COLONOSCOPY      COLONOSCOPY  2018    Screening; with 1 biopsy    COLONOSCOPY N/A 2018    COLONOSCOPY WITH BIOPSY performed by Celia Garcia [facility] at [facility], COLON, DIAGNOSTIC      OTHER SURGICAL HISTORY      uterine ablation    OVARY REMOVAL      TOE SURGERY       Family History   Problem Relation Age of Onset    Neuropathy Mother     Heart Attack Father      Current Outpatient Medications   Medication Sig Dispense Refill    HYDROcodone-acetaminophen (NORCO)  MG per tablet Take 1 tablet by mouth daily for 10 days. Intended supply: 10 days 10 tablet 0    losartan (COZAAR) 100 MG tablet TAKE 1 TABLET BY MOUTH EVERY DAY 90 tablet 1    Misc. Devices MISC 1 each by Does not apply route once for 1 dose Recommendation: 2 days off a week. 2 each 0    SUMAtriptan (IMITREX) 100 MG tablet Take 1 tablet by mouth once as needed for Migraine 27 tablet 1    amitriptyline (ELAVIL) 50 MG tablet TAKE 1 TABLET BY MOUTH EVERY DAY AT NIGHT 90 tablet 1    clonazePAM (KLONOPIN) 0.25 MG disintegrating tablet Take 1 tablet by mouth 2 times daily as needed for Anxiety for up to 30 days.  60 tablet 0    spironolactone (ALDACTONE) 50 MG tablet Take 1 tablet by mouth daily 90 tablet 1    calcium elemental (OYSCO 500) 500 MG TABS tablet Take 1 tablet by mouth daily 90 tablet 1    pravastatin (PRAVACHOL) 40 MG tablet Take 1 tablet by mouth daily 90 tablet 1    topiramate (TOPAMAX) 25 MG tablet TAKE 1 TABLET BY MOUTH TWICE A  tablet 1    omeprazole (PRILOSEC) 20 MG delayed release capsule Take 1 capsule by mouth 2 times daily (before meals) 60 capsule 5    ondansetron (ZOFRAN-ODT) 4 MG disintegrating tablet Take 1 tablet by mouth 3 times daily as needed for Nausea or Vomiting 21 tablet 0    baclofen (LIORESAL) 10 MG tablet TAKE 1 TABLET BY MOUTH THREE TIMES A  tablet 1    guaiFENesin (MUCINEX) 600 MG extended release tablet Take 1 tablet by mouth 2 times daily 120 tablet 5    fluticasone (FLONASE) 50 MCG/ACT nasal spray 2 sprays by Nasal route daily 1 Bottle 11    butalbital-acetaminophen-caffeine (FIORICET, ESGIC) -40 MG per tablet Take 1 tablet by mouth every 6 hours as needed for Headaches or Migraine 30 tablet 1    rizatriptan (MAXALT) 10 MG tablet Take 1 tablet by mouth once as needed for Migraine May repeat in 2 hours if needed 30 tablet 3    promethazine (PHENERGAN) 25 MG tablet Take 1 tablet by mouth every 8 hours as needed for Nausea 30 tablet 0    Magnesium Oxide 400 MG CAPS Take 400 mg by mouth       No current facility-administered medications for this visit. ALLERGIES:    Allergies   Allergen Reactions    Percocet [Oxycodone-Acetaminophen] Hives       Social History     Tobacco Use    Smoking status: Never Smoker    Smokeless tobacco: Never Used   Substance Use Topics    Alcohol use: No      There is no height or weight on file to calculate BMI. There were no vitals taken for this visit. Subjective:      HPI    62 yo female reaching out per tele med visit today. Did have a fall on a metal bar at her house  Just slipped. Was seen at the ED. Mobic 7.5 BID helps some, not able sleep. at night takes 2 x 5/325 norco which seems to help even in the morning. Pt just reaching out to make sure I know about this. \"Findings:     *   Lower lobe atelectasis. *  Upper lungs clear.   *  Heart size normal.  *  No pneumothorax. Sudha Eros may be fractures of the left lateral eighth and ninth ribs. Impression:    *  Developing lower lobe atelectasis.  This may be due to a shallow inspiration and rib injury. Workstation:[id number]    Finalized by Bertrand Moreland MD on 6/25/2020 2:34 PM\"    No other concerns. Review of Systems   Pertinent items are noted in HPI. Objective:   Physical Exam  General appearance: normal development, habitus and attention, no deformities. No distress. Pulmo: normal breathing pattern. Psychiatric: alert and oriented to place, time and person. Normal mood and affect. Assessment:       Diagnosis Orders   1. Closed fracture of multiple ribs of left side with routine healing, subsequent encounter  HYDROcodone-acetaminophen (NORCO)  MG per tablet       Plan:    Call or return to clinic prn if these symptoms worsen or fail to improve as anticipated. I have reviewed the instructions with the patient, answering all questions to her satisfaction. Tammy Wilson is a 61 y.o. female being evaluated by a Virtual Visit (video visit) encounter to address concerns as mentioned above. A caregiver was present when appropriate. Due to this being a TeleHealth encounter (During UCT-42 public health emergency), evaluation of the following organ systems was limited: Vitals/Constitutional/EENT/Resp/CV/GI//MS/Neuro/Skin/Heme-Lymph-Imm. Pursuant to the emergency declaration under the [facility], [address] [place] [address] authority and the [employer] and [employer] Act, this Virtual Visit was conducted with patient's (and/or legal guardian's) consent, to reduce the patient's risk of exposure to COVID-19 and provide necessary medical care. The patient (and/or legal guardian) has also been advised to contact this office for worsening conditions or problems, and seek emergency medical treatment and/or call 911 if deemed necessary. Patient identification was verified at the start of the visit: Yes    Total time spent for this encounter: Not billed by time    Services were provided through a video synchronous discussion virtually to substitute for in-person clinic visit. Patient and provider were located at their individual homes. --Alessandra Webb MD on 7/1/2020 at 10:21 AM    An electronic signature was used to authenticate this note. No follow-ups on file. No orders of the defined types were placed in this encounter. Orders Placed This Encounter   Medications    HYDROcodone-acetaminophen (NORCO)  MG per tablet     Sig: Take 1 tablet by mouth daily for 10 days.  Intended supply: 10 days     Dispense:  10 tablet     Refill:  0     Reduce doses taken as pain becomes manageable

## 2020-07-16 RX ORDER — CLONAZEPAM 0.25 MG/1
0.25 TABLET, ORALLY DISINTEGRATING ORAL 2 TIMES DAILY PRN
Qty: 60 TABLET | Refills: 0 | Status: SHIPPED | OUTPATIENT
Start: 2020-07-16 | End: 2020-09-18 | Stop reason: SDUPTHER

## 2020-07-16 NOTE — TELEPHONE ENCOUNTER
Last visit: 7/1/2020  Last Med refill: 6/10/2020  Does patient have enough medication for 72 hours: NA    Next Visit Date:  No future appointments.     Health Maintenance   Topic Date Due    HIV screen  05/25/1975    Cervical cancer screen  05/25/1981    Shingles Vaccine (1 of 2) 05/25/2010    DTaP/Tdap/Td vaccine (3 - Td) 07/26/2020    Flu vaccine (1) 09/01/2020    Lipid screen  12/30/2020    Potassium monitoring  12/30/2020    Creatinine monitoring  12/30/2020    Breast cancer screen  03/02/2022    Colon cancer screen colonoscopy  12/27/2028    Hepatitis C screen  Completed    Hepatitis A vaccine  Aged Out    Hepatitis B vaccine  Aged Out    Hib vaccine  Aged Out    Meningococcal (ACWY) vaccine  Aged Out    Pneumococcal 0-64 years Vaccine  Aged Out       No results found for: LABA1C          ( goal A1C is < 7)   No results found for: LABMICR  LDL Cholesterol (mg/dL)   Date Value   12/30/2019 99   12/26/2018 97       (goal LDL is <100)   AST (U/L)   Date Value   12/30/2019 34 (H)     ALT (U/L)   Date Value   12/30/2019 36 (H)     BUN (mg/dL)   Date Value   12/30/2019 15     BP Readings from Last 3 Encounters:   06/22/20 119/76   03/16/20 125/86   01/02/20 139/89          (goal 120/80)    All Future Testing planned in [facility]  Lab Frequency Next Occurrence               Patient Active Problem List:     Intractable chronic migraine without aura and without status migrainosus     Essential hypertension

## 2020-07-22 RX ORDER — LOSARTAN POTASSIUM 100 MG/1
100 TABLET ORAL DAILY
Qty: 90 TABLET | Refills: 1 | Status: SHIPPED | OUTPATIENT
Start: 2020-07-22 | End: 2021-01-15

## 2020-08-07 RX ORDER — CALCIUM CARBONATE 500(1250)
TABLET ORAL
Qty: 90 TABLET | Refills: 1 | Status: SHIPPED | OUTPATIENT
Start: 2020-08-07 | End: 2020-09-15 | Stop reason: SDUPTHER

## 2020-08-18 RX ORDER — SPIRONOLACTONE 50 MG/1
TABLET, FILM COATED ORAL
Qty: 90 TABLET | Refills: 1 | Status: SHIPPED | OUTPATIENT
Start: 2020-08-18 | End: 2021-02-08

## 2020-09-15 RX ORDER — CALCIUM CARBONATE 500(1250)
500 TABLET ORAL DAILY
Qty: 90 TABLET | Refills: 1 | Status: SHIPPED | OUTPATIENT
Start: 2020-09-15 | End: 2021-03-09

## 2020-09-18 RX ORDER — CLONAZEPAM 0.25 MG/1
0.25 TABLET, ORALLY DISINTEGRATING ORAL 2 TIMES DAILY PRN
Qty: 60 TABLET | Refills: 0 | Status: SHIPPED | OUTPATIENT
Start: 2020-09-18 | End: 2021-02-01

## 2020-09-22 RX ORDER — TOPIRAMATE 25 MG/1
TABLET ORAL
Qty: 180 TABLET | Refills: 2 | Status: SHIPPED | OUTPATIENT
Start: 2020-09-22 | End: 2022-06-20

## 2020-10-26 ENCOUNTER — OFFICE VISIT (OUTPATIENT)
Dept: FAMILY MEDICINE CLINIC | Age: 60
End: 2020-10-26
Payer: COMMERCIAL

## 2020-10-26 VITALS
DIASTOLIC BLOOD PRESSURE: 89 MMHG | TEMPERATURE: 97.2 F | WEIGHT: 125 LBS | SYSTOLIC BLOOD PRESSURE: 129 MMHG | OXYGEN SATURATION: 100 % | HEART RATE: 99 BPM | BODY MASS INDEX: 20.8 KG/M2

## 2020-10-26 PROCEDURE — 90471 IMMUNIZATION ADMIN: CPT | Performed by: FAMILY MEDICINE

## 2020-10-26 PROCEDURE — 99213 OFFICE O/P EST LOW 20 MIN: CPT | Performed by: FAMILY MEDICINE

## 2020-10-26 PROCEDURE — 90686 IIV4 VACC NO PRSV 0.5 ML IM: CPT | Performed by: FAMILY MEDICINE

## 2020-10-26 SDOH — ECONOMIC STABILITY: FOOD INSECURITY: WITHIN THE PAST 12 MONTHS, YOU WORRIED THAT YOUR FOOD WOULD RUN OUT BEFORE YOU GOT MONEY TO BUY MORE.: NEVER TRUE

## 2020-10-26 SDOH — ECONOMIC STABILITY: INCOME INSECURITY: HOW HARD IS IT FOR YOU TO PAY FOR THE VERY BASICS LIKE FOOD, HOUSING, MEDICAL CARE, AND HEATING?: NOT HARD AT ALL

## 2020-10-26 SDOH — ECONOMIC STABILITY: FOOD INSECURITY: WITHIN THE PAST 12 MONTHS, THE FOOD YOU BOUGHT JUST DIDN'T LAST AND YOU DIDN'T HAVE MONEY TO GET MORE.: NEVER TRUE

## 2020-10-26 NOTE — PROGRESS NOTES
Vaccine Information Sheet, \"Influenza - Inactivated\"  given to Bruna Quiroz, or parent/legal guardian of  Bruna Quiroz and verbalized understanding. Patient responses:    Have you ever had a reaction to a flu vaccine? No  Do you have any current illness? No  Have you ever had Guillian [place] Syndrome? No  Do you have a serious allergy to any of the following: Neomycin, Polymyxin, Thimerosal, eggs or egg products? No    Flu vaccine given per order. Please see immunization tab. Risks and benefits explained. Current VIS given.       Immunizations Administered     Name Date Dose Route    Influenza, Quadv, IM, PF (6 mo and older Fluzone, Flulaval, Fluarix, and 3 yrs and older Afluria) 10/26/2020 0.5 mL Intramuscular    Site: Deltoid- Left    Lot: [id number]    NDC: 62449-404-59

## 2020-10-26 NOTE — PROGRESS NOTES
General FM note    Bacilio Angulo is a 61 y.o. female who presents today for follow up on her  medical conditions as noted below.   Bacilio Angulo is c/o of   Chief Complaint   Patient presents with    3 Month Follow-Up       Patient Active Problem List:     Intractable chronic migraine without aura and without status migrainosus     Essential hypertension     Past Medical History:   Diagnosis Date    Acid reflux     Anemia     Hypertension     Migraine     Mitral valve prolapse     Osteoarthritis       Past Surgical History:   Procedure Laterality Date     SECTION      COLONOSCOPY      COLONOSCOPY  2018    Screening; with 1 biopsy    COLONOSCOPY N/A 2018    COLONOSCOPY WITH BIOPSY performed by Kendall Ochoa MD at [address], COLON, DIAGNOSTIC      OTHER SURGICAL HISTORY      uterine ablation    OVARY REMOVAL      TOE SURGERY       Family History   Problem Relation Age of Onset    Neuropathy Mother     Heart Attack Father      Current Outpatient Medications   Medication Sig Dispense Refill    ASPIRIN 81 PO Take by mouth      topiramate (TOPAMAX) 25 MG tablet TAKE 1 TABLET BY MOUTH TWICE A  tablet 2    calcium elemental (OYSCO 500) 500 MG TABS tablet Take 1 tablet by mouth daily 90 tablet 1    spironolactone (ALDACTONE) 50 MG tablet TAKE 1 TABLET BY MOUTH EVERY DAY 90 tablet 1    losartan (COZAAR) 100 MG tablet Take 1 tablet by mouth daily 90 tablet 1    amitriptyline (ELAVIL) 50 MG tablet TAKE 1 TABLET BY MOUTH EVERY DAY AT NIGHT 90 tablet 1    pravastatin (PRAVACHOL) 40 MG tablet Take 1 tablet by mouth daily 90 tablet 1    omeprazole (PRILOSEC) 20 MG delayed release capsule Take 1 capsule by mouth 2 times daily (before meals) 60 capsule 5    ondansetron (ZOFRAN-ODT) 4 MG disintegrating tablet Take 1 tablet by mouth 3 times daily as needed for Nausea or Vomiting 21 tablet 0    baclofen (LIORESAL) 10 MG tablet TAKE 1 TABLET BY MOUTH THREE TIMES A  tablet 1    guaiFENesin (MUCINEX) 600 MG extended release tablet Take 1 tablet by mouth 2 times daily 120 tablet 5    fluticasone (FLONASE) 50 MCG/ACT nasal spray 2 sprays by Nasal route daily 1 Bottle 11    butalbital-acetaminophen-caffeine (FIORICET, ESGIC) -40 MG per tablet Take 1 tablet by mouth every 6 hours as needed for Headaches or Migraine 30 tablet 1    promethazine (PHENERGAN) 25 MG tablet Take 1 tablet by mouth every 8 hours as needed for Nausea 30 tablet 0    Magnesium Oxide 400 MG CAPS Take 400 mg by mouth      clonazePAM (KLONOPIN) 0.25 MG disintegrating tablet Take 1 tablet by mouth 2 times daily as needed for Anxiety for up to 30 days. 60 tablet 0    Misc. Devices MISC 1 each by Does not apply route once for 1 dose Recommendation: 2 days off a week. 2 each 0    SUMAtriptan (IMITREX) 100 MG tablet Take 1 tablet by mouth once as needed for Migraine 27 tablet 1    rizatriptan (MAXALT) 10 MG tablet Take 1 tablet by mouth once as needed for Migraine May repeat in 2 hours if needed 30 tablet 3     No current facility-administered medications for this visit. ALLERGIES:    Allergies   Allergen Reactions    Percocet [Oxycodone-Acetaminophen] Hives       Social History     Tobacco Use    Smoking status: Never Smoker    Smokeless tobacco: Never Used   Substance Use Topics    Alcohol use: No      Body mass index is 20.8 kg/m². /89   Pulse 99   Temp 97.2 °F (36.2 °C)   Wt 125 lb (56.7 kg)   SpO2 100%   BMI 20.80 kg/m²     Subjective:      HPI    60 yo female coming today for blood pressure check. Her blood pressure looks very good today. She states that she has been taking clonazepam on and off. She does not know if she needs it anymore. She has been taking other medication as prescribed. She tells me that she did have finally R toe surgery on 10/06/2020 --tells me that she did have pain when running.   She states that she feels that about her running schedule her types were placed in this encounter. Call or return to clinic prn if these symptoms worsen or fail to improve as anticipated. I have reviewed the instructions with the patient, answering all questions to her satisfaction. Isaac Lantigua received counseling on the following healthy behaviors: nutrition, exercise and medication adherence  Reviewed prior labs and health maintenance. Continue current medications, diet and exercise. Discussed use, benefit, and side effects of prescribed medications. Barriers to medication compliance addressed. Patient given educational materials - see patient instructions. All patient questions answered. Patient voiced understanding.       Electronically signed by Erika Eastman MD on 10/27/2020 at 6:06 AM       (Please note that portions of this note were completed with a voice recognition program. Efforts were made to edit the dictations but occasionally words are mis-transcribed.)

## 2020-10-27 ASSESSMENT — PATIENT HEALTH QUESTIONNAIRE - PHQ9
SUM OF ALL RESPONSES TO PHQ QUESTIONS 1-9: 0
2. FEELING DOWN, DEPRESSED OR HOPELESS: 0
SUM OF ALL RESPONSES TO PHQ QUESTIONS 1-9: 0
SUM OF ALL RESPONSES TO PHQ9 QUESTIONS 1 & 2: 0
1. LITTLE INTEREST OR PLEASURE IN DOING THINGS: 0
SUM OF ALL RESPONSES TO PHQ QUESTIONS 1-9: 0

## 2020-11-09 RX ORDER — OMEPRAZOLE 20 MG/1
20 CAPSULE, DELAYED RELEASE ORAL
Qty: 180 CAPSULE | Refills: 1 | Status: SHIPPED | OUTPATIENT
Start: 2020-11-09 | End: 2021-08-02

## 2020-11-09 NOTE — TELEPHONE ENCOUNTER
Ana Maria Heller is calling to request a refill on the following medication(s):    Last Visit Date (If Applicable):  66/74/6452    Next Visit Date:    2/1/2021    Medication Request:  Requested Prescriptions     Pending Prescriptions Disp Refills    omeprazole (PRILOSEC) 20 MG delayed release capsule [Pharmacy Med Name: OMEPRAZOLE DR 20 MG CAPSULE] 180 capsule 1     Sig: TAKE 1 CAPSULE BY MOUTH 2 TIMES DAILY (BEFORE MEALS)

## 2020-11-17 RX ORDER — PRAVASTATIN SODIUM 40 MG
TABLET ORAL
Qty: 90 TABLET | Refills: 1 | Status: SHIPPED | OUTPATIENT
Start: 2020-11-17 | End: 2021-05-17

## 2020-11-17 NOTE — TELEPHONE ENCOUNTER
Nolan Giles is calling to request a refill on the following medication(s):    Last Visit Date (If Applicable):  37/12/6604    Next Visit Date:    2/1/2021    Medication Request:  Requested Prescriptions     Pending Prescriptions Disp Refills    pravastatin (PRAVACHOL) 40 MG tablet [Pharmacy Med Name: PRAVASTATIN SODIUM 40 MG TAB] 90 tablet 1     Sig: TAKE 1 TABLET BY MOUTH EVERY DAY

## 2020-12-01 RX ORDER — AMITRIPTYLINE HYDROCHLORIDE 50 MG/1
50 TABLET, FILM COATED ORAL NIGHTLY
Qty: 90 TABLET | Refills: 1 | Status: SHIPPED | OUTPATIENT
Start: 2020-12-01 | End: 2021-02-01

## 2020-12-01 NOTE — TELEPHONE ENCOUNTER
Julianne Muse is calling to request a refill on the following medication(s):    Last Visit Date (If Applicable):  54/39/2804    Next Visit Date:    2/1/2021    Medication Request:  Requested Prescriptions     Pending Prescriptions Disp Refills    amitriptyline (ELAVIL) 50 MG tablet 90 tablet 1

## 2020-12-02 RX ORDER — SUMATRIPTAN 100 MG/1
100 TABLET, FILM COATED ORAL
Qty: 27 TABLET | Refills: 1 | Status: SHIPPED | OUTPATIENT
Start: 2020-12-02 | End: 2021-05-24

## 2020-12-02 NOTE — TELEPHONE ENCOUNTER
Priscilla Bhatt is calling to request a refill on the following medication(s):    Last Visit Date (If Applicable):  98/87/9815    Next Visit Date:    12/1/2020    Medication Request:  Requested Prescriptions     Pending Prescriptions Disp Refills    SUMAtriptan (IMITREX) 100 MG tablet 27 tablet 1     Sig: Take 1 tablet by mouth once as needed for Migraine

## 2020-12-18 RX ORDER — BACLOFEN 10 MG/1
TABLET ORAL
Qty: 270 TABLET | Refills: 1 | Status: SHIPPED | OUTPATIENT
Start: 2020-12-18 | End: 2021-02-01 | Stop reason: ALTCHOICE

## 2020-12-18 NOTE — TELEPHONE ENCOUNTER
Deana Sahu is calling to request a refill on the following medication(s):    Last Visit Date (If Applicable):  52/68/3039    Next Visit Date:    2/1/2021    Medication Request:  Requested Prescriptions     Pending Prescriptions Disp Refills    baclofen (LIORESAL) 10 MG tablet [Pharmacy Med Name: BACLOFEN 10 MG TABLET] 270 tablet 1     Sig: TAKE 1 TABLET BY MOUTH THREE TIMES A DAY

## 2021-01-15 RX ORDER — LOSARTAN POTASSIUM 100 MG/1
TABLET ORAL
Qty: 90 TABLET | Refills: 1 | Status: SHIPPED | OUTPATIENT
Start: 2021-01-15 | End: 2021-07-07

## 2021-01-15 NOTE — TELEPHONE ENCOUNTER
Sandeep Matt is calling to request a refill on the following medication(s):    Last Visit Date (If Applicable):  63/81/9677    Next Visit Date:    2/1/2021    Medication Request:  Requested Prescriptions     Pending Prescriptions Disp Refills    losartan (COZAAR) 100 MG tablet [Pharmacy Med Name: LOSARTAN POTASSIUM 100 MG TAB] 90 tablet 1     Sig: TAKE 1 TABLET BY MOUTH EVERY DAY

## 2021-02-01 ENCOUNTER — TELEPHONE (OUTPATIENT)
Dept: FAMILY MEDICINE CLINIC | Age: 61
End: 2021-02-01

## 2021-02-01 ENCOUNTER — OFFICE VISIT (OUTPATIENT)
Dept: FAMILY MEDICINE CLINIC | Age: 61
End: 2021-02-01
Payer: COMMERCIAL

## 2021-02-01 VITALS
HEART RATE: 69 BPM | OXYGEN SATURATION: 91 % | DIASTOLIC BLOOD PRESSURE: 87 MMHG | SYSTOLIC BLOOD PRESSURE: 139 MMHG | WEIGHT: 121.2 LBS | BODY MASS INDEX: 20.17 KG/M2 | TEMPERATURE: 98.1 F

## 2021-02-01 DIAGNOSIS — M54.2 NECK PAIN: ICD-10-CM

## 2021-02-01 DIAGNOSIS — I10 ESSENTIAL HYPERTENSION: Primary | ICD-10-CM

## 2021-02-01 PROCEDURE — 99213 OFFICE O/P EST LOW 20 MIN: CPT | Performed by: FAMILY MEDICINE

## 2021-02-01 RX ORDER — TIZANIDINE 2 MG/1
2 TABLET ORAL NIGHTLY
Qty: 30 TABLET | Refills: 1 | Status: SHIPPED | OUTPATIENT
Start: 2021-02-01 | End: 2021-02-23

## 2021-02-01 ASSESSMENT — PATIENT HEALTH QUESTIONNAIRE - PHQ9: 2. FEELING DOWN, DEPRESSED OR HOPELESS: 0

## 2021-02-08 DIAGNOSIS — I10 ESSENTIAL HYPERTENSION: ICD-10-CM

## 2021-02-08 RX ORDER — SPIRONOLACTONE 50 MG/1
TABLET, FILM COATED ORAL
Qty: 90 TABLET | Refills: 1 | Status: SHIPPED | OUTPATIENT
Start: 2021-02-08 | End: 2021-08-06

## 2021-02-08 NOTE — TELEPHONE ENCOUNTER
Suraj Barrientos is calling to request a refill on the following medication(s):    Last Visit Date (If Applicable):  1/9/7161    Next Visit Date:    Visit date not found    Medication Request:  Requested Prescriptions     Pending Prescriptions Disp Refills    spironolactone (ALDACTONE) 50 MG tablet [Pharmacy Med Name: SPIRONOLACTONE 50 MG TABLET] 90 tablet 1     Sig: TAKE 1 TABLET BY MOUTH EVERY DAY

## 2021-02-17 ENCOUNTER — TELEPHONE (OUTPATIENT)
Dept: FAMILY MEDICINE CLINIC | Age: 61
End: 2021-02-17

## 2021-02-17 RX ORDER — CARVEDILOL 6.25 MG/1
6.25 TABLET ORAL DAILY
Qty: 30 TABLET | Refills: 5 | Status: SHIPPED | OUTPATIENT
Start: 2021-02-17 | End: 2021-03-03 | Stop reason: SDUPTHER

## 2021-02-23 RX ORDER — TIZANIDINE 2 MG/1
TABLET ORAL
Qty: 30 TABLET | Refills: 1 | Status: SHIPPED | OUTPATIENT
Start: 2021-02-23 | End: 2021-03-23

## 2021-03-03 RX ORDER — CARVEDILOL 6.25 MG/1
6.25 TABLET ORAL DAILY
Qty: 30 TABLET | Refills: 5 | Status: SHIPPED | OUTPATIENT
Start: 2021-03-03 | End: 2022-06-20

## 2021-03-03 NOTE — TELEPHONE ENCOUNTER
Ileana Delcid is calling to request a refill on the following medication(s):    Last Visit Date (If Applicable):  9/7/1325    Next Visit Date:    Visit date not found    Medication Request:  Requested Prescriptions     Pending Prescriptions Disp Refills    carvedilol (COREG) 6.25 MG tablet 30 tablet 5     Sig: Take 1 tablet by mouth daily

## 2021-03-09 DIAGNOSIS — G89.29 CHRONIC INTRACTABLE HEADACHE, UNSPECIFIED HEADACHE TYPE: ICD-10-CM

## 2021-03-09 DIAGNOSIS — R51.9 CHRONIC INTRACTABLE HEADACHE, UNSPECIFIED HEADACHE TYPE: ICD-10-CM

## 2021-03-09 RX ORDER — CALCIUM CARBONATE 500(1250)
TABLET ORAL
Qty: 90 TABLET | Refills: 1 | Status: SHIPPED | OUTPATIENT
Start: 2021-03-09 | End: 2022-06-20

## 2021-03-09 NOTE — TELEPHONE ENCOUNTER
Ciarra Costello is calling to request a refill on the following medication(s):    Medication Request:  Requested Prescriptions     Pending Prescriptions Disp Refills    calcium elemental (OSCAL) 500 MG TABS tablet [Pharmacy Med Name: OYSTER SHELL CALCIUM 500 MG TB] 90 tablet 1     Sig: TAKE 1 TABLET BY MOUTH EVERY DAY       Last Visit Date (If Applicable):  4/7/8932    Next Visit Date:    Visit date not found

## 2021-03-23 RX ORDER — TIZANIDINE 2 MG/1
TABLET ORAL
Qty: 30 TABLET | Refills: 1 | Status: SHIPPED | OUTPATIENT
Start: 2021-03-23 | End: 2021-04-19

## 2021-04-19 RX ORDER — TIZANIDINE 2 MG/1
TABLET ORAL
Qty: 30 TABLET | Refills: 1 | Status: SHIPPED | OUTPATIENT
Start: 2021-04-19 | End: 2021-05-17

## 2021-05-17 RX ORDER — PRAVASTATIN SODIUM 40 MG
TABLET ORAL
Qty: 90 TABLET | Refills: 1 | Status: SHIPPED | OUTPATIENT
Start: 2021-05-17 | End: 2021-11-29

## 2021-05-17 RX ORDER — TIZANIDINE 2 MG/1
TABLET ORAL
Qty: 30 TABLET | Refills: 1 | Status: SHIPPED | OUTPATIENT
Start: 2021-05-17 | End: 2021-06-11

## 2021-05-17 NOTE — TELEPHONE ENCOUNTER
Dhruv Kolby is calling to request a refill on the following medication(s):    Last Visit Date (If Applicable):  5/7/0037    Next Visit Date:    8/2/2021    Medication Request:  Requested Prescriptions     Pending Prescriptions Disp Refills    pravastatin (PRAVACHOL) 40 MG tablet [Pharmacy Med Name: PRAVASTATIN SODIUM 40 MG TAB] 90 tablet 1     Sig: TAKE 1 TABLET BY MOUTH EVERY DAY    tiZANidine (Wong Setter) 2 MG tablet [Pharmacy Med Name: TIZANIDINE HCL 2 MG TABLET] 30 tablet 1     Sig: TAKE 1 TABLET BY MOUTH EVERY DAY AT NIGHT

## 2021-05-24 RX ORDER — SUMATRIPTAN 100 MG/1
100 TABLET, FILM COATED ORAL
Qty: 27 TABLET | Refills: 1 | Status: SHIPPED | OUTPATIENT
Start: 2021-05-24 | End: 2021-11-08

## 2021-06-11 RX ORDER — TIZANIDINE 2 MG/1
TABLET ORAL
Qty: 30 TABLET | Refills: 1 | Status: SHIPPED | OUTPATIENT
Start: 2021-06-11 | End: 2021-07-06

## 2021-06-11 NOTE — TELEPHONE ENCOUNTER
Dhruv Jarrett is calling to request a refill on the following medication(s):    Last Visit Date (If Applicable):  0/1/4013    Next Visit Date:    8/2/2021    Medication Request:  Requested Prescriptions     Pending Prescriptions Disp Refills    tiZANidine (ZANAFLEX) 2 MG tablet [Pharmacy Med Name: TIZANIDINE HCL 2 MG TABLET] 30 tablet 1     Sig: TAKE 1 TABLET BY MOUTH EVERY DAY AT NIGHT

## 2021-07-06 RX ORDER — TIZANIDINE 2 MG/1
TABLET ORAL
Qty: 30 TABLET | Refills: 1 | Status: SHIPPED | OUTPATIENT
Start: 2021-07-06 | End: 2021-08-02 | Stop reason: SDUPTHER

## 2021-07-06 NOTE — TELEPHONE ENCOUNTER
Ginna Jaimes is calling to request a refill on the following medication(s):    Last Visit Date (If Applicable):  6/3/3824    Next Visit Date:    8/2/2021    Medication Request:  Requested Prescriptions     Pending Prescriptions Disp Refills    tiZANidine (ZANAFLEX) 2 MG tablet [Pharmacy Med Name: TIZANIDINE HCL 2 MG TABLET] 30 tablet 1     Sig: TAKE 1 TABLET BY MOUTH EVERY DAY AT NIGHT

## 2021-07-07 RX ORDER — LOSARTAN POTASSIUM 100 MG/1
TABLET ORAL
Qty: 90 TABLET | Refills: 1 | Status: SHIPPED | OUTPATIENT
Start: 2021-07-07 | End: 2022-01-03

## 2021-07-07 NOTE — TELEPHONE ENCOUNTER
Pearlie Goldberg is calling to request a refill on the following medication(s):    Last Visit Date (If Applicable):  9/0/5937    Next Visit Date:    8/2/2021    Medication Request:  Requested Prescriptions     Pending Prescriptions Disp Refills    losartan (COZAAR) 100 MG tablet [Pharmacy Med Name: LOSARTAN POTASSIUM 100 MG TAB] 90 tablet 1     Sig: TAKE 1 TABLET BY MOUTH EVERY DAY

## 2021-08-02 ENCOUNTER — OFFICE VISIT (OUTPATIENT)
Dept: FAMILY MEDICINE CLINIC | Age: 61
End: 2021-08-02
Payer: COMMERCIAL

## 2021-08-02 VITALS
BODY MASS INDEX: 18.99 KG/M2 | WEIGHT: 114 LBS | DIASTOLIC BLOOD PRESSURE: 83 MMHG | HEIGHT: 65 IN | TEMPERATURE: 97.9 F | SYSTOLIC BLOOD PRESSURE: 135 MMHG | HEART RATE: 69 BPM | OXYGEN SATURATION: 94 %

## 2021-08-02 DIAGNOSIS — I10 ESSENTIAL HYPERTENSION: Primary | ICD-10-CM

## 2021-08-02 DIAGNOSIS — K21.9 GASTROESOPHAGEAL REFLUX DISEASE, UNSPECIFIED WHETHER ESOPHAGITIS PRESENT: ICD-10-CM

## 2021-08-02 PROCEDURE — 99214 OFFICE O/P EST MOD 30 MIN: CPT | Performed by: FAMILY MEDICINE

## 2021-08-02 RX ORDER — RABEPRAZOLE SODIUM 20 MG/1
20 TABLET, DELAYED RELEASE ORAL DAILY
Qty: 30 TABLET | Refills: 3 | Status: SHIPPED | OUTPATIENT
Start: 2021-08-02 | End: 2021-08-24

## 2021-08-02 RX ORDER — TIZANIDINE 2 MG/1
TABLET ORAL
Qty: 90 TABLET | Refills: 1 | Status: SHIPPED | OUTPATIENT
Start: 2021-08-02 | End: 2022-01-24

## 2021-08-02 ASSESSMENT — PATIENT HEALTH QUESTIONNAIRE - PHQ9
SUM OF ALL RESPONSES TO PHQ QUESTIONS 1-9: 0
1. LITTLE INTEREST OR PLEASURE IN DOING THINGS: 0
SUM OF ALL RESPONSES TO PHQ QUESTIONS 1-9: 0
SUM OF ALL RESPONSES TO PHQ9 QUESTIONS 1 & 2: 0
SUM OF ALL RESPONSES TO PHQ QUESTIONS 1-9: 0
2. FEELING DOWN, DEPRESSED OR HOPELESS: 0

## 2021-08-02 NOTE — PROGRESS NOTES
General FM note    Sukumar Ryan is a 64 y.o. female who presents today for follow up on her  medical conditions as noted below. Sukumar Ryan is c/o of   Chief Complaint   Patient presents with    6 Month Follow-Up       Patient Active Problem List:     Intractable chronic migraine without aura and without status migrainosus     Essential hypertension     Past Medical History:   Diagnosis Date    Acid reflux     Anemia     Hypertension     Migraine     Mitral valve prolapse     Osteoarthritis       Past Surgical History:   Procedure Laterality Date     SECTION      COLONOSCOPY      COLONOSCOPY  2018    Screening; with 1 biopsy    COLONOSCOPY N/A 2018    COLONOSCOPY WITH BIOPSY performed by Veronica Hemphill MD at [facility], COLON, DIAGNOSTIC      OTHER SURGICAL HISTORY      uterine ablation    OVARY REMOVAL      TOE SURGERY       Family History   Problem Relation Age of Onset    Neuropathy Mother     Heart Attack Father      Current Outpatient Medications   Medication Sig Dispense Refill    RABEprazole (ACIPHEX) 20 MG tablet Take 1 tablet by mouth daily 30 tablet 3    tiZANidine (ZANAFLEX) 2 MG tablet TAKE 1 TABLET BY MOUTH EVERY DAY AT NIGHT 90 tablet 1    losartan (COZAAR) 100 MG tablet TAKE 1 TABLET BY MOUTH EVERY DAY 90 tablet 1    pravastatin (PRAVACHOL) 40 MG tablet TAKE 1 TABLET BY MOUTH EVERY DAY 90 tablet 1    calcium elemental (OSCAL) 500 MG TABS tablet TAKE 1 TABLET BY MOUTH EVERY DAY 90 tablet 1    carvedilol (COREG) 6.25 MG tablet Take 1 tablet by mouth daily 30 tablet 5    spironolactone (ALDACTONE) 50 MG tablet TAKE 1 TABLET BY MOUTH EVERY DAY 90 tablet 1    topiramate (TOPAMAX) 25 MG tablet TAKE 1 TABLET BY MOUTH TWICE A  tablet 2    Magnesium Oxide 400 MG CAPS Take 400 mg by mouth      SUMAtriptan (IMITREX) 100 MG tablet TAKE 1 TABLET BY MOUTH ONCE AS NEEDED FOR MIGRAINE 27 tablet 1    Misc.  Devices MISC 1 each by Does not apply route once for 1 dose Recommendation: 2 days off a week. 2 each 0     No current facility-administered medications for this visit. ALLERGIES:    Allergies   Allergen Reactions    Percocet [Oxycodone-Acetaminophen] Hives       Social History     Tobacco Use    Smoking status: Never Smoker    Smokeless tobacco: Never Used   Substance Use Topics    Alcohol use: No      Body mass index is 18.97 kg/m². /83   Pulse 69   Temp 97.9 °F (36.6 °C)   Ht 5' 5\" (1.651 m)   Wt 114 lb (51.7 kg)   SpO2 94%   BMI 18.97 kg/m²     Subjective:      HPI    64 y.o. female here for a FU. Her blood pressure is quite well controlled. Patient states she has been taking medication as prescribed. She is not taking clonazepam.  She states she is doing well following. Her twin grandsons are going to school and hopefully the patient will have more time for herself. She also to start running again. And she has been doing quite well. But she states that just yesterday morning she picked up a very old sweeper which seems very heavy and since then she has been having pain at her right hip. She has been using the tizanidine for discomfort. Patient also states that she has been seen by gastroenterologist and she was diagnosed with a gastric emptying disorder. She states that she has been taking the PPI as prescribed but has not worked. She would like to use rabeprazole if able to. Review of Systems   Constitutional: Negative for fever and unexpected weight change. Pertinent items are noted in HPI. Objective:   Physical Exam  Constitutional: VS (see above). General appearance: normal development, habitus and attention, no deformities. No distress. Eyes: normal conjunctiva and lids. CAV: RRR, no RMG. No edema lower extremities. Pulmo: CTA bilateral, no CWR. Skin: no rashes, lesions or ulcers. Musculoskeletal: normal gait. Nails: no clubbing or cyanosis.   Psychiatric: alert and oriented to place, time and person. Normal mood and affect. Assessment:       Diagnosis Orders   1. Essential hypertension  TSH with Reflex    CBC Auto Differential    Comprehensive Metabolic Panel    Lipid Panel    Urinalysis   2. Gastroesophageal reflux disease, unspecified whether esophagitis present  TSH with Reflex    CBC Auto Differential    Comprehensive Metabolic Panel    Lipid Panel    Urinalysis       Plan: We will continue current care. Yearly blood work ordered. We will start the patient on new medication for her emptying disorder. Patient will continue to follow-up with her gastroenterologist.  Return in about 6 months (around 2/2/2022), or if symptoms worsen or fail to improve, for HTN. Orders Placed This Encounter   Procedures    TSH with Reflex     Standing Status:   Future     Standing Expiration Date:   8/3/2022    CBC Auto Differential     Standing Status:   Future     Standing Expiration Date:   8/2/2022    Comprehensive Metabolic Panel     Standing Status:   Future     Standing Expiration Date:   8/2/2022    Lipid Panel     Standing Status:   Future     Standing Expiration Date:   8/2/2022     Order Specific Question:   Is Patient Fasting?/# of Hours     Answer:   yes    Urinalysis     Standing Status:   Future     Standing Expiration Date:   8/2/2022     Order Specific Question:   SPECIFY(EX-CATH,MIDSTREAM,CYSTO,ETC)? Answer:   midstream     Orders Placed This Encounter   Medications    RABEprazole (ACIPHEX) 20 MG tablet     Sig: Take 1 tablet by mouth daily     Dispense:  30 tablet     Refill:  3    tiZANidine (ZANAFLEX) 2 MG tablet     Sig: TAKE 1 TABLET BY MOUTH EVERY DAY AT NIGHT     Dispense:  90 tablet     Refill:  1       Call or return to clinic prn if these symptoms worsen or fail to improve as anticipated. I have reviewed the instructions with the patient, answering all questions to patient's satisfaction.       Milka Garcia received counseling on the following healthy behaviors: nutrition, exercise, and medication adherence  Reviewed prior labs and health maintenance. Continue current medications, diet and exercise. Discussed use, benefit, and side effects of prescribed medications. Barriers to medication compliance addressed. Patient given educational materials - see patient instructions. All patient questions answered. Patient voiced understanding.       Electronically signed by Ernesto Fuentes MD on 8/3/2021 at 6:14 AM       (Please note that portions of this note were completed with a voice recognition program. Efforts were made to edit the dictations but occasionally words are mis-transcribed.)

## 2021-08-06 DIAGNOSIS — I10 ESSENTIAL HYPERTENSION: ICD-10-CM

## 2021-08-06 RX ORDER — SPIRONOLACTONE 50 MG/1
TABLET, FILM COATED ORAL
Qty: 90 TABLET | Refills: 1 | Status: SHIPPED | OUTPATIENT
Start: 2021-08-06 | End: 2022-01-30

## 2021-08-06 NOTE — TELEPHONE ENCOUNTER
Jac Grayson is calling to request a refill on the following medication(s):    Last Visit Date (If Applicable):  5/3/8692    Next Visit Date:    2/3/2022    Medication Request:  Requested Prescriptions     Pending Prescriptions Disp Refills    spironolactone (ALDACTONE) 50 MG tablet [Pharmacy Med Name: SPIRONOLACTONE 50 MG TABLET] 90 tablet 1     Sig: TAKE 1 TABLET BY MOUTH EVERY DAY

## 2021-08-13 ENCOUNTER — PATIENT MESSAGE (OUTPATIENT)
Dept: FAMILY MEDICINE CLINIC | Age: 61
End: 2021-08-13

## 2021-08-13 ENCOUNTER — HOSPITAL ENCOUNTER (OUTPATIENT)
Facility: CLINIC | Age: 61
Discharge: HOME | End: 2021-08-13
Payer: COMMERCIAL

## 2021-08-13 DIAGNOSIS — K21.9 GASTROESOPHAGEAL REFLUX DISEASE, UNSPECIFIED WHETHER ESOPHAGITIS PRESENT: ICD-10-CM

## 2021-08-13 DIAGNOSIS — I10 ESSENTIAL HYPERTENSION: ICD-10-CM

## 2021-08-13 LAB
-: NORMAL
ABSOLUTE EOS #: 0.56 K/UL (ref 0–0.44)
ABSOLUTE IMMATURE GRANULOCYTE: <0.03 K/UL (ref 0–0.3)
ABSOLUTE LYMPH #: 2.54 K/UL (ref 1.1–3.7)
ABSOLUTE MONO #: 0.6 K/UL (ref 0.1–1.2)
ALBUMIN SERPL-MCNC: 4.2 G/DL (ref 3.5–5.2)
ALBUMIN/GLOBULIN RATIO: 1.4 (ref 1–2.5)
ALP BLD-CCNC: 99 U/L (ref 35–104)
ALT SERPL-CCNC: 38 U/L (ref 5–33)
AMORPHOUS: NORMAL
ANION GAP SERPL CALCULATED.3IONS-SCNC: 10 MMOL/L (ref 9–17)
AST SERPL-CCNC: 30 U/L
BACTERIA: NORMAL
BASOPHILS # BLD: 1 % (ref 0–2)
BASOPHILS ABSOLUTE: 0.05 K/UL (ref 0–0.2)
BILIRUB SERPL-MCNC: 0.56 MG/DL (ref 0.3–1.2)
BILIRUBIN URINE: NEGATIVE
BUN BLDV-MCNC: 24 MG/DL (ref 8–23)
BUN/CREAT BLD: ABNORMAL (ref 9–20)
CALCIUM SERPL-MCNC: 10.2 MG/DL (ref 8.6–10.4)
CASTS UA: NORMAL /LPF (ref 0–8)
CHLORIDE BLD-SCNC: 99 MMOL/L (ref 98–107)
CHOLESTEROL/HDL RATIO: 3.3
CHOLESTEROL: 175 MG/DL
CO2: 28 MMOL/L (ref 20–31)
COLOR: YELLOW
COMMENT UA: ABNORMAL
CREAT SERPL-MCNC: 0.89 MG/DL (ref 0.5–0.9)
CRYSTALS, UA: NORMAL /HPF
DIFFERENTIAL TYPE: ABNORMAL
EOSINOPHILS RELATIVE PERCENT: 8 % (ref 1–4)
EPITHELIAL CELLS UA: NORMAL /HPF (ref 0–5)
GFR AFRICAN AMERICAN: >60 ML/MIN
GFR NON-AFRICAN AMERICAN: >60 ML/MIN
GFR SERPL CREATININE-BSD FRML MDRD: ABNORMAL ML/MIN/{1.73_M2}
GFR SERPL CREATININE-BSD FRML MDRD: ABNORMAL ML/MIN/{1.73_M2}
GLUCOSE BLD-MCNC: 68 MG/DL (ref 70–99)
GLUCOSE URINE: NEGATIVE
HCT VFR BLD CALC: 49 % (ref 36.3–47.1)
HDLC SERPL-MCNC: 53 MG/DL
HEMOGLOBIN: 16.1 G/DL (ref 11.9–15.1)
IMMATURE GRANULOCYTES: 0 %
KETONES, URINE: NEGATIVE
LDL CHOLESTEROL: 104 MG/DL (ref 0–130)
LEUKOCYTE ESTERASE, URINE: NEGATIVE
LYMPHOCYTES # BLD: 36 % (ref 24–43)
MCH RBC QN AUTO: 30.2 PG (ref 25.2–33.5)
MCHC RBC AUTO-ENTMCNC: 32.9 G/DL (ref 28.4–34.8)
MCV RBC AUTO: 91.9 FL (ref 82.6–102.9)
MONOCYTES # BLD: 8 % (ref 3–12)
MUCUS: NORMAL
NITRITE, URINE: NEGATIVE
NRBC AUTOMATED: 0 PER 100 WBC
OTHER OBSERVATIONS UA: NORMAL
PDW BLD-RTO: 12.7 % (ref 11.8–14.4)
PH UA: 7.5 (ref 5–8)
PLATELET # BLD: 291 K/UL (ref 138–453)
PLATELET ESTIMATE: ABNORMAL
PMV BLD AUTO: 11.5 FL (ref 8.1–13.5)
POTASSIUM SERPL-SCNC: 5.4 MMOL/L (ref 3.7–5.3)
PROTEIN UA: NEGATIVE
RBC # BLD: 5.33 M/UL (ref 3.95–5.11)
RBC # BLD: ABNORMAL 10*6/UL
RBC UA: NORMAL /HPF (ref 0–4)
RENAL EPITHELIAL, UA: NORMAL /HPF
SEG NEUTROPHILS: 47 % (ref 36–65)
SEGMENTED NEUTROPHILS ABSOLUTE COUNT: 3.36 K/UL (ref 1.5–8.1)
SODIUM BLD-SCNC: 137 MMOL/L (ref 135–144)
SPECIFIC GRAVITY UA: 1.02 (ref 1–1.03)
TOTAL PROTEIN: 7.3 G/DL (ref 6.4–8.3)
TRICHOMONAS: NORMAL
TRIGL SERPL-MCNC: 90 MG/DL
TSH SERPL DL<=0.05 MIU/L-ACNC: 1.67 MIU/L (ref 0.3–5)
TURBIDITY: ABNORMAL
URINE HGB: NEGATIVE
UROBILINOGEN, URINE: NORMAL
VLDLC SERPL CALC-MCNC: NORMAL MG/DL (ref 1–30)
WBC # BLD: 7.1 K/UL (ref 3.5–11.3)
WBC # BLD: ABNORMAL 10*3/UL
WBC UA: NORMAL /HPF (ref 0–5)
YEAST: NORMAL

## 2021-08-13 PROCEDURE — 85025 COMPLETE CBC W/AUTO DIFF WBC: CPT

## 2021-08-13 PROCEDURE — 80053 COMPREHEN METABOLIC PANEL: CPT

## 2021-08-13 PROCEDURE — 80061 LIPID PANEL: CPT

## 2021-08-13 PROCEDURE — 81001 URINALYSIS AUTO W/SCOPE: CPT

## 2021-08-13 PROCEDURE — 84443 ASSAY THYROID STIM HORMONE: CPT

## 2021-08-13 PROCEDURE — 36415 COLL VENOUS BLD VENIPUNCTURE: CPT

## 2021-08-16 NOTE — TELEPHONE ENCOUNTER
From: Julianne Muse  To: Monica Olson MD  Sent: 8/13/2021 5:33 PM EDT  Subject: Test Results Question    I was wondering if all the levels in the yellow were cause for concern on my CBC results. Especially the eosinophils.  Thanks, Julianne Muse

## 2021-08-24 RX ORDER — RABEPRAZOLE SODIUM 20 MG/1
TABLET, DELAYED RELEASE ORAL
Qty: 30 TABLET | Refills: 3 | Status: SHIPPED | OUTPATIENT
Start: 2021-08-24 | End: 2021-12-20

## 2021-08-24 NOTE — TELEPHONE ENCOUNTER
Conjuntivae and eyelids appear normal, Sclerae : White without injection Kizzy Serra is calling to request a refill on the following medication(s):    Last Visit Date (If Applicable):  9/3/3697    Next Visit Date:    2/3/2022    Medication Request:  Requested Prescriptions     Pending Prescriptions Disp Refills    RABEprazole (ACIPHEX) 20 MG tablet [Pharmacy Med Name: RABEPRAZOLE SOD DR 20 MG TAB] 30 tablet 3     Sig: TAKE 1 TABLET BY MOUTH EVERY DAY

## 2021-11-08 RX ORDER — SUMATRIPTAN 100 MG/1
100 TABLET, FILM COATED ORAL
Qty: 27 TABLET | Refills: 1 | Status: SHIPPED | OUTPATIENT
Start: 2021-11-08 | End: 2022-01-06

## 2021-11-08 NOTE — TELEPHONE ENCOUNTER
Gerard Valdes is calling to request a refill on the following medication(s):    Last Visit Date (If Applicable):  9/5/7226    Next Visit Date:    2/7/2022    Medication Request:  Requested Prescriptions     Pending Prescriptions Disp Refills    SUMAtriptan (IMITREX) 100 MG tablet [Pharmacy Med Name: SUMATRIPTAN SUCC 100 MG TABLET] 27 tablet 1     Sig: TAKE 1 TABLET BY MOUTH ONCE AS NEEDED FOR MIGRAINE

## 2021-11-29 RX ORDER — PRAVASTATIN SODIUM 40 MG
TABLET ORAL
Qty: 90 TABLET | Refills: 1 | Status: SHIPPED | OUTPATIENT
Start: 2021-11-29 | End: 2022-05-27

## 2021-12-20 RX ORDER — RABEPRAZOLE SODIUM 20 MG/1
TABLET, DELAYED RELEASE ORAL
Qty: 90 TABLET | Refills: 1 | Status: SHIPPED | OUTPATIENT
Start: 2021-12-20 | End: 2022-06-15

## 2021-12-20 NOTE — TELEPHONE ENCOUNTER
Lindalou Prader is calling to request a refill on the following medication(s):    Last Visit Date (If Applicable):  6/1/0056    Next Visit Date:    2/7/2022    Medication Request:  Requested Prescriptions     Pending Prescriptions Disp Refills    RABEprazole (ACIPHEX) 20 MG tablet [Pharmacy Med Name: RABEPRAZOLE SOD DR 20 MG TAB] 90 tablet 1     Sig: TAKE 1 TABLET BY MOUTH EVERY DAY

## 2022-01-03 RX ORDER — LOSARTAN POTASSIUM 100 MG/1
TABLET ORAL
Qty: 90 TABLET | Refills: 1 | Status: SHIPPED | OUTPATIENT
Start: 2022-01-03 | End: 2022-07-01

## 2022-01-03 NOTE — TELEPHONE ENCOUNTER
Re Schaefer is calling to request a refill on the following medication(s):    Last Visit Date (If Applicable):  0/1/9595    Next Visit Date:    2/7/2022    Medication Request:  Requested Prescriptions     Pending Prescriptions Disp Refills    losartan (COZAAR) 100 MG tablet [Pharmacy Med Name: LOSARTAN POTASSIUM 100 MG TAB] 90 tablet 1     Sig: TAKE 1 TABLET BY MOUTH EVERY DAY

## 2022-01-06 RX ORDER — SUMATRIPTAN 100 MG/1
100 TABLET, FILM COATED ORAL
Qty: 27 TABLET | Refills: 1 | Status: SHIPPED | OUTPATIENT
Start: 2022-01-06 | End: 2022-08-15

## 2022-01-06 NOTE — TELEPHONE ENCOUNTER
Elizabeth Becerril is calling to request a refill on the following medication(s):    Last Visit Date (If Applicable):  8/4/2560    Next Visit Date:    2/7/2022    Medication Request:  Requested Prescriptions     Pending Prescriptions Disp Refills    SUMAtriptan (IMITREX) 100 MG tablet [Pharmacy Med Name: SUMATRIPTAN SUCC 100 MG TABLET] 27 tablet 1     Sig: TAKE 1 TABLET BY MOUTH ONCE AS NEEDED FOR MIGRAINE

## 2022-01-24 RX ORDER — TIZANIDINE 2 MG/1
TABLET ORAL
Qty: 90 TABLET | Refills: 1 | Status: SHIPPED | OUTPATIENT
Start: 2022-01-24 | End: 2022-06-20

## 2022-01-24 NOTE — TELEPHONE ENCOUNTER
Enio Jones is calling to request a refill on the following medication(s):    Last Visit Date (If Applicable):  2/0/0319    Next Visit Date:    2/7/2022    Medication Request:  Requested Prescriptions     Pending Prescriptions Disp Refills    tiZANidine (ZANAFLEX) 2 MG tablet [Pharmacy Med Name: TIZANIDINE HCL 2 MG TABLET] 90 tablet 1     Sig: TAKE 1 TABLET BY MOUTH EVERY DAY AT NIGHT

## 2022-01-28 DIAGNOSIS — I10 ESSENTIAL HYPERTENSION: ICD-10-CM

## 2022-01-28 NOTE — TELEPHONE ENCOUNTER
Ana Morgan is calling to request a refill on the following medication(s):    Last Visit Date (If Applicable):  7/1/7488    Next Visit Date:    2/7/2022    Medication Request:  Requested Prescriptions     Pending Prescriptions Disp Refills    spironolactone (ALDACTONE) 50 MG tablet [Pharmacy Med Name: SPIRONOLACTONE 50 MG TABLET] 90 tablet 1     Sig: TAKE 1 TABLET BY MOUTH EVERY DAY

## 2022-01-30 RX ORDER — SPIRONOLACTONE 50 MG/1
TABLET, FILM COATED ORAL
Qty: 90 TABLET | Refills: 1 | Status: SHIPPED | OUTPATIENT
Start: 2022-01-30 | End: 2022-08-01

## 2022-02-07 ENCOUNTER — OFFICE VISIT (OUTPATIENT)
Dept: FAMILY MEDICINE CLINIC | Age: 62
End: 2022-02-07
Payer: COMMERCIAL

## 2022-02-07 VITALS
SYSTOLIC BLOOD PRESSURE: 138 MMHG | TEMPERATURE: 98.1 F | BODY MASS INDEX: 19.49 KG/M2 | HEIGHT: 65 IN | DIASTOLIC BLOOD PRESSURE: 84 MMHG | WEIGHT: 117 LBS | OXYGEN SATURATION: 100 % | HEART RATE: 72 BPM

## 2022-02-07 DIAGNOSIS — R11.10 REGURGITATION AND RECHEWING OF FOOD: ICD-10-CM

## 2022-02-07 DIAGNOSIS — R14.2 BELCHING SYMPTOM: Primary | ICD-10-CM

## 2022-02-07 PROCEDURE — 99213 OFFICE O/P EST LOW 20 MIN: CPT | Performed by: FAMILY MEDICINE

## 2022-02-07 SDOH — ECONOMIC STABILITY: FOOD INSECURITY: WITHIN THE PAST 12 MONTHS, YOU WORRIED THAT YOUR FOOD WOULD RUN OUT BEFORE YOU GOT MONEY TO BUY MORE.: NEVER TRUE

## 2022-02-07 SDOH — ECONOMIC STABILITY: FOOD INSECURITY: WITHIN THE PAST 12 MONTHS, THE FOOD YOU BOUGHT JUST DIDN'T LAST AND YOU DIDN'T HAVE MONEY TO GET MORE.: NEVER TRUE

## 2022-02-07 ASSESSMENT — PATIENT HEALTH QUESTIONNAIRE - PHQ9
SUM OF ALL RESPONSES TO PHQ9 QUESTIONS 1 & 2: 0
SUM OF ALL RESPONSES TO PHQ QUESTIONS 1-9: 0
1. LITTLE INTEREST OR PLEASURE IN DOING THINGS: 0
SUM OF ALL RESPONSES TO PHQ QUESTIONS 1-9: 0
2. FEELING DOWN, DEPRESSED OR HOPELESS: 0
SUM OF ALL RESPONSES TO PHQ QUESTIONS 1-9: 0
SUM OF ALL RESPONSES TO PHQ QUESTIONS 1-9: 0

## 2022-02-07 ASSESSMENT — SOCIAL DETERMINANTS OF HEALTH (SDOH): HOW HARD IS IT FOR YOU TO PAY FOR THE VERY BASICS LIKE FOOD, HOUSING, MEDICAL CARE, AND HEATING?: NOT HARD AT ALL

## 2022-02-07 NOTE — PROGRESS NOTES
General FM note    Mark Whatley is a 64 y.o. female who presents today for follow up on her  medical conditions as noted below. Mark Whatley is c/o of   Chief Complaint   Patient presents with    6 Month Follow-Up       Patient Active Problem List:     Intractable chronic migraine without aura and without status migrainosus     Essential hypertension     Past Medical History:   Diagnosis Date    Acid reflux     Anemia     Hypertension     Migraine     Mitral valve prolapse     Osteoarthritis       Past Surgical History:   Procedure Laterality Date     SECTION      COLONOSCOPY      COLONOSCOPY  2018    Screening; with 1 biopsy    COLONOSCOPY N/A 2018    COLONOSCOPY WITH BIOPSY performed by Eliza Orellana MD at [address], COLON, DIAGNOSTIC      OTHER SURGICAL HISTORY      uterine ablation    OVARY REMOVAL      TOE SURGERY       Family History   Problem Relation Age of Onset    Neuropathy Mother     Heart Attack Father      Current Outpatient Medications   Medication Sig Dispense Refill    spironolactone (ALDACTONE) 50 MG tablet TAKE 1 TABLET BY MOUTH EVERY DAY 90 tablet 1    tiZANidine (ZANAFLEX) 2 MG tablet TAKE 1 TABLET BY MOUTH EVERY DAY AT NIGHT 90 tablet 1    losartan (COZAAR) 100 MG tablet TAKE 1 TABLET BY MOUTH EVERY DAY 90 tablet 1    RABEprazole (ACIPHEX) 20 MG tablet TAKE 1 TABLET BY MOUTH EVERY DAY 90 tablet 1    pravastatin (PRAVACHOL) 40 MG tablet TAKE 1 TABLET BY MOUTH EVERY DAY 90 tablet 1    calcium elemental (OSCAL) 500 MG TABS tablet TAKE 1 TABLET BY MOUTH EVERY DAY 90 tablet 1    carvedilol (COREG) 6.25 MG tablet Take 1 tablet by mouth daily 30 tablet 5    topiramate (TOPAMAX) 25 MG tablet TAKE 1 TABLET BY MOUTH TWICE A  tablet 2    Magnesium Oxide 400 MG CAPS Take 400 mg by mouth      SUMAtriptan (IMITREX) 100 MG tablet TAKE 1 TABLET BY MOUTH ONCE AS NEEDED FOR MIGRAINE 27 tablet 1    Misc.  Devices MISC 1 each by Does not apply route once for 1 dose Recommendation: 2 days off a week. 2 each 0     No current facility-administered medications for this visit. ALLERGIES:    Allergies   Allergen Reactions    Percocet [Oxycodone-Acetaminophen] Hives       Social History     Tobacco Use    Smoking status: Never Smoker    Smokeless tobacco: Never Used   Substance Use Topics    Alcohol use: No      Body mass index is 19.47 kg/m². /84   Pulse 72   Temp 98.1 °F (36.7 °C)   Ht 5' 5\" (1.651 m)   Wt 117 lb (53.1 kg)   SpO2 100%   BMI 19.47 kg/m²     Subjective:      HPI    64 y.o. female coming today for follow-up. Overall she has been doing well. Blood pressure well controlled. But she states that she has been having belching all the time. She states that she regurgitates food progressively worse than previously. She feels that the AcipHex has helped. She does not have the feeling that there is something sitting in her throat. But then again overall she has all these GI problems. The patient also was started by an orthopedic NP on [place] for osteoporosis. Review of Systems   Constitutional: Negative for fever and unexpected weight change. Pertinent items are noted in HPI. Objective:   Physical Exam  Constitutional: VS (see above). General appearance: normal development, habitus and attention, no deformities. No distress. Eyes: normal conjunctiva and lids. CAV: RRR, no RMG. No edema lower extremities. Pulmo: CTA bilateral, no CWR. Skin: no rashes, lesions or ulcers. Musculoskeletal: normal gait. Nails: no clubbing or cyanosis. Psychiatric: alert and oriented to place, time and person. Normal mood and affect. Assessment:       Diagnosis Orders   1. Belching symptom     2. Regurgitation and rechewing of food  FL ESOPHAGRAM       Plan:   I discussed with patient will reach out to her gastroenterologist but I did order a swallow study.   Patient will continue to follow the orthopedic specialist for osteoporosis. Return in about 6 months (around 8/7/2022), or if symptoms worsen or fail to improve. Orders Placed This Encounter   Procedures    FL ESOPHAGRAM     Standing Status:   Future     Standing Expiration Date:   2/7/2023     No orders of the defined types were placed in this encounter. Call or return to clinic prn if these symptoms worsen or fail to improve as anticipated. I have reviewed the instructions with the patient, answering all questions to patient's satisfaction. Tim Armendariz received counseling on the following healthy behaviors: nutrition, exercise, and medication adherence  Reviewed prior labs and health maintenance. Continue current medications, diet and exercise. Discussed use, benefit, and side effects of prescribed medications. Barriers to medication compliance addressed. Patient given educational materials - see patient instructions. All patient questions answered. Patient voiced understanding.       Electronically signed by Debbora Sandhoff, MD on 2/7/2022 at 10:44 AM       (Please note that portions of this note were completed with a voice recognition program. Efforts were made to edit the dictations but occasionally words are mis-transcribed.)

## 2022-02-09 ENCOUNTER — PATIENT MESSAGE (OUTPATIENT)
Dept: FAMILY MEDICINE CLINIC | Age: 62
End: 2022-02-09

## 2022-02-09 DIAGNOSIS — G89.29 CHRONIC INTRACTABLE HEADACHE, UNSPECIFIED HEADACHE TYPE: Primary | ICD-10-CM

## 2022-02-09 DIAGNOSIS — R51.9 CHRONIC INTRACTABLE HEADACHE, UNSPECIFIED HEADACHE TYPE: Primary | ICD-10-CM

## 2022-02-09 RX ORDER — TRAMADOL HYDROCHLORIDE 50 MG/1
50 TABLET ORAL DAILY
Qty: 14 TABLET | Refills: 0 | Status: SHIPPED | OUTPATIENT
Start: 2022-02-09 | End: 2022-02-23

## 2022-02-09 NOTE — TELEPHONE ENCOUNTER
From: Brittney Cortes  To: Dr. Ben Farris: 2/9/2022 1:18 PM EST  Subject: Headaches     I forgot when I was there Monday but I have been getting many more headaches again lately. I was wondering if it would be possible to get some Tramadol to have on hand again in case I need it.  Thanks, Re Schaefer

## 2022-02-16 ENCOUNTER — HOSPITAL ENCOUNTER (OUTPATIENT)
Dept: GENERAL RADIOLOGY | Age: 62
Discharge: HOME | End: 2022-02-18
Payer: COMMERCIAL

## 2022-02-16 DIAGNOSIS — R11.10 REGURGITATION AND RECHEWING OF FOOD: ICD-10-CM

## 2022-02-16 PROCEDURE — 74220 X-RAY XM ESOPHAGUS 1CNTRST: CPT

## 2022-02-16 PROCEDURE — 6370000000 HC RX 637 (ALT 250 FOR IP): Performed by: FAMILY MEDICINE

## 2022-02-16 PROCEDURE — 2500000003 HC RX 250 WO HCPCS: Performed by: FAMILY MEDICINE

## 2022-02-16 RX ADMIN — BARIUM SULFATE 170 ML: 960 POWDER, FOR SUSPENSION ORAL at 12:54

## 2022-02-16 RX ADMIN — ANTACID/ANTIFLATULENT 1 EACH: 380; 550; 10; 10 GRANULE, EFFERVESCENT ORAL at 12:54

## 2022-02-16 RX ADMIN — BARIUM SULFATE 140 ML: 980 POWDER, FOR SUSPENSION ORAL at 12:54

## 2022-04-08 ENCOUNTER — HOSPITAL ENCOUNTER (OUTPATIENT)
Dept: PHYSICAL THERAPY | Facility: CLINIC | Age: 62
Setting detail: THERAPIES SERIES
Discharge: HOME | End: 2022-04-08
Payer: COMMERCIAL

## 2022-04-08 PROCEDURE — 97110 THERAPEUTIC EXERCISES: CPT

## 2022-04-08 PROCEDURE — 97016 VASOPNEUMATIC DEVICE THERAPY: CPT

## 2022-04-08 PROCEDURE — 97161 PT EVAL LOW COMPLEX 20 MIN: CPT

## 2022-04-08 NOTE — CONSULTS
[] [address]   Outpatient Rehabilitation &  Therapy  [address] [place] [address]  P: [phone]  F: [phone] [] [address]  P: [phone]  F: [phone] [] [address]  [facility]   [place]: [phone]  F: [phone]         Physical Therapy Running Evaluation    Date:  2022  Patient: Jessica Martin   : 1960  MRN: [id number]  Physician: Kalli AMATO  Insurance: Shashank Pozo, visits 60 $40 copay, ded/waived, oop douglas Espinoza  Medical Diagnosis:  Irene ITBS  Rehab Codes:  M25.551, M25.552  Onset date: 21    Next 's appt.: none  Goal race: OC  marathon    Subjective:   CC: Irene ITBS  HPI: symptoms started before Nov of last year but worsened when she started training for  marathon. Returned to running in 2021. She used [employer]ears [employer] since having irene great toe fusion. She wore NB before that  Runs 3 days a week; 4 miles x 2 + 10 miles last week. She ran 10 miles last weekend but the pain was terrible. All of her runs are done at a moderate effort. She was running 4 days/wk but had to drop one day due to increased knee pain. PMHx: [] Unremarkable [] Diabetes [] HTN  [] Pacemaker   [] MI/Heart Problems [] Cancer [] Arthritis [x] Other: Irene great toe [id number]/2020, osteoporosis,                [] Refer to full medical chart  In EPIC     Tests: [] X-Ray: [] MRI:  [] none:     Medications: [x] Refer to full medical record [] None [] Other:  Allergies:      [x] Refer to full medical record [] None [] Other:    Working:  [] Normal Duty  [] Light Duty  [] Off D/T Condition  [] Retired    [x] Not Employed    []  Disability  [] Other:           Return to work:     Next goal race: OC  marathon    Pain:  [x] Yes  [] No   Location:   Pain Rating: (0-10 scale)   1. Lateral L knee 2-3/10, worst in a past week 7/10  2.  Lateral R hip 0/10; worst in past week 8/10 after a run  3. Knees   Get achy    Pain altered Tx:  [] Yes  [x] No  Action:  Symptoms:  [] Improving [x] Worsening [] Same  Better:  [] Meds    [] Ice pack    [] Sit    [x] Not running  []Stand    [] Walk    [] Stretching   [] Other:  Worse: [x] Run    [] Easy    [] Speed work    []Stand    [x] Walk aggravates her R lateral hip   [] Stairs    [] Sit    [x] Other: flexing the L knee  Sleep: [x] OK    [] Disturbed    Objective:    ROM  ° A/P wnl at irene hip and knee STRENGTH TESTS (+/-) Left Right Not Tested    Left Right Left Right Ant.  Drawer   []   Hip Flex     Post. Drawer   []   Ext   4 4 Lachmans   []   ER   4 4 Valgus Stress   []   IR     Varus Stress   []   ABD   4- 4- Surinders   []   ADD     Pat-Fem Grind   []   Knee Flex     FADIRs   []   Ext     Hip Scouring   []   Ankle DF     JOHNs neg neg []   PF     Piriformis neg neg []   INV     Collettes   []   EVER     Oral     []   GTE     Tico's   []       OBSERVATION No Deficit Deficit Not Tested Comments   Posture       Forward Head [] [] []    Rounded Shoulders [] [] []    Kyphosis [] [] []    Lordosis [] [] []    Scoliosis [] [] []    Iliac Crest [] [] []    PSIS [] [] []    ASIS [] [] []    Genu Valgus [] [] []    Genu Varus [] [] []    Genu Recurvatum [] [] []    Pronation [] [] []    Supination [] [] []    Leg Length Discrp [] [] []    Slumped Sitting [] [] []    Palpation [] [x] [] irene gluts, VL   Sensation [] [] []    Edema [] [] []    Neurological [] [] []    Patellar Mobility [] [] []    Patellar Orientation [] [] []    Gait [] [] [] Analysis:deferred as she did not have her running apparel         FUNCTIONAL TESTS PAIN NO PAIN COMMENTS   Step Test  [] [] 4/6/8\"   2 legged squat [] []    1 legged squat [x] [] + valgus   10x 1 legged squat  [] []    Posterior tibialis dysfunction [] []    # of 1 legged calf raises                            Functional Test: UWRI Score: 64% functionally impaired Comments:  Assessment:Patient would benefit from skilled physical therapy services in order to: improve glut max and med function to control frontal plane weakness and improve running mechanics  Problems:    [x] ? Pain: in L knee and R hip      [x] ? Strength:    [x] ? Function: Not able to run as she wishes   [x] Postural Deviations hip add postures  [x] Gait Deviations  [x]  UWRI score is   [] Other:      STG: (to be met in 5 treatments)  1. ? Pain:<4/10 so that she can resume running  2. ? Strength:to >4+/5  3. ? Function: able to run up to 5 miles   4. UWRI  score to >23/36  5. Independent with Home Exercise Programs    LTG: (to be met in 10 treatments)  1. No pain in irene hips  2. MMT with hip abd and ext to 5/5  3. UWRI  score to >30/36  4. Able to run OC 1/2 marthon  5. Patient goals: 'back to running with less pain\"    Rehab Potential:  [x] Good  [] Fair  [] Poor   Suggested Professional Referral:  [x] No  [] Yes:  Barriers to Goal Achievement[de-identified]  [x] No  [] Yes:  Domestic Concerns:  [x] No  [] Yes:    Pt. Education:  [x] Plans/Goals, Risks/Benefits discussed  [x] Home exercise program    Method of Education: [x] Verbal   Cleared to ride [employer]   Reviewed run/not run criteria. Reviewed how to use an ice pack 3x15 min  No running at this time. [x] Demo  [x] Written  Access Code: [email or web address]  URL: [email or web address]  Date: 04/08/2022  Prepared by: Annia Watson    Exercises  Single Leg Bridge - 2 x daily - 7 x weekly - 2 sets - 10 reps  Sidelying Hip Abduction - 2 x daily - 7 x weekly - 2 sets - 10 reps  Clamshell with Resistance - 2 x daily - 7 x weekly - 2 sets - 10-20 reps  Prone heel squeeze + lift - 2 x daily - 7 x weekly - 2 sets - 10 reps      Comprehension of Education:  [] Verbalizes understanding. [] Demonstrates understanding. [x] Needs Review. [] Demonstrates/verbalizes understanding of HEP/Ed previously given.     Treatment Plan:  [x] Therapeutic Exercise [x] Therapeutic Activity  [x] Manual Therapy   [x] Alter G treadmill  [x] Phys perf test     [x] Vasocompression/Game Ready   [x] Neuromuscular Re-education [x] Instruction in HEP     [x] Aquatic Therapy                           Frequency:  1x/week for 10 visits    Todays Treatment:  Modalities:    Treatment Location  Left      Right                          Position   knee [x]          []  [] Supine    [] Prone   [] Side lying  [] Sitting          Treatment Modality   2 Vasocompression    34° temp    Med pressure     15min   1 Other:  ex       Precautions: standard  Exercises:  Exercise Reps/ Time Weight/ Level Issued for HEP  Comments   Prone        Flying squirrels 2x10  x x    Supine        1 legged bridges 2x10  x x    Sidelying        Clams 90/30 deg        Susan hip abd        Gym        Tippy bird     1/2 legged   Monster walks        Hip thrusts        Step downs     Posterior and lateral   Posterior sling ex     On cable column   Ski jumper lunges        Functional reach        Reverse twisting lunge        RDLs     Retract scaps, one hand over, one hand under   Resisted C skip      Neutral L spine   Front squats     No L ext   Resisted Push Press        Ninja jumps     Soft landings on box; jump up/step down   Depth drops        Depth jumps     Jump down w/ low veronica   Med ball cleans     Start on chest, elbows wide   Other:    Specific Instructions for next treatment:  1.   Perform run analysis: she was instructed to bring her running     Treatment Charges: Mins Units   [x] Evaluation       [x]  Low       []  Moderate       []  High  1   [] Phys perf test     [x]  Ther Exercise 20 1   []  Manual Therapy     []  Ther Activities     []  Aquatics     [x]  Vasocompression 15 1   []  NMR       TOTAL TREATMENT TIME:     Time in: 7211  Time CIC:8611    Electronically signed by: Froy Pope PT        Physician Signature:________________________________Date:__________________  By signing above or cosigning this note, I have reviewed this plan of care and certify a need for medically necessary rehabilitation services.      *PLEASE SIGN ABOVE AND FAX BACK ALL PAGES*

## 2022-04-12 ENCOUNTER — HOSPITAL ENCOUNTER (OUTPATIENT)
Dept: PHYSICAL THERAPY | Facility: CLINIC | Age: 62
Setting detail: THERAPIES SERIES
Discharge: HOME | End: 2022-04-12
Payer: COMMERCIAL

## 2022-04-12 PROCEDURE — 97750 PHYSICAL PERFORMANCE TEST: CPT

## 2022-04-12 PROCEDURE — 97110 THERAPEUTIC EXERCISES: CPT

## 2022-04-12 NOTE — FLOWSHEET NOTE
[x] [facility]    [address] [place], [place]    Phone: [phone]    Fax:  [phone]       Physical Therapy Daily Treatment Note    Date:  2022  Patient Name:  Mele Ashford   :  1960  MRN: [id number]  Physician: Niki AMATO                      Insurance: Skye Kirby 150, visits 60/60 $40 copay, ded/waived, oop [facility]  Medical Diagnosis:   Fred ITBS                      Rehab Codes:  M25.551, M25.552  Onset date: 21                                      Next 's appt.: none  Goal race: OC 1/2 marathon  Visit# / total visits: 2/ 10   Cancels/No Shows: 0/0    Subjective:    Pain:  [x] Yes  [] No Location:  Pain Rating: (0-10 scale) 310  Pain altered Tx:  [x] No  [] Yes  Action:  Comments: biking with her current bike seat is painful. Points to R buttock, R distal BF tendon, and L lateral femoral condyle as painful     Objective:  Modalities:    Treatment Location  Left      Right                          Position   knee [x]? []?  []? Supine    []? Prone   []? Side lying  []?  Sitting                                            Treatment Modality   -- Vasocompression    34° temp    Med pressure     15min   1 Other:  ex         Precautions: standard  Exercises:  Exercise Reps/ Time Weight/ Level Issued for HEP   Comments   Prone             Flying squirrels 2x10   x x     Supine             1 legged bridges 2x10   x x     Sidelying             Clams 90/30 deg 2x10   4/12 x     Susan hip abd  2x10   4/12 x     Gym             Monster walks 4x20\" black 4/12 x     Step downs 2x10 6\" 4/12 x Posterior and lateral   Heel taps 2x10 4\" 4/12 x    Chair squats    4/12 *    Lunge walks   4/12 *                    Other:  Video Run Analysis   Warm up:   Pace: 10:30  min/mile    Shoes: HOKA Ez + Protalus inserts    Erika:  spm    Frontal plane deviations:    Decreased pronation    Dynamic genu valgus    Contralateral pelvic drop    I     Sagittal plane deviations:     Increased  knee extension at initial contact    Elevated foot ground angle at initial contact       Recommendations:     No changes recommended at this time. Consider cueing for increased knee flexion at initial contact.          Specific Instructions for next treatment:  1.  continue to encourage a strengthening program        Treatment Charges: Mins Units   [x]  Phys perf test 13 1   [x]  Ther Exercise 45 3   []  Manual Therapy     []  Ther Activities     []  NMR     []  Vasocompression     []  Other     Total Treatment time 58 4       Assessment: [x] Progressing toward goals. She was advanced on her HEP. Running mechanics revealed mild frontal plane weakness, but does have a tendency to overstride. [] No change. [] Other:    Goals:  STG: (to be met in 5 treatments)  1. ? Pain:<4/10 so that she can resume running  2. ? Strength:to >4+/5  3. ? Function: able to run up to 5 miles   4. UWRI  score to >23/36  5. Independent with Home Exercise Programs     LTG: (to be met in 10 treatments)  1. No pain in irene hips  2. MMT with hip abd and ext to 5/5  3. UWRI  score to >30/36  4. Able to run OC 1/2 marthon  5.                   Patient goals: 'back to running with less pain\"    Pt. Education:  [x] Yes  [] No  [x] Reviewed Prior HEP/Ed  Method of Education: [x]? Verbal   Cleared to run 2 miles today/ if ok, then 4 miles on Thursday, if ok, then max at 6 on Saturday. Reviewed run/not run criteria. Reviewed how to use an ice pack 3x15 min       [x] Tru   [x] Written  Access Code: [email or web address]  URL: [email or web address]  Date: 04/12/2022  Prepared by: Peyton Ni    Exercises  Single Leg Bridge - 2 x daily - 5 x weekly - 2 sets - 10 reps  Clamshell with Resistance - 2 x daily - 5 x weekly - 2 sets - 10-20 reps  Sidelying Hip Abduction at Wall - 2 x daily - 5 x weekly - 3 sets - 10 reps  Prone heel squeeze + lift - 2 x daily - 5 x weekly - 2 sets -

## 2022-04-14 ENCOUNTER — HOSPITAL ENCOUNTER (OUTPATIENT)
Dept: PHYSICAL THERAPY | Facility: CLINIC | Age: 62
Setting detail: THERAPIES SERIES
Discharge: HOME | End: 2022-04-14
Payer: COMMERCIAL

## 2022-04-14 PROCEDURE — 97110 THERAPEUTIC EXERCISES: CPT

## 2022-04-14 NOTE — FLOWSHEET NOTE
[x] [facility]    [address] [place], [place]    Phone: [phone]    Fax:  [phone]       Physical Therapy Daily Treatment Note    Date:  2022  Patient Name:  Teresa Ponce   :  1960  MRN: [id number]  Physician: April AMATO                      Insurance: Corey Ervin, visits 60/60 $40 copay, ded/waived, oop max Feliciana Councilman  Medical Diagnosis:   Fred ITBS                      Rehab Codes:  M25.551, M25.552  Onset date: 21                                      Next 's appt.: none  Goal race: OC  marathon  Visit# / total visits: 3/ 10   Cancels/No Shows: 0/0    Subjective:    Pain:  [x] Yes  [] No Location:  Pain Rating: (0-10 scale) 0/10  Pain altered Tx:  [x] No  [] Yes  Action:  Comments: doing well. She notices increased muscular soreness in VMO. She ran 2 miles and had only a little pain\" 210. Using ice packs after each run, even if she doesn't have pain    Objective:  Modalities:    Treatment Location  Left      Right                          Position   knee [x]? []?  []? Supine    []? Prone   []? Side lying  []?  Sitting                                            Treatment Modality   -- Vasocompression    34° temp    Med pressure     15min   1 Other:  ex         Precautions: standard  Exercises:  Exercise Reps/ Time Weight/ Level Issued for HEP   Comments   Lateral elliptical 4' L1  x Alt every minute   Prone             Flying squirrels 2x10   x --     Supine             1 legged bridges 2x10   x -- Advanced to SB bridges   2 legged bridges 20  x x On small yellow ball   Sidelying             Clams 90/30 deg 2x10  MRE 4/12 x     Susan hip abd  2x10   4/12 -- Advanced to side plank abd   Side plank hip abd 10   X    Gym             Monster walks 4x20\" black 4/12 x     Step downs 2x15 6\" 4/12 X Posterior and lateral   Heel taps 2x10 4\" 4/12 x    Chair squats  2x15  4/12 x Black band around knees   Lunge with Resistance - 2 x daily - 5 x weekly - 2 sets - 10-20 reps  Lateral Step Down - 2 x daily - 5 x weekly - 2 sets - 10 reps  Forward Step Down - 2 x daily - 5 x weekly - 2 sets - 10 reps  Mini Lunge - 2 x daily - 5 x weekly - 2 sets - 10 reps  Squat with Chair Touch - 2 x daily - 5 x weekly - 2 sets - 10 reps  Side Stepping with Resistance at Feet - 2 x daily - 5 x weekly - 4 sets - 10-15 reps  Modified Side Plank with Hip Abduction - 1 x daily - 5 x weekly - 1-2 sets - 10 reps  Supine Bridge with Heels on Swiss Ball and Knees Bent - 2 x daily - 5 x weekly - 1 sets - 20 reps  Quadruped Leg Lifts - 1 x daily - 5 x weekly - 3 sets - 10 reps        Comprehension of Education:  [] Verbalizes understanding. [] Demonstrates understanding. [x] Needs review. [] Demonstrates/verbalizes HEP/Ed previously given. Plan: [x] Continue per plan of care.  2x/wk   [] Other:     Time In: 1000  Time Out: 0200    Electronically signed by:  Karly Vicente, PT

## 2022-04-19 ENCOUNTER — HOSPITAL ENCOUNTER (OUTPATIENT)
Dept: PHYSICAL THERAPY | Facility: CLINIC | Age: 62
Setting detail: THERAPIES SERIES
Discharge: HOME | End: 2022-04-19
Payer: COMMERCIAL

## 2022-04-19 PROCEDURE — 97140 MANUAL THERAPY 1/> REGIONS: CPT

## 2022-04-19 PROCEDURE — 97016 VASOPNEUMATIC DEVICE THERAPY: CPT

## 2022-04-19 NOTE — FLOWSHEET NOTE
[x] [facility]    [address] [place], [place]    Phone: [phone]    Fax:  [phone]       Physical Therapy Daily Treatment Note    Date:  2022  Patient Name:  Jessica Liman   :  1960  MRN: [id number]  Physician: Kalli AMATO                      Insurance: Shashank Pozo, visits 60/60 $40 copay, ded/waived, oop douglas Espinoza  Medical Diagnosis:   Fred ITBS                      Rehab Codes:  M25.551, M25.552  Onset date: 21                                      Next 's appt.: none  Goal race: OC 1/2 marathon  Visit# / total visits: 4/ 10   Cancels/No Shows: 0/0    Subjective:    Pain:  [x] Yes  [] No Location:  Pain Rating: (0-10 scale) R hip 3/10; L knee 0/10  Pain altered Tx:  [] No  [x] Yes  Action:  Comments: Just aches in R TFL region and makes her whole leg ache. Ran 6 miles on Saturday and by the end was 5/10 in L knee/R hip 4-5/10 . : R hip hurt and limped around. Yesterday was better. No longer having issues walking after a run. She did her HEP 2x/day. Lateral step downs and heel taps make the R hip hurt. She no longer does lunges due to pain. Objective:  Modalities:    Treatment Location  Left      Right                          Position   knee [x]? []?  []? Supine    []? Prone   []? Side lying  []?  Sitting                                            Treatment Modality   -- Vasocompression    34° temp    Med pressure     15min   1 Other:  ex         Precautions: standard  Exercises:  Exercise Reps/ Time Weight/ Level Issued for HEP   Comments   Lateral elliptical 4' L1   Alt every minute   Prone             Flying squirrels 2x10   x --     Supine             Oral stretch 3x30\"   X    1 legged bridges 2x10   x -- Advanced to SB bridges   2 legged bridges 20  x  On small yellow ball   Sidelying            Clams 90/30 deg 2x10  MRE 4/12      Susan hip abd  2x10   4/12  Advanced to side plank abd   Side plank hip abd 10       Gym            Monster walks 4x20\" black 4/12      Step downs 2x15 6\" 4/12  Posterior and lateral   Heel taps 2x10 4\" 4/12     Chair squats  2x15  4/12  Black band around knees   Lunge walks 2x20'  4/12     1/2 kneeling hip flexor stretch 3x30\"   x            Other:  Manual  1.  DI to R proximal and distal hip flexor  2. IASTM to R buttock with hypervolt    Video Run Analysis   Warm up:   Pace: 10:30  min/mile    Shoes: WALLY [place] + Protalus inserts    Erika:  spm    Frontal plane deviations:    Decreased pronation    Dynamic genu valgus    Contralateral pelvic drop         Sagittal plane deviations:     Increased  knee extension at initial contact    Elevated foot ground angle at initial contact       Recommendations:     No changes recommended at this time. Consider cueing for increased knee flexion at initial contact. After the race this Sunday, consider removing the Protalus inserts to allow for more pronation and decrease tendency for genu varus         Specific Instructions for next treatment:  1.  continue to encourage a strengthening program    Assessment: [x] Progressing toward goals. Pt noted a significant improvement after manual as her limp was mostly resolved. Hip ext ROM was WNL afterwards, whereas it was ~50% before. She was very tender on R TFL, hip flexor and buttock. [] No change. [] Other:    Goals:  STG: (to be met in 5 treatments)  1. ? Pain:<4/10 so that she can resume running  2. ? Strength:to >4+/5  3. ? Function: able to run up to 5 miles   4. UWRI  score to >23/36  5. Independent with Home Exercise Programs     LTG: (to be met in 10 treatments)  1. No pain in irene hips  2. MMT with hip abd and ext to 5/5  3. UWRI  score to >30/36  4. Able to run OC 1/2 marthon                   Patient goals: 'back to running with less pain\"    Pt. Education:  [x] Yes  [] No  [x] Reviewed Prior HEP/Ed  Method of Education: [x]?  Verbal     Reviewed run/not run criteria. Reviewed how to use an ice pack 3x15 min  Reviewed the need to use walk mostly/run some as she is planning to still do the 1/2 marathon this weekend  Reviewed how to use the hypervolt in sidelying to her hip. [x] Tru   [x] Written    Access Code: [email or web address]  URL: [email or web address]  Date: 04/19/2022  Prepared by: Sandy Sanchez    Exercises  Single Leg Bridge - 2 x daily - 5 x weekly - 2 sets - 10 reps  Clamshell with Resistance - 2 x daily - 5 x weekly - 2 sets - 10-20 reps  Lateral Step Down - 2 x daily - 5 x weekly - 2 sets - 10 reps  Forward Step Down - 2 x daily - 5 x weekly - 2 sets - 10 reps  Squat with Chair Touch - 2 x daily - 5 x weekly - 2 sets - 10 reps  Side Stepping with Resistance at Feet - 2 x daily - 5 x weekly - 4 sets - 10-15 reps  Modified Side Plank with Hip Abduction - 1 x daily - 5 x weekly - 1-2 sets - 10 reps  Supine Bridge with Heels on Swiss Ball and Knees Bent - 2 x daily - 5 x weekly - 1 sets - 20 reps  Quadruped Leg Lifts - 1 x daily - 5 x weekly - 3 sets - 10 reps  Oral Stretch on Table - 2 x daily - 7 x weekly - 1 sets - 3 reps - 30 sec hold      Comprehension of Education:  [] Verbalizes understanding. [] Demonstrates understanding. [x] Needs review. [] Demonstrates/verbalizes HEP/Ed previously given. Plan: [x] Continue per plan of care.  2x/wk   [] Other:     Time In: 1000  Time Out: 1108    Electronically signed by:  Sandy Sanchez, PT

## 2022-04-21 ENCOUNTER — HOSPITAL ENCOUNTER (OUTPATIENT)
Dept: PHYSICAL THERAPY | Facility: CLINIC | Age: 62
Setting detail: THERAPIES SERIES
Discharge: HOME | End: 2022-04-21
Payer: COMMERCIAL

## 2022-04-21 PROCEDURE — 97016 VASOPNEUMATIC DEVICE THERAPY: CPT

## 2022-04-21 PROCEDURE — 97110 THERAPEUTIC EXERCISES: CPT

## 2022-04-21 NOTE — FLOWSHEET NOTE
[x] [facility]    [address] [place], [place]    Phone: [phone]    Fax:  [phone]       Physical Therapy Daily Treatment Note    Date:  2022  Patient Name:  Bridget Frias   :  1960  MRN: [id number]  Physician: Hazel AMATO                      Insurance: Jesus Harleen, visits 60/60 $40 copay, ded/waived, oop douglas Ross  Medical Diagnosis:   Fred ITBS                      Rehab Codes:  M25.551, M25.552  Onset date: 21                                      Next 's appt.: none  Goal race: OC  marathon  Visit# / total visits: 5/ 10   Cancels/No Shows: 0/0    Subjective:    Pain:  [x] Yes  [] No Location:  Pain Rating: (0-10 scale) R hip 0/10; L knee 0/10  Pain altered Tx:  [] No  [x] Yes  Action:  Comments: Her R hip is much better since last visit. L knee is a little achy. She has a bruise on R buttock from using the Hypervolt the last treatment. Objective:  Modalities:    Treatment Location  Left      Right                          Position   knee [x]? []?  []? Supine    []? Prone   []? Side lying  []?  Sitting                                            Treatment Modality   3 Vasocompression    34° temp    Med pressure     15min   1,2 Other:  ex         Precautions: standard  Exercises:  Exercise Reps/ Time Weight/ Level Issued for HEP   Comments   Lateral elliptical 4' L1  -- Alt every minute   Prone             Flying squirrels 2x10   x x 2 pillows   Hip ext 2x10  x x 2 pillows   Supine             Oral stretch 3x30\"   x    1 legged bridges 2x10   x x Advanced to SB bridges   2 legged bridges 20  x x On small yellow ball   Sidelying            Clams 90/30 deg 2x10  MRE 4/12 --     Susan hip abd  2x10   4/12 -- Advanced to side plank abd   Side plank hip abd 10       Gym            Monster walks 4x20\" black 4/12      Step downs 2x15 6\" 4/12  Posterior and lateral   Heel taps 2x10 4\" 4/12     Chair squats 2x15  4/12  Black band around knees   Lunge walks 2x20'  4/12     1/2 kneeling hip flexor stretch 3x30\"   x            Other:  Manual  1.  DI to R proximal and distal hip flexor  2. IASTM to R buttock with hypervolt-not on 4/21    Video Run Analysis   Warm up:   Pace: 10:30  min/mile    Shoes: WALLY [place] + Protalus inserts    Erika:  spm    Frontal plane deviations:    Decreased pronation    Dynamic genu valgus    Contralateral pelvic drop         Sagittal plane deviations:     Increased  knee extension at initial contact    Elevated foot ground angle at initial contact       Recommendations:     No changes recommended at this time. Consider cueing for increased knee flexion at initial contact. After the race this Sunday, consider removing the Protalus inserts to allow for more pronation and decrease tendency for genu varus         Specific Instructions for next treatment:  1.  continue to encourage a strengthening program    Assessment: [x] Progressing toward goals. Decreased tenderness on hip flexor. =hip ext ROM. Performed basic hip ex. [] No change. [] Other:    Goals:  STG: (to be met in 5 treatments)  1. ? Pain:<4/10 so that she can resume running  2. ? Strength:to >4+/5  3. ? Function: able to run up to 5 miles   4. UWRI  score to >23/36  5. Independent with Home Exercise Programs     LTG: (to be met in 10 treatments)  1. No pain in irene hips  2. MMT with hip abd and ext to 5/5  3. UWRI  score to >30/36  4. Able to run OC 1/2 [facility]                   Patient goals: 'back to running with less pain\"    Pt. Education:  [x] Yes  [] No  [x] Reviewed Prior HEP/Ed  Method of Education: [x]? Verbal     Reviewed run/not run criteria. Reviewed how to use an ice pack 3x15 min  Reviewed the need to use walk mostly/run some as she is planning to still do the 1/2 marathon this weekend  Reviewed how to use the hypervolt in sidelying to her hip.       [x] Tru   [x] Written  Access Code: [email or web address]  URL: [email or web address]  Date: 04/21/2022  Prepared by: Malgorzata Rodriguez    Exercises  Single Leg Bridge - 2 x daily - 5 x weekly - 2 sets - 10 reps  Clamshell with Resistance - 2 x daily - 5 x weekly - 2 sets - 10-20 reps  Lateral Step Down - 2 x daily - 5 x weekly - 2 sets - 10 reps  Forward Step Down - 2 x daily - 5 x weekly - 2 sets - 10 reps  Squat with Chair Touch - 2 x daily - 5 x weekly - 2 sets - 10 reps  Side Stepping with Resistance at Feet - 2 x daily - 5 x weekly - 4 sets - 10-15 reps  Modified Side Plank with Hip Abduction - 1 x daily - 5 x weekly - 1-2 sets - 10 reps  Supine Bridge with Heels on Swiss Ball and Knees Bent - 2 x daily - 5 x weekly - 1 sets - 20 reps  Quadruped Leg Lifts - 1 x daily - 5 x weekly - 3 sets - 10 reps  Oral Stretch on Table - 2 x daily - 7 x weekly - 1 sets - 3 reps - 30 sec hold  Prone hip extension w/ 1 pillow - 2 x daily - 5 x weekly - 2-3 sets - 10 reps  Prone heel squeeze + lift - 2 x daily - 5 x weekly - 2 sets - 15 reps         Treatment Charges: Mins Units   []? Phys perf test       [x]? Ther Exercise 32 2   []? Manual Therapy       []? Ther Activities       []? NMR       [x]? Vasocompression 15 1   []? Other       Total Treatment time 47 3        Comprehension of Education:  [] Verbalizes understanding. [] Demonstrates understanding. [x] Needs review. [] Demonstrates/verbalizes HEP/Ed previously given. Plan: [x] Continue per plan of care.  2x/wk   [] Other:     Time In: 1120  Time Out: [address]  Electronically signed by:  Malgorzata Rodriguez, PT

## 2022-04-25 LAB — MAMMOGRAPHY, EXTERNAL: NORMAL

## 2022-04-26 ENCOUNTER — HOSPITAL ENCOUNTER (OUTPATIENT)
Dept: PHYSICAL THERAPY | Facility: CLINIC | Age: 62
Setting detail: THERAPIES SERIES
Discharge: HOME | End: 2022-04-26
Payer: COMMERCIAL

## 2022-04-26 PROCEDURE — 97110 THERAPEUTIC EXERCISES: CPT

## 2022-04-26 NOTE — FLOWSHEET NOTE
[x] [facility]    [address] [place], [place]    Phone: [phone]    Fax:  [phone]       Physical Therapy Daily Treatment Note    Date:  2022  Patient Name:  Erickson Wright   :  1960  MRN: [id number]  Physician: Anabell AMATO                      Insurance: Skye Armanigigifanibroderickdominic Granadosru 150, visits 60/60 $40 copay, ded/waived, oop [facility]  Medical Diagnosis:   Fred ITBS                      Rehab Codes:  M25.551, M25.552  Onset date: 21                                      Next 's appt.: none  Goal race: 22 [place] 10k  Visit# / total visits: 6/ 10   Cancels/No Shows: 0/0    Subjective:    Pain:  [x] Yes  [] No Location:  Pain Rating: (0-10 scale) R hip 0/10; L knee 5/10  Pain altered Tx:  [] No  [x] Yes  Action:  Comments: Her R hip is painful. L knee is painful with walking and knee flexion. No pain at knee flexion. Declined game ready as she will ice at home  Objective:  Modalities:    Treatment Location  Left      Right                          Position   knee [x]? []?  []? Supine    []? Prone   []? Side lying  []?  Sitting                                            Treatment Modality   3 Vasocompression    34° temp    Med pressure     15min   1,2 Other:  ex         Precautions: standard  Exercises:  Exercise Reps/ Time Weight/ Level Issued for HEP   Comments   Lateral elliptical 4' L1  -- Alt every minute   Prone             Flying squirrels 2x10   x  2 pillows   Hip ext 2x10  x  2 pillows   Supine            Oral stretch 3x30\"       1 legged bridges 2x10   x  Advanced to SB bridges   2 legged bridges 20  x  On small yellow ball   Sidelying            Clams 90/30 deg 2x10  MRE 4/12      Susan hip abd  2x10   4/12  Advanced to side plank abd   Side plank hip abd 10       Gym            Monster walks 4x20\" black 4/12      Step downs 2x15 6\" 4/12  Posterior and lateral   Heel taps 2x10 4\" 4/12     Chair squats  2x15 4/12  Black band around knees   Lunge walks 2x20'  4/12 1/2 kneeling hip flexor stretch 3x30\"               Other:  Manual  1.  DI to R proximal and distal hip flexor  2. IASTM to R buttock with hypervolt-not on 4/21    Video Run Analysis   Warm up:   Pace: 10:30  min/mile    Shoes: WALLY [place] + Protalus inserts    Erika:  spm    Frontal plane deviations:    Decreased pronation    Dynamic genu valgus    Contralateral pelvic drop         Sagittal plane deviations:     Increased  knee extension at initial contact    Elevated foot ground angle at initial contact       Recommendations:     No changes recommended at this time. Consider cueing for increased knee flexion at initial contact. After the race this Sunday, consider removing the Protalus inserts to allow for more pronation and decrease tendency for genu varus         Specific Instructions for next treatment:  1.  continue to encourage a strengthening program    Assessment: [x] Progressing toward goals. Her distal L ITB remains irritable from the race. Her R hip is not an issue at this point. She was very tender on L TFL, vastus lateralis, and L glut. Hip ext was limited buy improved after manual stretching. Reviewed HEP at the pt's request because exercises were added that were incorrect. [] No change. [] Other:    Goals:  STG: (to be met in 5 treatments)  1. ? Pain:<4/10 so that she can resume running  2. ? Strength:to >4+/5  3. ? Function: able to run up to 5 miles   4. UWRI  score to >23/36  5. Independent with Home Exercise Programs     LTG: (to be met in 10 treatments)  1. No pain in irene hips  2. MMT with hip abd and ext to 5/5  3. UWRI  score to >30/36  4. Able to run OC 1/2 marthon                   Patient goals: 'back to running with less pain\"    Pt. Education:  [x] Yes  [] No  [x] Reviewed Prior HEP/Ed  Method of Education: [x]?  Verbal       Reviewed how to use an ice pack 3x15 min     Reviewed how to use the hypervolt in sidelying to her hip. Reviewed that she needs to walk normally for a few days before she should start back to running but keep it short. [] Demo   [x] Written  Access Code: [email or web address]  URL: [email or web address]  Date: 04/26/2022  Prepared by: Philipp Boyd    Exercises  Single Leg Bridge - 2 x daily - 3 x weekly - 2 sets - 10 reps  Lateral Step Down - 2 x daily - 3 x weekly - 2 sets - 70 seconds hold  Side Stepping with Resistance at Feet - 2 x daily - 5 x weekly - 4 sets - 10-15 reps  Modified Side Plank with Hip Abduction - 1 x daily - 3 x weekly - 1-2 sets - 10 reps  Supine Bridge with Heels on Swiss Ball and Knees Bent - 2 x daily - 3 x weekly - 1 sets - 20 reps  Oral Stretch on Table - 2 x daily - 7 x weekly - 1 sets - 3 reps - 30 sec hold  Forward Step Down - 2 x daily - 5 x weekly - 1 sets - 20 reps  Squat with Chair Touch and Resistance Loop - 1 x daily - 5 x weekly - 3 sets - 10 reps  Side Stepping with Resistance at Feet - 2 x daily - 5 x weekly - 4 sets - 10-15 reps  Quadruped Leg Lifts - 1 x daily - 5 x weekly - 3 sets - 10 reps  Prone Hip Extension with Bent Knee - Two Pillows - 2 x daily - 5 x weekly - 2 sets - 10 reps  Prone heel squeeze + lift - 2 x daily - 5 x weekly - 2 sets - 15 reps         Treatment Charges: Mins Units   []? Phys perf test       [x]? Ther Exercise     [x]? Manual Therapy 25 2   []? Ther Activities     []? NMR     []? Vasocompression     []? Other     Total Treatment time          Comprehension of Education:  [] Verbalizes understanding. [] Demonstrates understanding. [] Needs review. [x] Demonstrates/verbalizes HEP/Ed previously given. Plan: [] Continue per plan of care. [x] Other: pt wishes to hold off on scheduling more PT at this time.       Time In: 1420  Time Out: 1500    Electronically signed by:  Philipp Boyd, PT

## 2022-05-27 RX ORDER — PRAVASTATIN SODIUM 40 MG
TABLET ORAL
Qty: 90 TABLET | Refills: 1 | Status: SHIPPED | OUTPATIENT
Start: 2022-05-27

## 2022-05-27 NOTE — TELEPHONE ENCOUNTER
Tre Webber is calling to request a refill on the following medication(s):    Last Visit Date (If Applicable):  8/7/9733    Next Visit Date:    6/20/2022    Medication Request:  Requested Prescriptions     Pending Prescriptions Disp Refills    pravastatin (PRAVACHOL) 40 MG tablet [Pharmacy Med Name: PRAVASTATIN SODIUM 40 MG TAB] 90 tablet 1     Sig: TAKE 1 TABLET BY MOUTH EVERY DAY

## 2022-06-15 RX ORDER — RABEPRAZOLE SODIUM 20 MG/1
TABLET, DELAYED RELEASE ORAL
Qty: 90 TABLET | Refills: 1 | Status: SHIPPED | OUTPATIENT
Start: 2022-06-15

## 2022-06-15 NOTE — TELEPHONE ENCOUNTER
Wai Trevizo is calling to request a refill on the following medication(s):    Last Visit Date (If Applicable):  9/1/0722    Next Visit Date:    6/20/2022    Medication Request:  Requested Prescriptions     Pending Prescriptions Disp Refills    RABEprazole (ACIPHEX) 20 MG tablet [Pharmacy Med Name: RABEPRAZOLE SOD DR 20 MG TAB] 90 tablet 1     Sig: TAKE 1 TABLET BY MOUTH EVERY DAY

## 2022-06-20 ENCOUNTER — OFFICE VISIT (OUTPATIENT)
Dept: FAMILY MEDICINE CLINIC | Age: 62
End: 2022-06-20
Payer: COMMERCIAL

## 2022-06-20 VITALS
DIASTOLIC BLOOD PRESSURE: 89 MMHG | OXYGEN SATURATION: 98 % | TEMPERATURE: 98 F | BODY MASS INDEX: 18.84 KG/M2 | HEART RATE: 75 BPM | WEIGHT: 113.2 LBS | SYSTOLIC BLOOD PRESSURE: 138 MMHG

## 2022-06-20 DIAGNOSIS — G89.29 CHRONIC INTRACTABLE HEADACHE, UNSPECIFIED HEADACHE TYPE: Primary | ICD-10-CM

## 2022-06-20 DIAGNOSIS — R51.9 CHRONIC INTRACTABLE HEADACHE, UNSPECIFIED HEADACHE TYPE: Primary | ICD-10-CM

## 2022-06-20 PROCEDURE — 99213 OFFICE O/P EST LOW 20 MIN: CPT | Performed by: FAMILY MEDICINE

## 2022-06-20 RX ORDER — METHOCARBAMOL 500 MG/1
500 TABLET, FILM COATED ORAL 4 TIMES DAILY
Qty: 40 TABLET | Refills: 0 | Status: SHIPPED | OUTPATIENT
Start: 2022-06-20 | End: 2022-06-30

## 2022-06-20 RX ORDER — BUTALBITAL, ACETAMINOPHEN AND CAFFEINE 50; 325; 40 MG/1; MG/1; MG/1
1 TABLET ORAL EVERY 4 HOURS PRN
Qty: 30 TABLET | Refills: 0 | Status: SHIPPED | OUTPATIENT
Start: 2022-06-20

## 2022-06-20 ASSESSMENT — PATIENT HEALTH QUESTIONNAIRE - PHQ9
SUM OF ALL RESPONSES TO PHQ QUESTIONS 1-9: 0
1. LITTLE INTEREST OR PLEASURE IN DOING THINGS: 0
2. FEELING DOWN, DEPRESSED OR HOPELESS: 0
SUM OF ALL RESPONSES TO PHQ QUESTIONS 1-9: 0
SUM OF ALL RESPONSES TO PHQ9 QUESTIONS 1 & 2: 0

## 2022-07-01 RX ORDER — LOSARTAN POTASSIUM 100 MG/1
TABLET ORAL
Qty: 90 TABLET | Refills: 1 | Status: SHIPPED | OUTPATIENT
Start: 2022-07-01

## 2022-07-18 RX ORDER — TIZANIDINE 2 MG/1
TABLET ORAL
Qty: 90 TABLET | Refills: 1 | Status: SHIPPED | OUTPATIENT
Start: 2022-07-18

## 2022-07-18 NOTE — TELEPHONE ENCOUNTER
Linda Gupta is calling to request a refill on the following medication(s):    Last Visit Date (If Applicable):  1/16/2624    Next Visit Date:    8/15/2022    Medication Request:  Requested Prescriptions     Pending Prescriptions Disp Refills    tiZANidine (ZANAFLEX) 2 MG tablet [Pharmacy Med Name: TIZANIDINE HCL 2 MG TABLET] 90 tablet 1     Sig: TAKE 1 TABLET BY MOUTH EVERY DAY AT NIGHT

## 2022-07-30 DIAGNOSIS — I10 ESSENTIAL HYPERTENSION: ICD-10-CM

## 2022-08-01 RX ORDER — SPIRONOLACTONE 50 MG/1
TABLET, FILM COATED ORAL
Qty: 90 TABLET | Refills: 1 | Status: SHIPPED | OUTPATIENT
Start: 2022-08-01

## 2022-08-15 ENCOUNTER — OFFICE VISIT (OUTPATIENT)
Dept: FAMILY MEDICINE CLINIC | Age: 62
End: 2022-08-15
Payer: COMMERCIAL

## 2022-08-15 VITALS
SYSTOLIC BLOOD PRESSURE: 135 MMHG | HEIGHT: 65 IN | TEMPERATURE: 97.7 F | DIASTOLIC BLOOD PRESSURE: 88 MMHG | BODY MASS INDEX: 18.96 KG/M2 | OXYGEN SATURATION: 100 % | WEIGHT: 113.8 LBS | HEART RATE: 75 BPM

## 2022-08-15 DIAGNOSIS — I10 ESSENTIAL HYPERTENSION: Primary | ICD-10-CM

## 2022-08-15 PROCEDURE — 99213 OFFICE O/P EST LOW 20 MIN: CPT | Performed by: FAMILY MEDICINE

## 2022-08-15 RX ORDER — SUMATRIPTAN 100 MG/1
100 TABLET, FILM COATED ORAL
Qty: 12 TABLET | Refills: 4 | Status: SHIPPED | OUTPATIENT
Start: 2022-08-15 | End: 2022-08-15

## 2022-08-15 ASSESSMENT — PATIENT HEALTH QUESTIONNAIRE - PHQ9
SUM OF ALL RESPONSES TO PHQ QUESTIONS 1-9: 0
SUM OF ALL RESPONSES TO PHQ9 QUESTIONS 1 & 2: 0
1. LITTLE INTEREST OR PLEASURE IN DOING THINGS: 0
2. FEELING DOWN, DEPRESSED OR HOPELESS: 0
SUM OF ALL RESPONSES TO PHQ QUESTIONS 1-9: 0

## 2022-08-15 NOTE — TELEPHONE ENCOUNTER
Freddy Shaver is calling to request a refill on the following medication(s):    Last Visit Date (If Applicable):  2/91/0606    Next Visit Date:    8/15/2022    Medication Request:  Requested Prescriptions     Pending Prescriptions Disp Refills    SUMAtriptan (IMITREX) 100 MG tablet [Pharmacy Med Name: SUMATRIPTAN SUCC 100 MG TABLET] 12 tablet 4     Sig: TAKE 1 TABLET BY MOUTH ONCE AS NEEDED FOR MIGRAINE

## 2022-08-15 NOTE — PROGRESS NOTES
General FM note    Luma Olivo is a 58 y.o. female who presents today for follow up on her  medical conditions as noted below. Luma Olivo is c/o of   Chief Complaint   Patient presents with    Check-Up       Patient Active Problem List:     Intractable chronic migraine without aura and without status migrainosus     Essential hypertension     Past Medical History:   Diagnosis Date    Acid reflux     Anemia     Hypertension     Migraine     Mitral valve prolapse     Osteoarthritis       Past Surgical History:   Procedure Laterality Date     SECTION      COLONOSCOPY      COLONOSCOPY  2018    Screening; with 1 biopsy    COLONOSCOPY N/A 2018    COLONOSCOPY WITH BIOPSY performed by Mikayla Miller MD at [address] [place] [address] Ave, COLON, DIAGNOSTIC      OTHER SURGICAL HISTORY      uterine ablation    OVARY REMOVAL      TOE SURGERY       Family History   Problem Relation Age of Onset    Neuropathy Mother     Heart Attack Father      Current Outpatient Medications   Medication Sig Dispense Refill    SUMAtriptan (IMITREX) 100 MG tablet TAKE 1 TABLET BY MOUTH ONCE AS NEEDED FOR MIGRAINE 12 tablet 4    spironolactone (ALDACTONE) 50 MG tablet TAKE 1 TABLET BY MOUTH EVERY DAY 90 tablet 1    tiZANidine (ZANAFLEX) 2 MG tablet TAKE 1 TABLET BY MOUTH EVERY DAY AT NIGHT 90 tablet 1    losartan (COZAAR) 100 MG tablet TAKE 1 TABLET BY MOUTH EVERY DAY 90 tablet 1    butalbital-acetaminophen-caffeine (FIORICET, ESGIC) -40 MG per tablet Take 1 tablet by mouth every 4 hours as needed for Headaches 30 tablet 0    Ubrogepant 50 MG TABS Take 50 mg by mouth daily as needed (take if needed up to 4 tbs a day.) 30 tablet 1    RABEprazole (ACIPHEX) 20 MG tablet TAKE 1 TABLET BY MOUTH EVERY DAY 90 tablet 1    pravastatin (PRAVACHOL) 40 MG tablet TAKE 1 TABLET BY MOUTH EVERY DAY 90 tablet 1    Misc. Devices MISC 1 each by Does not apply route once for 1 dose Recommendation: 2 days off a week.  2 each 0     No current facility-administered medications for this visit. ALLERGIES:    Allergies   Allergen Reactions    Percocet [Oxycodone-Acetaminophen] Hives       Social History     Tobacco Use    Smoking status: Never    Smokeless tobacco: Never   Substance Use Topics    Alcohol use: No      Body mass index is 18.94 kg/m². /88   Pulse 75   Temp 97.7 °F (36.5 °C)   Ht 5' 5\" (1.651 m)   Wt 113 lb 12.8 oz (51.6 kg)   SpO2 100%   BMI 18.94 kg/m²     Subjective:      HPI    58 y.o. female coming today for blood pressure check. Patient also states that she did run 4 miles yesterday and she did quite well. She did get second place at her age group. She states that her left knee is very tender today. Hypertension  Patient is here for follow-up of elevated blood pressure. She is exercising and is adherent to a low-salt diet. Blood pressure is well controlled at home. Cardiac symptoms: none. Patient denies chest pain, claudication, exertional chest pressure/discomfort, fatigue, irregular heart beat, and lower extremity edema. Cardiovascular risk factors: hypertension. Use of agents associated with hypertension: none. History of target organ damage: none. Review of Systems   Constitutional: Negative for fever and unexpected weight change. Pertinent items are noted in HPI. Objective:   Physical Exam  Constitutional: VS (see above). General appearance: normal development, habitus and attention, no deformities. No distress. Eyes: normal conjunctiva and lids. CAV: RRR, no RMG. No edema lower extremities. Pulmo: CTA bilateral, no CWR. Skin: no rashes, lesions or ulcers. Musculoskeletal: normal gait. Nails: no clubbing or cyanosis. Psychiatric: alert and oriented to place, time and person. Normal mood and affect. Assessment:       Diagnosis Orders   1. Essential hypertension  Lipid Panel          Plan:   Patient's blood pressure is doing quite well. Discussed with her weight as she needs to keep up. She needs to keep snacking and eating  Call or return to clinic prn if these symptoms worsen or fail to improve as anticipated. I have reviewed the instructions with the patient, answering all questions to her satisfaction. No follow-ups on file. Orders Placed This Encounter   Procedures    Lipid Panel     Standing Status:   Future     Standing Expiration Date:   8/15/2023     Order Specific Question:   Is Patient Fasting?/# of Hours     Answer:   yes     No orders of the defined types were placed in this encounter. Call or return to clinic prn if these symptoms worsen or fail to improve as anticipated. I have reviewed the instructions with the patient, answering all questions to patient's satisfaction. Lala Soares received counseling on the following healthy behaviors: nutrition, exercise, and medication adherence  Reviewed prior labs and health maintenance. Continue current medications, diet and exercise. Discussed use, benefit, and side effects of prescribed medications. Barriers to medication compliance addressed. Patient given educational materials - see patient instructions. All patient questions answered. Patient voiced understanding.       Electronically signed by Aliyah Hodgson MD on 8/15/2022 at 1:12 PM       (Please note that portions of this note were completed with a voice recognition program. Efforts were made to edit the dictations but occasionally words are mis-transcribed.)

## 2022-08-22 NOTE — TELEPHONE ENCOUNTER
From: Debbie Damon  Sent: 9/12/2018 4:20 PM EDT  Subject: Medication Renewal Request    Debbie Damon would like a refill of the following medications:     clonazePAM (KLONOPIN) 0.25 MG disintegrating tablet Nir Isaacs MD]    Preferred pharmacy: [address] [address] [address] [phone]
To get better and follow your care plan as instructed.

## 2022-08-26 ENCOUNTER — HOSPITAL ENCOUNTER (OUTPATIENT)
Facility: CLINIC | Age: 62
Discharge: HOME | End: 2022-08-26
Payer: COMMERCIAL

## 2022-08-26 DIAGNOSIS — I10 ESSENTIAL HYPERTENSION: ICD-10-CM

## 2022-08-26 LAB
CHOLESTEROL/HDL RATIO: 3.8
CHOLESTEROL: 188 MG/DL
HDLC SERPL-MCNC: 49 MG/DL
LDL CHOLESTEROL: 121 MG/DL (ref 0–130)
TRIGL SERPL-MCNC: 88 MG/DL

## 2022-08-26 PROCEDURE — 36415 COLL VENOUS BLD VENIPUNCTURE: CPT

## 2022-08-26 PROCEDURE — 80061 LIPID PANEL: CPT

## 2022-11-21 RX ORDER — PRAVASTATIN SODIUM 40 MG
TABLET ORAL
Qty: 90 TABLET | Refills: 1 | Status: SHIPPED | OUTPATIENT
Start: 2022-11-21

## 2022-11-21 NOTE — TELEPHONE ENCOUNTER
Bethel Hair is calling to request a refill on the following medication(s):    Last Visit Date (If Applicable):  Visit date not found    Next Visit Date:    Visit date not found    Medication Request:  Requested Prescriptions     Pending Prescriptions Disp Refills    pravastatin (PRAVACHOL) 40 MG tablet [Pharmacy Med Name: PRAVASTATIN SODIUM 40 MG TAB] 90 tablet 1     Sig: TAKE 1 TABLET BY MOUTH EVERY DAY

## 2022-12-05 RX ORDER — RABEPRAZOLE SODIUM 20 MG/1
TABLET, DELAYED RELEASE ORAL
Qty: 90 TABLET | Refills: 1 | Status: SHIPPED | OUTPATIENT
Start: 2022-12-05

## 2022-12-13 ENCOUNTER — TELEPHONE (OUTPATIENT)
Dept: FAMILY MEDICINE CLINIC | Age: 62
End: 2022-12-13

## 2022-12-13 NOTE — TELEPHONE ENCOUNTER
Patient called in stating her stomach has been bothering her she stated that she has been having bowel movements but her stomach stills feel full as if she is not emptying her bowels all the way.       Please advise

## 2022-12-15 RX ORDER — LOSARTAN POTASSIUM 100 MG/1
TABLET ORAL
Qty: 90 TABLET | Refills: 1 | Status: SHIPPED | OUTPATIENT
Start: 2022-12-15

## 2022-12-15 NOTE — TELEPHONE ENCOUNTER
Patient stated that she goes everyday and its solid and she stated she does have stomach issues but they dont last that long jose luis this feels the same but it just does not feel right and she stated it may wake her up in the middle of night because its uncomfortable and hurts.       Please advise

## 2023-01-11 RX ORDER — SUMATRIPTAN 100 MG/1
100 TABLET, FILM COATED ORAL
Qty: 12 TABLET | Refills: 4 | Status: SHIPPED | OUTPATIENT
Start: 2023-01-11 | End: 2023-01-11

## 2023-01-11 RX ORDER — TIZANIDINE 2 MG/1
TABLET ORAL
Qty: 90 TABLET | Refills: 1 | Status: SHIPPED | OUTPATIENT
Start: 2023-01-11

## 2023-01-24 DIAGNOSIS — I10 ESSENTIAL HYPERTENSION: ICD-10-CM

## 2023-01-24 RX ORDER — SPIRONOLACTONE 50 MG/1
TABLET, FILM COATED ORAL
Qty: 90 TABLET | Refills: 1 | Status: SHIPPED | OUTPATIENT
Start: 2023-01-24

## 2023-01-27 ENCOUNTER — TELEMEDICINE (OUTPATIENT)
Dept: FAMILY MEDICINE CLINIC | Age: 63
End: 2023-01-27
Payer: COMMERCIAL

## 2023-01-27 DIAGNOSIS — K58.1 IRRITABLE BOWEL SYNDROME WITH CONSTIPATION: Primary | ICD-10-CM

## 2023-01-27 PROCEDURE — 99213 OFFICE O/P EST LOW 20 MIN: CPT | Performed by: FAMILY MEDICINE

## 2023-01-27 ASSESSMENT — PATIENT HEALTH QUESTIONNAIRE - PHQ9
1. LITTLE INTEREST OR PLEASURE IN DOING THINGS: 0
2. FEELING DOWN, DEPRESSED OR HOPELESS: 0
SUM OF ALL RESPONSES TO PHQ QUESTIONS 1-9: 0
SUM OF ALL RESPONSES TO PHQ QUESTIONS 1-9: 0
SUM OF ALL RESPONSES TO PHQ9 QUESTIONS 1 & 2: 0
SUM OF ALL RESPONSES TO PHQ QUESTIONS 1-9: 0
SUM OF ALL RESPONSES TO PHQ QUESTIONS 1-9: 0

## 2023-01-27 NOTE — PROGRESS NOTES
General FM note    TeleHealth encounter -- Audio/Visual (During  public health emergency)    Carmine Machuca is a 58 y.o. female who presents today for follow up on her  medical conditions as noted below.   Carmine Machuca is c/o of   Chief Complaint   Patient presents with    GI Problem     eulogio       Patient Active Problem List:     Intractable chronic migraine without aura and without status migrainosus     Essential hypertension     Past Medical History:   Diagnosis Date    Acid reflux     Anemia     Hypertension     Migraine     Mitral valve prolapse     Osteoarthritis       Past Surgical History:   Procedure Laterality Date     SECTION      COLONOSCOPY      COLONOSCOPY  2018    Screening; with 1 biopsy    COLONOSCOPY N/A 2018    COLONOSCOPY WITH BIOPSY performed by Lala Oliver MD at [facility], COLON, DIAGNOSTIC      OTHER SURGICAL HISTORY      uterine ablation    OVARY REMOVAL      TOE SURGERY       Family History   Problem Relation Age of Onset    Neuropathy Mother     Heart Attack Father      Current Outpatient Medications   Medication Sig Dispense Refill    linaCLOtide (LINZESS) 72 MCG CAPS capsule Take 1 capsule by mouth every morning (before breakfast) 30 capsule 3    spironolactone (ALDACTONE) 50 MG tablet TAKE 1 TABLET BY MOUTH EVERY DAY 90 tablet 1    tiZANidine (ZANAFLEX) 2 MG tablet TAKE 1 TABLET BY MOUTH EVERY DAY AT NIGHT 90 tablet 1    losartan (COZAAR) 100 MG tablet TAKE 1 TABLET BY MOUTH EVERY DAY 90 tablet 1    RABEprazole (ACIPHEX) 20 MG tablet TAKE 1 TABLET BY MOUTH EVERY DAY 90 tablet 1    pravastatin (PRAVACHOL) 40 MG tablet TAKE 1 TABLET BY MOUTH EVERY DAY 90 tablet 1    butalbital-acetaminophen-caffeine (FIORICET, ESGIC) -40 MG per tablet Take 1 tablet by mouth every 4 hours as needed for Headaches 30 tablet 0    Ubrogepant 50 MG TABS Take 50 mg by mouth daily as needed (take if needed up to 4 tbs a day.) 30 tablet 1    SUMAtriptan (IMITREX) 100 MG tablet TAKE 1 TABLET BY MOUTH ONCE AS NEEDED FOR MIGRAINE 12 tablet 4    Misc. Devices MISC 1 each by Does not apply route once for 1 dose Recommendation: 2 days off a week. 2 each 0     No current facility-administered medications for this visit. ALLERGIES:    Allergies   Allergen Reactions    Percocet [Oxycodone-Acetaminophen] Hives       Social History     Tobacco Use    Smoking status: Never    Smokeless tobacco: Never   Substance Use Topics    Alcohol use: No      There is no height or weight on file to calculate BMI. There were no vitals taken for this visit. Subjective:      HPI    58 y.o. female reaching out per tele med visit today. Stomach issues. Sunday night - she feels that it was gas very high and under her breast. Then she had a BM which helped. Did run a race on the weekend. And she feels that she really did fine but this morning she has not felt really good so she will not run today. She had bowel movement this morning but she still feels that she did not empty whole. She used fiber supplements multiple brands over-the-counter which did not help with the bowel movements. She is still on a probiotic. The gastroenterologist told her that she has a slow emptying of her stomach. She tried to use the Reglan but did not tolerate it. So she does not eat a lot of fibers. Her last colonoscopy was just recently was within normal limits. Review of Systems   Constitutional: Negative for fever and unexpected weight change. Objective:   Physical Exam  General appearance: normal development, habitus and attention, no deformities. No distress. Pulmo: normal breathing pattern. Psychiatric: alert and oriented to place, time and person. Normal mood and affect. Assessment:       Diagnosis Orders   1.  Irritable bowel syndrome with constipation            Plan:   Because of her bloating the feeling of not emptying after bowel movement I believe the patient has constipation. I will treat her as an IBS case with the Linzess. I told her to just try it and let me know if this works for her or not. Call or return to clinic prn if these symptoms worsen or fail to improve as anticipated. I have reviewed the instructions with the patient, answering all questions to her satisfaction. Gege Zayas is a 58 y.o. female being evaluated by a Virtual Visit (video visit) encounter to address concerns as mentioned above. A caregiver was present when appropriate. Due to this being a TeleHealth encounter (During JYTXB-97 public health emergency), evaluation of the following organ systems was limited: Vitals/Constitutional/EENT/Resp/CV/GI//MS/Neuro/Skin/Heme-Lymph-Imm. Pursuant to the emergency declaration under the [address] [place] [address], [address] [place] [address] authority and the [employer] Act, this Virtual Visit was conducted with patient's (and/or legal guardian's) consent, to reduce the patient's risk of exposure to COVID-19 and provide necessary medical care. The patient (and/or legal guardian) has also been advised to contact this office for worsening conditions or problems, and seek emergency medical treatment and/or call 911 if deemed necessary. Patient identification was verified at the start of the visit: Yes    Total time spent for this encounter: Not billed by time    Services were provided through a video synchronous discussion virtually to substitute for in-person clinic visit. Patient and provider were located at their individual homes. --Keanu Telles MD on 1/27/2023 at 8:07 AM    An electronic signature was used to authenticate this note. Return in about 6 months (around 7/27/2023), or if symptoms worsen or fail to improve. No orders of the defined types were placed in this encounter.     Orders Placed This Encounter   Medications    linaCLOtide (LINZESS) 72 MCG CAPS capsule Sig: Take 1 capsule by mouth every morning (before breakfast)     Dispense:  30 capsule     Refill:  3       (Please note that portions of this note were completed with a voice recognition program. Efforts were made to edit the dictations but occasionally words are mis-transcribed.)

## 2023-05-02 ENCOUNTER — OFFICE VISIT (OUTPATIENT)
Dept: FAMILY MEDICINE CLINIC | Age: 63
End: 2023-05-02
Payer: COMMERCIAL

## 2023-05-02 VITALS
SYSTOLIC BLOOD PRESSURE: 138 MMHG | WEIGHT: 112.4 LBS | DIASTOLIC BLOOD PRESSURE: 87 MMHG | TEMPERATURE: 98.2 F | BODY MASS INDEX: 18.7 KG/M2 | HEART RATE: 74 BPM | OXYGEN SATURATION: 100 %

## 2023-05-02 DIAGNOSIS — Z12.31 ENCOUNTER FOR SCREENING MAMMOGRAM FOR MALIGNANT NEOPLASM OF BREAST: ICD-10-CM

## 2023-05-02 DIAGNOSIS — K59.04 CHRONIC IDIOPATHIC CONSTIPATION: ICD-10-CM

## 2023-05-02 DIAGNOSIS — G89.29 CHRONIC INTRACTABLE HEADACHE, UNSPECIFIED HEADACHE TYPE: Primary | ICD-10-CM

## 2023-05-02 DIAGNOSIS — R51.9 CHRONIC INTRACTABLE HEADACHE, UNSPECIFIED HEADACHE TYPE: Primary | ICD-10-CM

## 2023-05-02 PROCEDURE — 3079F DIAST BP 80-89 MM HG: CPT | Performed by: FAMILY MEDICINE

## 2023-05-02 PROCEDURE — 3075F SYST BP GE 130 - 139MM HG: CPT | Performed by: FAMILY MEDICINE

## 2023-05-02 PROCEDURE — 99213 OFFICE O/P EST LOW 20 MIN: CPT | Performed by: FAMILY MEDICINE

## 2023-05-02 RX ORDER — VENLAFAXINE HYDROCHLORIDE 37.5 MG/1
37.5 CAPSULE, EXTENDED RELEASE ORAL DAILY
Qty: 30 CAPSULE | Refills: 3 | Status: SHIPPED | OUTPATIENT
Start: 2023-05-02

## 2023-05-02 RX ORDER — CYCLOBENZAPRINE HCL 5 MG
5 TABLET ORAL NIGHTLY PRN
Qty: 30 TABLET | Refills: 0 | Status: SHIPPED | OUTPATIENT
Start: 2023-05-02 | End: 2023-05-12

## 2023-05-02 RX ORDER — TOPIRAMATE 25 MG/1
25 TABLET ORAL 2 TIMES DAILY
Qty: 60 TABLET | Refills: 3 | Status: SHIPPED | OUTPATIENT
Start: 2023-05-02

## 2023-05-02 SDOH — ECONOMIC STABILITY: FOOD INSECURITY: WITHIN THE PAST 12 MONTHS, YOU WORRIED THAT YOUR FOOD WOULD RUN OUT BEFORE YOU GOT MONEY TO BUY MORE.: NEVER TRUE

## 2023-05-02 SDOH — ECONOMIC STABILITY: HOUSING INSECURITY
IN THE LAST 12 MONTHS, WAS THERE A TIME WHEN YOU DID NOT HAVE A STEADY PLACE TO SLEEP OR SLEPT IN A SHELTER (INCLUDING NOW)?: NO

## 2023-05-02 SDOH — ECONOMIC STABILITY: FOOD INSECURITY: WITHIN THE PAST 12 MONTHS, THE FOOD YOU BOUGHT JUST DIDN'T LAST AND YOU DIDN'T HAVE MONEY TO GET MORE.: NEVER TRUE

## 2023-05-02 SDOH — ECONOMIC STABILITY: INCOME INSECURITY: HOW HARD IS IT FOR YOU TO PAY FOR THE VERY BASICS LIKE FOOD, HOUSING, MEDICAL CARE, AND HEATING?: NOT HARD AT ALL

## 2023-05-02 ASSESSMENT — PATIENT HEALTH QUESTIONNAIRE - PHQ9
SUM OF ALL RESPONSES TO PHQ9 QUESTIONS 1 & 2: 0
SUM OF ALL RESPONSES TO PHQ QUESTIONS 1-9: 0
2. FEELING DOWN, DEPRESSED OR HOPELESS: 0
1. LITTLE INTEREST OR PLEASURE IN DOING THINGS: 0
SUM OF ALL RESPONSES TO PHQ QUESTIONS 1-9: 0

## 2023-05-02 NOTE — PROGRESS NOTES
General FM note    Alma Inman is a 58 y.o. female who presents today for follow up on her  medical conditions as noted below.   Alma Inman is c/o of   Chief Complaint   Patient presents with    Migraine       Patient Active Problem List:     Intractable chronic migraine without aura and without status migrainosus     Essential hypertension     Past Medical History:   Diagnosis Date    Acid reflux     Anemia     Hypertension     Migraine     Mitral valve prolapse     Osteoarthritis       Past Surgical History:   Procedure Laterality Date     SECTION      COLONOSCOPY      COLONOSCOPY  2018    Screening; with 1 biopsy    COLONOSCOPY N/A 2018    COLONOSCOPY WITH BIOPSY performed by Crescencio Huber MD at [facility], COLON, DIAGNOSTIC      OTHER SURGICAL HISTORY      uterine ablation    OVARY REMOVAL      TOE SURGERY       Family History   Problem Relation Age of Onset    Neuropathy Mother     Heart Attack Father      Current Outpatient Medications   Medication Sig Dispense Refill    topiramate (TOPAMAX) 25 MG tablet Take 1 tablet by mouth 2 times daily 60 tablet 3    venlafaxine (EFFEXOR XR) 37.5 MG extended release capsule Take 1 capsule by mouth daily 30 capsule 3    cyclobenzaprine (FLEXERIL) 5 MG tablet Take 1 tablet by mouth nightly as needed for Muscle spasms 30 tablet 0    linaCLOtide (LINZESS) 72 MCG CAPS capsule Take 1 capsule by mouth every morning (before breakfast) 30 capsule 3    spironolactone (ALDACTONE) 50 MG tablet TAKE 1 TABLET BY MOUTH EVERY DAY 90 tablet 1    tiZANidine (ZANAFLEX) 2 MG tablet TAKE 1 TABLET BY MOUTH EVERY DAY AT NIGHT 90 tablet 1    losartan (COZAAR) 100 MG tablet TAKE 1 TABLET BY MOUTH EVERY DAY 90 tablet 1    RABEprazole (ACIPHEX) 20 MG tablet TAKE 1 TABLET BY MOUTH EVERY DAY 90 tablet 1    pravastatin (PRAVACHOL) 40 MG tablet TAKE 1 TABLET BY MOUTH EVERY DAY 90 tablet 1    butalbital-acetaminophen-caffeine (FIORICET, ESGIC) -40 MG

## 2023-05-08 ENCOUNTER — PATIENT MESSAGE (OUTPATIENT)
Dept: FAMILY MEDICINE CLINIC | Age: 63
End: 2023-05-08

## 2023-05-08 RX ORDER — PREDNISONE 20 MG/1
TABLET ORAL
Qty: 30 TABLET | Refills: 0 | Status: SHIPPED | OUTPATIENT
Start: 2023-05-08 | End: 2023-05-18

## 2023-05-08 NOTE — TELEPHONE ENCOUNTER
From: Leonila Torres  To: Dr. Cristi Brown  Sent: 5/8/2023 8:35 AM EDT  Subject: Headaches     I am still experiencing headaches every day. Years ago I used a steroid taper to break the cycle and thought maybe it could help me now.

## 2023-05-19 RX ORDER — PRAVASTATIN SODIUM 40 MG
TABLET ORAL
Qty: 90 TABLET | Refills: 1 | Status: SHIPPED | OUTPATIENT
Start: 2023-05-19

## 2023-05-24 DIAGNOSIS — G89.29 CHRONIC INTRACTABLE HEADACHE, UNSPECIFIED HEADACHE TYPE: ICD-10-CM

## 2023-05-24 DIAGNOSIS — R51.9 CHRONIC INTRACTABLE HEADACHE, UNSPECIFIED HEADACHE TYPE: ICD-10-CM

## 2023-05-24 RX ORDER — TOPIRAMATE 25 MG/1
TABLET ORAL
Qty: 60 TABLET | Refills: 3 | Status: SHIPPED | OUTPATIENT
Start: 2023-05-24

## 2023-05-25 RX ORDER — VENLAFAXINE HYDROCHLORIDE 37.5 MG/1
CAPSULE, EXTENDED RELEASE ORAL
Qty: 30 CAPSULE | Refills: 3 | Status: SHIPPED | OUTPATIENT
Start: 2023-05-25

## 2023-05-30 ENCOUNTER — TELEPHONE (OUTPATIENT)
Dept: FAMILY MEDICINE CLINIC | Age: 63
End: 2023-05-30

## 2023-06-05 RX ORDER — SUMATRIPTAN 100 MG/1
100 TABLET, FILM COATED ORAL
Qty: 12 TABLET | Refills: 4 | Status: SHIPPED | OUTPATIENT
Start: 2023-06-05 | End: 2023-06-05

## 2023-06-05 RX ORDER — RABEPRAZOLE SODIUM 20 MG/1
TABLET, DELAYED RELEASE ORAL
Qty: 90 TABLET | Refills: 1 | Status: SHIPPED | OUTPATIENT
Start: 2023-06-05

## 2023-06-05 NOTE — TELEPHONE ENCOUNTER
Miguel Rojas is calling to request a refill on the following medication(s):    Last Visit Date (If Applicable):  8/5/3701    Next Visit Date:    6/5/2023    Medication Request:  Requested Prescriptions     Pending Prescriptions Disp Refills    RABEprazole (ACIPHEX) 20 MG tablet [Pharmacy Med Name: RABEPRAZOLE SOD DR 20 MG TAB] 90 tablet 1     Sig: TAKE 1 TABLET BY MOUTH EVERY DAY

## 2023-06-07 RX ORDER — LOSARTAN POTASSIUM 100 MG/1
TABLET ORAL
Qty: 90 TABLET | Refills: 1 | Status: SHIPPED | OUTPATIENT
Start: 2023-06-07

## 2023-06-07 NOTE — TELEPHONE ENCOUNTER
Lillie Stevens is calling to request a refill on the following medication(s):    Last Visit Date (If Applicable):  9/9/6176    Next Visit Date:    6/9/2023    Medication Request:  Requested Prescriptions     Pending Prescriptions Disp Refills    losartan (COZAAR) 100 MG tablet [Pharmacy Med Name: LOSARTAN POTASSIUM 100 MG TAB] 90 tablet 1     Sig: TAKE 1 TABLET BY MOUTH EVERY DAY Detail Level: None Bill For Individual Tests Below?: no Venipuncture Paragraph: An alcohol pad was applied to the venipuncture site. Venipuncture was performed using a 21 gauge needle. Pressure and a bandaid was applied to the site. No complications were noted. Number Of Tubes Drawn: 1

## 2023-06-09 ENCOUNTER — TELEMEDICINE (OUTPATIENT)
Dept: FAMILY MEDICINE CLINIC | Age: 63
End: 2023-06-09
Payer: COMMERCIAL

## 2023-06-09 DIAGNOSIS — G43.719 INTRACTABLE CHRONIC MIGRAINE WITHOUT AURA AND WITHOUT STATUS MIGRAINOSUS: Primary | ICD-10-CM

## 2023-06-09 PROCEDURE — 99213 OFFICE O/P EST LOW 20 MIN: CPT | Performed by: FAMILY MEDICINE

## 2023-06-09 RX ORDER — CLONAZEPAM 0.5 MG/1
0.5 TABLET ORAL NIGHTLY PRN
Qty: 30 TABLET | Refills: 0 | Status: SHIPPED | OUTPATIENT
Start: 2023-06-09 | End: 2023-07-09

## 2023-06-09 ASSESSMENT — PATIENT HEALTH QUESTIONNAIRE - PHQ9
SUM OF ALL RESPONSES TO PHQ QUESTIONS 1-9: 0
SUM OF ALL RESPONSES TO PHQ QUESTIONS 1-9: 0
SUM OF ALL RESPONSES TO PHQ9 QUESTIONS 1 & 2: 0
1. LITTLE INTEREST OR PLEASURE IN DOING THINGS: 0
2. FEELING DOWN, DEPRESSED OR HOPELESS: 0
SUM OF ALL RESPONSES TO PHQ QUESTIONS 1-9: 0
SUM OF ALL RESPONSES TO PHQ QUESTIONS 1-9: 0

## 2023-06-09 NOTE — PROGRESS NOTES
State): [place]         Total time spent for this encounter: Not billed by time    --Luma Guzman MD on 6/9/2023 at 8:48 AM    An electronic signature was used to authenticate this note. Return in about 3 months (around 9/9/2023), or if symptoms worsen or fail to improve. No orders of the defined types were placed in this encounter. Orders Placed This Encounter   Medications    clonazePAM (KLONOPIN) 0.5 MG tablet     Sig: Take 1 tablet by mouth nightly as needed for Anxiety for up to 30 days.  Max Daily Amount: 0.5 mg     Dispense:  30 tablet     Refill:  0    verapamil (CALAN SR) 120 MG extended release tablet     Sig: Take 1 tablet by mouth daily     Dispense:  30 tablet     Refill:  5       (Please note that portions of this note were completed with a voice recognition program. Efforts were made to edit the dictations but occasionally words are mis-transcribed.)

## 2023-07-03 NOTE — TELEPHONE ENCOUNTER
Javier Junior is calling to request a refill on the following medication(s):    Last Visit Date (If Applicable):  9/4/8444    Next Visit Date:    9/11/2023    Medication Request:  Requested Prescriptions     Pending Prescriptions Disp Refills    verapamil (CALAN SR) 120 MG extended release tablet [Pharmacy Med Name: VERAPAMIL  MG TABLET] 30 tablet 5     Sig: TAKE 1 TABLET BY MOUTH EVERY DAY

## 2023-07-05 DIAGNOSIS — G43.719 INTRACTABLE CHRONIC MIGRAINE WITHOUT AURA AND WITHOUT STATUS MIGRAINOSUS: ICD-10-CM

## 2023-07-05 RX ORDER — CLONAZEPAM 0.5 MG/1
0.5 TABLET ORAL NIGHTLY PRN
Qty: 30 TABLET | Refills: 0 | Status: SHIPPED | OUTPATIENT
Start: 2023-07-05 | End: 2023-08-04

## 2023-07-10 RX ORDER — TIZANIDINE 2 MG/1
TABLET ORAL
Qty: 90 TABLET | Refills: 1 | Status: SHIPPED | OUTPATIENT
Start: 2023-07-10

## 2023-07-27 ENCOUNTER — OFFICE VISIT (OUTPATIENT)
Dept: FAMILY MEDICINE CLINIC | Age: 63
End: 2023-07-27
Payer: COMMERCIAL

## 2023-07-27 VITALS
HEART RATE: 64 BPM | DIASTOLIC BLOOD PRESSURE: 84 MMHG | WEIGHT: 112.2 LBS | SYSTOLIC BLOOD PRESSURE: 144 MMHG | TEMPERATURE: 97.8 F | OXYGEN SATURATION: 97 % | BODY MASS INDEX: 18.67 KG/M2

## 2023-07-27 DIAGNOSIS — I10 ESSENTIAL HYPERTENSION: Primary | ICD-10-CM

## 2023-07-27 DIAGNOSIS — G43.719 INTRACTABLE CHRONIC MIGRAINE WITHOUT AURA AND WITHOUT STATUS MIGRAINOSUS: ICD-10-CM

## 2023-07-27 PROCEDURE — 3079F DIAST BP 80-89 MM HG: CPT | Performed by: FAMILY MEDICINE

## 2023-07-27 PROCEDURE — 99214 OFFICE O/P EST MOD 30 MIN: CPT | Performed by: FAMILY MEDICINE

## 2023-07-27 PROCEDURE — 3077F SYST BP >= 140 MM HG: CPT | Performed by: FAMILY MEDICINE

## 2023-07-27 RX ORDER — CLONAZEPAM 0.5 MG/1
0.5 TABLET ORAL 2 TIMES DAILY PRN
Qty: 60 TABLET | Refills: 1 | Status: SHIPPED | OUTPATIENT
Start: 2023-07-27 | End: 2023-08-26

## 2023-07-27 SDOH — ECONOMIC STABILITY: FOOD INSECURITY: WITHIN THE PAST 12 MONTHS, THE FOOD YOU BOUGHT JUST DIDN'T LAST AND YOU DIDN'T HAVE MONEY TO GET MORE.: NEVER TRUE

## 2023-07-27 SDOH — ECONOMIC STABILITY: INCOME INSECURITY: HOW HARD IS IT FOR YOU TO PAY FOR THE VERY BASICS LIKE FOOD, HOUSING, MEDICAL CARE, AND HEATING?: NOT HARD AT ALL

## 2023-07-27 SDOH — ECONOMIC STABILITY: FOOD INSECURITY: WITHIN THE PAST 12 MONTHS, YOU WORRIED THAT YOUR FOOD WOULD RUN OUT BEFORE YOU GOT MONEY TO BUY MORE.: NEVER TRUE

## 2023-07-27 ASSESSMENT — PATIENT HEALTH QUESTIONNAIRE - PHQ9
2. FEELING DOWN, DEPRESSED OR HOPELESS: 0
SUM OF ALL RESPONSES TO PHQ QUESTIONS 1-9: 0
1. LITTLE INTEREST OR PLEASURE IN DOING THINGS: 0
SUM OF ALL RESPONSES TO PHQ9 QUESTIONS 1 & 2: 0
SUM OF ALL RESPONSES TO PHQ QUESTIONS 1-9: 0

## 2023-07-27 NOTE — PROGRESS NOTES
maintenance. Continue current medications, diet and exercise. Discussed use, benefit, and side effects of prescribed medications. Barriers to medication compliance addressed. Patient given educational materials - see patient instructions. All patient questions answered. Patient voiced understanding.       Electronically signed by Nadine Sharma MD on 7/27/2023 at 12:31 PM       (Please note that portions of this note were completed with a voice recognition program. Efforts were made to edit the dictations but occasionally words are mis-transcribed.)

## 2023-07-28 ENCOUNTER — HOSPITAL ENCOUNTER (OUTPATIENT)
Facility: CLINIC | Age: 63
Discharge: HOME | End: 2023-07-28
Payer: COMMERCIAL

## 2023-07-28 DIAGNOSIS — I10 ESSENTIAL HYPERTENSION: ICD-10-CM

## 2023-07-28 DIAGNOSIS — G43.719 INTRACTABLE CHRONIC MIGRAINE WITHOUT AURA AND WITHOUT STATUS MIGRAINOSUS: ICD-10-CM

## 2023-07-28 LAB
ALBUMIN SERPL-MCNC: 4.2 G/DL (ref 3.5–5.2)
ALBUMIN/GLOB SERPL: 1.7 {RATIO} (ref 1–2.5)
ALP SERPL-CCNC: 52 U/L (ref 35–104)
ALT SERPL-CCNC: 23 U/L (ref 5–33)
ANION GAP SERPL CALCULATED.3IONS-SCNC: 14 MMOL/L (ref 9–17)
AST SERPL-CCNC: 21 U/L
BILIRUB SERPL-MCNC: 0.5 MG/DL (ref 0.3–1.2)
BUN SERPL-MCNC: 18 MG/DL (ref 8–23)
CALCIUM SERPL-MCNC: 9.4 MG/DL (ref 8.6–10.4)
CHLORIDE SERPL-SCNC: 106 MMOL/L (ref 98–107)
CHOLEST SERPL-MCNC: 189 MG/DL
CHOLESTEROL/HDL RATIO: 3.4
CO2 SERPL-SCNC: 22 MMOL/L (ref 20–31)
CREAT SERPL-MCNC: 0.8 MG/DL (ref 0.5–0.9)
ERYTHROCYTE [DISTWIDTH] IN BLOOD BY AUTOMATED COUNT: 12.4 % (ref 11.8–14.4)
GFR SERPL CREATININE-BSD FRML MDRD: >60 ML/MIN/1.73M2
GLUCOSE SERPL-MCNC: 94 MG/DL (ref 70–99)
HCT VFR BLD AUTO: 41.3 % (ref 36.3–47.1)
HDLC SERPL-MCNC: 55 MG/DL
HGB BLD-MCNC: 14.1 G/DL (ref 11.9–15.1)
LDLC SERPL CALC-MCNC: 119 MG/DL (ref 0–130)
MCH RBC QN AUTO: 32.5 PG (ref 25.2–33.5)
MCHC RBC AUTO-ENTMCNC: 34.1 G/DL (ref 28.4–34.8)
MCV RBC AUTO: 95.2 FL (ref 82.6–102.9)
NRBC BLD-RTO: 0 PER 100 WBC
PLATELET # BLD AUTO: 293 K/UL (ref 138–453)
PMV BLD AUTO: 10.5 FL (ref 8.1–13.5)
POTASSIUM SERPL-SCNC: 4 MMOL/L (ref 3.7–5.3)
PROT SERPL-MCNC: 6.7 G/DL (ref 6.4–8.3)
RBC # BLD AUTO: 4.34 M/UL (ref 3.95–5.11)
SODIUM SERPL-SCNC: 142 MMOL/L (ref 135–144)
TRIGL SERPL-MCNC: 74 MG/DL
WBC OTHER # BLD: 7.9 K/UL (ref 3.5–11.3)

## 2023-07-28 PROCEDURE — 80061 LIPID PANEL: CPT

## 2023-07-28 PROCEDURE — 36415 COLL VENOUS BLD VENIPUNCTURE: CPT

## 2023-07-28 PROCEDURE — 85027 COMPLETE CBC AUTOMATED: CPT

## 2023-07-28 PROCEDURE — 80053 COMPREHEN METABOLIC PANEL: CPT

## 2023-08-01 ENCOUNTER — PATIENT MESSAGE (OUTPATIENT)
Dept: FAMILY MEDICINE CLINIC | Age: 63
End: 2023-08-01

## 2023-09-11 ENCOUNTER — OFFICE VISIT (OUTPATIENT)
Dept: FAMILY MEDICINE CLINIC | Age: 63
End: 2023-09-11
Payer: COMMERCIAL

## 2023-09-11 VITALS
TEMPERATURE: 97.3 F | HEIGHT: 65 IN | BODY MASS INDEX: 18.33 KG/M2 | HEART RATE: 91 BPM | OXYGEN SATURATION: 100 % | SYSTOLIC BLOOD PRESSURE: 137 MMHG | DIASTOLIC BLOOD PRESSURE: 88 MMHG | WEIGHT: 110 LBS

## 2023-09-11 DIAGNOSIS — I10 ESSENTIAL HYPERTENSION: Primary | ICD-10-CM

## 2023-09-11 DIAGNOSIS — F41.9 ANXIETY: ICD-10-CM

## 2023-09-11 DIAGNOSIS — Z12.31 ENCOUNTER FOR SCREENING MAMMOGRAM FOR MALIGNANT NEOPLASM OF BREAST: ICD-10-CM

## 2023-09-11 PROCEDURE — 99213 OFFICE O/P EST LOW 20 MIN: CPT | Performed by: FAMILY MEDICINE

## 2023-09-11 PROCEDURE — 3079F DIAST BP 80-89 MM HG: CPT | Performed by: FAMILY MEDICINE

## 2023-09-11 PROCEDURE — 3075F SYST BP GE 130 - 139MM HG: CPT | Performed by: FAMILY MEDICINE

## 2023-09-11 NOTE — PROGRESS NOTES
General FM note    Jovani Eli is a 61 y.o. female who presents today for follow up on her  medical conditions as noted below.   Jovani Eli is c/o of   Chief Complaint   Patient presents with    Anxiety       Patient Active Problem List:     Intractable chronic migraine without aura and without status migrainosus     Essential hypertension     Past Medical History:   Diagnosis Date    Acid reflux     Anemia     Hypertension     Migraine     Mitral valve prolapse     Osteoarthritis       Past Surgical History:   Procedure Laterality Date     SECTION      COLONOSCOPY      COLONOSCOPY  2018    Screening; with 1 biopsy    COLONOSCOPY N/A 2018    COLONOSCOPY WITH BIOPSY performed by Alexa Rodriguez MD at [address] [facility], COLON, DIAGNOSTIC      OTHER SURGICAL HISTORY      uterine ablation    OVARY REMOVAL      TOE SURGERY       Family History   Problem Relation Age of Onset    Neuropathy Mother     Heart Attack Father      Current Outpatient Medications   Medication Sig Dispense Refill    ondansetron (ZOFRAN-ODT) 4 MG disintegrating tablet Take 1 tablet by mouth 3 times daily as needed for Nausea or Vomiting 21 tablet 0    tiZANidine (ZANAFLEX) 2 MG tablet TAKE 1 TABLET BY MOUTH EVERY DAY AT NIGHT 90 tablet 1    verapamil (CALAN SR) 120 MG extended release tablet TAKE 1 TABLET BY MOUTH EVERY DAY 30 tablet 5    losartan (COZAAR) 100 MG tablet TAKE 1 TABLET BY MOUTH EVERY DAY (Patient taking differently: 0.5 tablets) 90 tablet 1    RABEprazole (ACIPHEX) 20 MG tablet TAKE 1 TABLET BY MOUTH EVERY DAY 90 tablet 1    SUMAtriptan (IMITREX) 100 MG tablet TAKE 1 TABLET BY MOUTH ONCE AS NEEDED FOR MIGRAINE 12 tablet 4    pravastatin (PRAVACHOL) 40 MG tablet TAKE 1 TABLET BY MOUTH EVERY DAY 90 tablet 1    butalbital-acetaminophen-caffeine (FIORICET, ESGIC) -40 MG per tablet Take 1 tablet by mouth every 4 hours as needed for Headaches 30 tablet 0    Ubrogepant 50 MG TABS Take 50 mg by

## 2023-10-11 ENCOUNTER — TELEPHONE (OUTPATIENT)
Dept: FAMILY MEDICINE CLINIC | Age: 63
End: 2023-10-11

## 2023-10-11 NOTE — TELEPHONE ENCOUNTER
Patient is calling stating that [facility] has sent over orders so patient can get medication sent there but [facility] has not received anything back from our office. Please advise.

## 2023-10-12 DIAGNOSIS — I10 ESSENTIAL HYPERTENSION: ICD-10-CM

## 2023-10-12 RX ORDER — RABEPRAZOLE SODIUM 20 MG/1
20 TABLET, DELAYED RELEASE ORAL DAILY
Qty: 90 TABLET | Refills: 1 | Status: SHIPPED | OUTPATIENT
Start: 2023-10-12

## 2023-10-12 RX ORDER — PRAVASTATIN SODIUM 40 MG
40 TABLET ORAL DAILY
Qty: 90 TABLET | Refills: 1 | Status: SHIPPED | OUTPATIENT
Start: 2023-10-12

## 2023-10-12 RX ORDER — SPIRONOLACTONE 50 MG/1
50 TABLET, FILM COATED ORAL DAILY
Qty: 90 TABLET | Refills: 1 | Status: SHIPPED | OUTPATIENT
Start: 2023-10-12

## 2023-10-13 DIAGNOSIS — G89.29 CHRONIC INTRACTABLE HEADACHE, UNSPECIFIED HEADACHE TYPE: ICD-10-CM

## 2023-10-13 DIAGNOSIS — R51.9 CHRONIC INTRACTABLE HEADACHE, UNSPECIFIED HEADACHE TYPE: ICD-10-CM

## 2023-10-13 RX ORDER — BUTALBITAL, ACETAMINOPHEN AND CAFFEINE 50; 325; 40 MG/1; MG/1; MG/1
1 TABLET ORAL EVERY 4 HOURS PRN
Qty: 90 TABLET | Refills: 0 | Status: SHIPPED | OUTPATIENT
Start: 2023-10-13

## 2023-10-13 RX ORDER — LOSARTAN POTASSIUM 100 MG/1
50 TABLET ORAL DAILY
Qty: 90 TABLET | Refills: 1 | Status: SHIPPED | OUTPATIENT
Start: 2023-10-13

## 2023-10-13 RX ORDER — SUMATRIPTAN 100 MG/1
100 TABLET, FILM COATED ORAL DAILY PRN
Qty: 12 TABLET | Refills: 3 | Status: SHIPPED | OUTPATIENT
Start: 2023-10-13

## 2023-10-13 NOTE — TELEPHONE ENCOUNTER
Has been having horrible headaches , requesting a refill to [facility].      Harrison Deepak is calling to request a refill on the following medication(s):    Last Visit Date (If Applicable):  7/74/4348    Next Visit Date:    12/11/2023    Medication Request:  Requested Prescriptions     Pending Prescriptions Disp Refills    butalbital-acetaminophen-caffeine (FIORICET, ESGIC) -40 MG per tablet 30 tablet 0     Sig: Take 1 tablet by mouth every 4 hours as needed for Headaches

## 2023-10-18 DIAGNOSIS — R51.9 CHRONIC INTRACTABLE HEADACHE, UNSPECIFIED HEADACHE TYPE: ICD-10-CM

## 2023-10-18 DIAGNOSIS — G89.29 CHRONIC INTRACTABLE HEADACHE, UNSPECIFIED HEADACHE TYPE: ICD-10-CM

## 2023-10-18 RX ORDER — SUMATRIPTAN 100 MG/1
100 TABLET, FILM COATED ORAL DAILY PRN
Qty: 12 TABLET | Refills: 3 | Status: SHIPPED | OUTPATIENT
Start: 2023-10-18

## 2023-10-18 RX ORDER — BUTALBITAL, ACETAMINOPHEN AND CAFFEINE 50; 325; 40 MG/1; MG/1; MG/1
1 TABLET ORAL EVERY 4 HOURS PRN
Qty: 90 TABLET | Refills: 0 | Status: SHIPPED | OUTPATIENT
Start: 2023-10-18

## 2023-10-18 NOTE — TELEPHONE ENCOUNTER
Ileana Birmingham is calling to request a refill on the following medication(s):    Last Visit Date (If Applicable):  2/66/0214    Next Visit Date:    12/11/2023    Medication Request:  Requested Prescriptions     Pending Prescriptions Disp Refills    butalbital-acetaminophen-caffeine (FIORICET, ESGIC) -40 MG per tablet 90 tablet 0     Sig: Take 1 tablet by mouth every 4 hours as needed for Headaches

## 2023-10-18 NOTE — TELEPHONE ENCOUNTER
Saumya Murphy is calling to request a refill on the following medication(s):    Last Visit Date (If Applicable):  3/18/0989    Next Visit Date:    12/11/2023    Medication Request:  Requested Prescriptions     Pending Prescriptions Disp Refills    butalbital-acetaminophen-caffeine (FIORICET, ESGIC) -40 MG per tablet 90 tablet 0     Sig: Take 1 tablet by mouth every 4 hours as needed for Headaches    SUMAtriptan (IMITREX) 100 MG tablet 12 tablet 3     Sig: Take 1 tablet by mouth daily as needed for Migraine             Patient stated the [facility] could not refill the medication due to the medication being a controlled substance     Please advise

## 2023-11-03 DIAGNOSIS — G43.719 INTRACTABLE CHRONIC MIGRAINE WITHOUT AURA AND WITHOUT STATUS MIGRAINOSUS: ICD-10-CM

## 2023-11-03 NOTE — TELEPHONE ENCOUNTER
Mp Bernal is calling to request a refill on the following medication(s):    Last Visit Date (If Applicable):  9/94/7876    Next Visit Date:    12/11/2023    Medication Request:  Requested Prescriptions     Pending Prescriptions Disp Refills    clonazePAM (KLONOPIN) 0.5 MG tablet 60 tablet 0     Sig: Take 1 tablet by mouth 2 times daily as needed for Anxiety for up to 30 days.  Max Daily Amount: 1 mg

## 2023-11-06 RX ORDER — CLONAZEPAM 0.5 MG/1
0.5 TABLET ORAL 2 TIMES DAILY PRN
Qty: 60 TABLET | Refills: 0 | Status: SHIPPED | OUTPATIENT
Start: 2023-11-06 | End: 2023-12-06

## 2023-11-30 DIAGNOSIS — G43.719 INTRACTABLE CHRONIC MIGRAINE WITHOUT AURA AND WITHOUT STATUS MIGRAINOSUS: ICD-10-CM

## 2023-11-30 RX ORDER — CLONAZEPAM 0.5 MG/1
0.5 TABLET ORAL 2 TIMES DAILY PRN
Qty: 60 TABLET | Refills: 0 | Status: SHIPPED | OUTPATIENT
Start: 2023-11-30 | End: 2023-12-30

## 2023-11-30 NOTE — TELEPHONE ENCOUNTER
Valeriy Benavidez is calling to request a refill on the following medication(s):    Last Visit Date (If Applicable):  6/67/8634    Next Visit Date:    12/7/2023    Medication Request:  Requested Prescriptions     Pending Prescriptions Disp Refills    clonazePAM (KLONOPIN) 0.5 MG tablet 60 tablet 0     Sig: Take 1 tablet by mouth 2 times daily as needed for Anxiety for up to 30 days.  Max Daily Amount: 1 mg

## 2023-12-07 ENCOUNTER — OFFICE VISIT (OUTPATIENT)
Dept: FAMILY MEDICINE CLINIC | Age: 63
End: 2023-12-07
Payer: COMMERCIAL

## 2023-12-07 ENCOUNTER — TELEPHONE (OUTPATIENT)
Dept: FAMILY MEDICINE CLINIC | Age: 63
End: 2023-12-07

## 2023-12-07 VITALS
SYSTOLIC BLOOD PRESSURE: 138 MMHG | OXYGEN SATURATION: 100 % | BODY MASS INDEX: 18.14 KG/M2 | TEMPERATURE: 97.3 F | HEART RATE: 70 BPM | WEIGHT: 109 LBS | DIASTOLIC BLOOD PRESSURE: 88 MMHG

## 2023-12-07 DIAGNOSIS — F41.9 ANXIETY: Primary | ICD-10-CM

## 2023-12-07 DIAGNOSIS — I34.0 NONRHEUMATIC MITRAL VALVE REGURGITATION: ICD-10-CM

## 2023-12-07 PROCEDURE — 99214 OFFICE O/P EST MOD 30 MIN: CPT | Performed by: FAMILY MEDICINE

## 2023-12-07 PROCEDURE — 3075F SYST BP GE 130 - 139MM HG: CPT | Performed by: FAMILY MEDICINE

## 2023-12-07 PROCEDURE — 3079F DIAST BP 80-89 MM HG: CPT | Performed by: FAMILY MEDICINE

## 2023-12-07 RX ORDER — DULOXETIN HYDROCHLORIDE 20 MG/1
20 CAPSULE, DELAYED RELEASE ORAL DAILY
Qty: 30 CAPSULE | Refills: 3 | Status: SHIPPED | OUTPATIENT
Start: 2023-12-07

## 2023-12-07 ASSESSMENT — PATIENT HEALTH QUESTIONNAIRE - PHQ9
SUM OF ALL RESPONSES TO PHQ QUESTIONS 1-9: 0
2. FEELING DOWN, DEPRESSED OR HOPELESS: 0
SUM OF ALL RESPONSES TO PHQ9 QUESTIONS 1 & 2: 0
SUM OF ALL RESPONSES TO PHQ QUESTIONS 1-9: 0
SUM OF ALL RESPONSES TO PHQ QUESTIONS 1-9: 0
1. LITTLE INTEREST OR PLEASURE IN DOING THINGS: 0
SUM OF ALL RESPONSES TO PHQ QUESTIONS 1-9: 0

## 2023-12-07 NOTE — TELEPHONE ENCOUNTER
Sarah called in from [facility] scheduling stating that the patient is trying to schedule her ECHO  and it is dated for later and the patient is wanting to know is it a reason she can't get it done now     Please advise

## 2023-12-07 NOTE — PROGRESS NOTES
General FM note    Severiano Bui is a 61 y.o. female who presents today for follow up on her  medical conditions as noted below. Severiano Bui is c/o of   Chief Complaint   Patient presents with    Anxiety       Patient Active Problem List:     Intractable chronic migraine without aura and without status migrainosus     Essential hypertension     Past Medical History:   Diagnosis Date    Acid reflux     Anemia     Hypertension     Migraine     Mitral valve prolapse     Osteoarthritis       Past Surgical History:   Procedure Laterality Date     SECTION      COLONOSCOPY      COLONOSCOPY  2018    Screening; with 1 biopsy    COLONOSCOPY N/A 2018    COLONOSCOPY WITH BIOPSY performed by Philip Noyola MD at [address] [place] [address], COLON, DIAGNOSTIC      OTHER SURGICAL HISTORY      uterine ablation    OVARY REMOVAL      TOE SURGERY       Family History   Problem Relation Age of Onset    Neuropathy Mother     Heart Attack Father      Current Outpatient Medications   Medication Sig Dispense Refill    DULoxetine (CYMBALTA) 20 MG extended release capsule Take 1 capsule by mouth daily 30 capsule 3    verapamil (CALAN SR) 180 MG extended release tablet Take 1 tablet by mouth daily 30 tablet 0    clonazePAM (KLONOPIN) 0.5 MG tablet Take 1 tablet by mouth 2 times daily as needed for Anxiety for up to 30 days.  Max Daily Amount: 1 mg 60 tablet 0    butalbital-acetaminophen-caffeine (FIORICET, ESGIC) -40 MG per tablet Take 1 tablet by mouth every 4 hours as needed for Headaches 90 tablet 0    SUMAtriptan (IMITREX) 100 MG tablet Take 1 tablet by mouth daily as needed for Migraine 12 tablet 3    losartan (COZAAR) 100 MG tablet Take 0.5 tablets by mouth daily 90 tablet 1    RABEprazole (ACIPHEX) 20 MG tablet Take 1 tablet by mouth daily 90 tablet 1    pravastatin (PRAVACHOL) 40 MG tablet Take 1 tablet by mouth daily 90 tablet 1    spironolactone (ALDACTONE) 50 MG tablet Take 1 tablet by mouth daily

## 2023-12-26 RX ORDER — TIZANIDINE 2 MG/1
TABLET ORAL
Qty: 90 TABLET | Refills: 1 | Status: SHIPPED | OUTPATIENT
Start: 2023-12-26

## 2024-01-02 NOTE — TELEPHONE ENCOUNTER
Clarisa Lopez is calling to request a refill on the following medication(s):    Last Visit Date (If Applicable):  12/7/2023    Next Visit Date:    3/7/2024    Medication Request:  Requested Prescriptions     Pending Prescriptions Disp Refills    verapamil (CALAN SR) 180 MG extended release tablet [Pharmacy Med Name: VERAPAMIL  MG TABLET] 30 tablet 0     Sig: TAKE 1 TABLET BY MOUTH DAILY

## 2024-01-03 DIAGNOSIS — F41.9 ANXIETY: ICD-10-CM

## 2024-01-03 RX ORDER — DULOXETIN HYDROCHLORIDE 20 MG/1
20 CAPSULE, DELAYED RELEASE ORAL DAILY
Qty: 30 CAPSULE | Refills: 0 | Status: SHIPPED | OUTPATIENT
Start: 2024-01-03

## 2024-01-03 NOTE — TELEPHONE ENCOUNTER
Clarisa Lopez is calling to request a refill on the following medication(s):    Last Visit Date (If Applicable):  12/7/2023    Next Visit Date:    3/7/2024    Medication Request:  Requested Prescriptions     Pending Prescriptions Disp Refills    verapamil (CALAN SR) 180 MG extended release tablet 30 tablet 0     Sig: Take 1 tablet by mouth daily

## 2024-01-10 ENCOUNTER — TELEPHONE (OUTPATIENT)
Dept: FAMILY MEDICINE CLINIC | Age: 64
End: 2024-01-10

## 2024-01-10 DIAGNOSIS — I10 ESSENTIAL HYPERTENSION: ICD-10-CM

## 2024-01-10 DIAGNOSIS — R51.9 CHRONIC INTRACTABLE HEADACHE, UNSPECIFIED HEADACHE TYPE: Primary | ICD-10-CM

## 2024-01-10 DIAGNOSIS — G89.29 CHRONIC INTRACTABLE HEADACHE, UNSPECIFIED HEADACHE TYPE: Primary | ICD-10-CM

## 2024-01-10 NOTE — TELEPHONE ENCOUNTER
I can send the patient to a neurologist in [facility].  Any preferences?  Thank you.

## 2024-01-10 NOTE — TELEPHONE ENCOUNTER
Patient called in stating that she went to [facility] to a headache doctor  and the person she was supposed to see  was out sick so she ended up seeing someone else that was not a headache doctor and they prescribed her some rimegepant (NURTEC ODT) 75 mg disintegrating tablet and she mentioned she can't take this medication and she is wanting to know what should she do because she is having a headache consistently and she is wanting to know if you can send her to someone locally       Please advise

## 2024-01-16 DIAGNOSIS — G43.719 INTRACTABLE CHRONIC MIGRAINE WITHOUT AURA AND WITHOUT STATUS MIGRAINOSUS: ICD-10-CM

## 2024-01-16 RX ORDER — CLONAZEPAM 0.5 MG/1
0.5 TABLET ORAL 2 TIMES DAILY PRN
Qty: 60 TABLET | Refills: 0 | Status: SHIPPED | OUTPATIENT
Start: 2024-01-16 | End: 2024-02-15

## 2024-01-24 ENCOUNTER — OFFICE VISIT (OUTPATIENT)
Dept: NEUROLOGY | Age: 64
End: 2024-01-24
Payer: COMMERCIAL

## 2024-01-24 VITALS
WEIGHT: 101 LBS | BODY MASS INDEX: 16.83 KG/M2 | HEIGHT: 65 IN | DIASTOLIC BLOOD PRESSURE: 97 MMHG | HEART RATE: 78 BPM | SYSTOLIC BLOOD PRESSURE: 149 MMHG

## 2024-01-24 DIAGNOSIS — G43.711 INTRACTABLE CHRONIC MIGRAINE WITHOUT AURA AND WITH STATUS MIGRAINOSUS: Primary | ICD-10-CM

## 2024-01-24 DIAGNOSIS — I10 PRIMARY HYPERTENSION: ICD-10-CM

## 2024-01-24 DIAGNOSIS — I10 ESSENTIAL HYPERTENSION: ICD-10-CM

## 2024-01-24 PROCEDURE — 99205 OFFICE O/P NEW HI 60 MIN: CPT | Performed by: PSYCHIATRY & NEUROLOGY

## 2024-01-24 PROCEDURE — 3080F DIAST BP >= 90 MM HG: CPT | Performed by: PSYCHIATRY & NEUROLOGY

## 2024-01-24 PROCEDURE — 3077F SYST BP >= 140 MM HG: CPT | Performed by: PSYCHIATRY & NEUROLOGY

## 2024-01-24 RX ORDER — IBUPROFEN 200 MG
200 TABLET ORAL EVERY 6 HOURS PRN
COMMUNITY

## 2024-01-24 RX ORDER — PHENOL 1.4 %
AEROSOL, SPRAY (ML) MUCOUS MEMBRANE
COMMUNITY

## 2024-01-24 RX ORDER — VERAPAMIL HYDROCHLORIDE 240 MG/1
240 TABLET, FILM COATED, EXTENDED RELEASE ORAL DAILY
Qty: 30 TABLET | Refills: 5 | Status: SHIPPED | OUTPATIENT
Start: 2024-01-24

## 2024-01-24 RX ORDER — RIMEGEPANT SULFATE 75 MG/75MG
75 TABLET, ORALLY DISINTEGRATING ORAL PRN
Qty: 30 TABLET | Refills: 1 | Status: SHIPPED | OUTPATIENT
Start: 2024-01-24 | End: 2024-02-23

## 2024-01-24 NOTE — PROGRESS NOTES
[facility] NEUROLOGY SPECIALIST  [address]  [facility] [address]  Dept: [phone]    PATIENT NAME: Clarisa Lopez  PATIENT MRN: [id number]  PRIMARY CARE PHYSICIAN: Nayana Mejias MD    HPI:      Clarisa Lopez is a 63 y.o. female with a history of HTN, anemia, mitral valve prolapse, and OA, who presents to clinic today for evaluation of intractable headache.    Ms. Lopez is planning to do NEETU in February for neck pain that may be triggering the headaches.  She is currently using sumatriptan daily, most often in the evening. She also takes ibuprofen daily and is using diphenydramine to help her sleep at night.  She is taking a magnesium oxide supplement with 400 mg of this supplement daily as well. She enjoys running and is doing marathon training.  She typically has a headache when she starts running, but she pushes herself to go.  Headaches are milder or less noticeable during activity, but return when she stops.     Ms. Lopez tolerates verapamil ok, but does have to take a diuretic to manage leg swelling.  She notes that her home blood pressure has been elevated recently, in addition to today's blood pressure.    She has been seen by Dr. Chip Oreilly at [facility] as recently as 1/8/2024.  His notes are summarized as follows:      63-year-old right-handed white female with a past medical history significant for hypertension, mitral valve prolapse, increased hemoglobin,.gastroparesis and dyslipidemia who states that she has been troubled by headaches \"all my life\". More recently in the last 9 months they have been particularly problematic occurring virtually on a daily basis. Her headaches have never been associated with any neurologic symptomatology and their current troublesome nature follows a period beginning 2 - 3 years ago when she started using a natural remedy of some type which was very helpful and more precisely she did well for about a year or so. My

## 2024-01-25 ENCOUNTER — TELEPHONE (OUTPATIENT)
Dept: NEUROLOGY | Age: 64
End: 2024-01-25

## 2024-01-26 ENCOUNTER — TELEPHONE (OUTPATIENT)
Dept: FAMILY MEDICINE CLINIC | Age: 64
End: 2024-01-26

## 2024-01-26 NOTE — TELEPHONE ENCOUNTER
Patient was disconnected due to writer phone restarting unexpectedly. She is calling in regarding her high blood pressure readings lately.

## 2024-01-26 NOTE — TELEPHONE ENCOUNTER
Patient called back stating that her BP has been high. Yesterday it was 160/101 and then 163/93 and she has been altering her BP meds. She took her 100 mg Losartan med as a half pill of 50 mg this morning and her BP was 167/93 and does take her spironolactone. She is confused about what to do and how to take her meds to get her BP down. Patient needs a face to face. Please Advise when she can been seen

## 2024-01-29 ENCOUNTER — OFFICE VISIT (OUTPATIENT)
Dept: FAMILY MEDICINE CLINIC | Age: 64
End: 2024-01-29
Payer: COMMERCIAL

## 2024-01-29 VITALS
TEMPERATURE: 97.9 F | OXYGEN SATURATION: 100 % | BODY MASS INDEX: 17.77 KG/M2 | DIASTOLIC BLOOD PRESSURE: 92 MMHG | WEIGHT: 106.8 LBS | HEART RATE: 75 BPM | SYSTOLIC BLOOD PRESSURE: 185 MMHG

## 2024-01-29 DIAGNOSIS — G43.719 INTRACTABLE CHRONIC MIGRAINE WITHOUT AURA AND WITHOUT STATUS MIGRAINOSUS: Primary | ICD-10-CM

## 2024-01-29 DIAGNOSIS — I10 ESSENTIAL HYPERTENSION: ICD-10-CM

## 2024-01-29 PROCEDURE — 3080F DIAST BP >= 90 MM HG: CPT | Performed by: FAMILY MEDICINE

## 2024-01-29 PROCEDURE — 3077F SYST BP >= 140 MM HG: CPT | Performed by: FAMILY MEDICINE

## 2024-01-29 PROCEDURE — 99214 OFFICE O/P EST MOD 30 MIN: CPT | Performed by: FAMILY MEDICINE

## 2024-01-29 RX ORDER — METHOCARBAMOL 750 MG/1
750 TABLET, FILM COATED ORAL 4 TIMES DAILY
Qty: 40 TABLET | Refills: 0 | Status: SHIPPED | OUTPATIENT
Start: 2024-01-29 | End: 2024-02-08

## 2024-01-29 RX ORDER — CLONIDINE HYDROCHLORIDE 0.2 MG/1
0.2 TABLET ORAL 2 TIMES DAILY
Qty: 60 TABLET | Refills: 3 | Status: SHIPPED | OUTPATIENT
Start: 2024-01-29

## 2024-01-29 ASSESSMENT — PATIENT HEALTH QUESTIONNAIRE - PHQ9
SUM OF ALL RESPONSES TO PHQ QUESTIONS 1-9: 0
SUM OF ALL RESPONSES TO PHQ9 QUESTIONS 1 & 2: 0
SUM OF ALL RESPONSES TO PHQ QUESTIONS 1-9: 0
1. LITTLE INTEREST OR PLEASURE IN DOING THINGS: 0
2. FEELING DOWN, DEPRESSED OR HOPELESS: 0

## 2024-01-29 NOTE — PROGRESS NOTES
General FM note    Clarisa Lopez is a 63 y.o. female who presents today for follow up on her  medical conditions as noted below.  Clarisa Lopez is c/o of   Chief Complaint   Patient presents with    Hypertension       Patient Active Problem List:     Intractable chronic migraine without aura and without status migrainosus     Essential hypertension     Past Medical History:   Diagnosis Date    Acid reflux     Anemia     Hypertension     Migraine     Mitral valve prolapse     Osteoarthritis       Past Surgical History:   Procedure Laterality Date     SECTION      COLONOSCOPY      COLONOSCOPY  2018    Screening; with 1 biopsy    COLONOSCOPY N/A 2018    COLONOSCOPY WITH BIOPSY performed by Bonnie Pool MD at [facility] OR    ENDOSCOPY, COLON, DIAGNOSTIC      OTHER SURGICAL HISTORY      uterine ablation    OVARY REMOVAL      TOE SURGERY       Family History   Problem Relation Age of Onset    Neuropathy Mother     Heart Attack Father      Current Outpatient Medications   Medication Sig Dispense Refill    methocarbamol (ROBAXIN-750) 750 MG tablet Take 1 tablet by mouth 4 times daily for 10 days 40 tablet 0    cloNIDine (CATAPRES) 0.2 MG tablet Take 1 tablet by mouth 2 times daily 60 tablet 3    Probiotic Product (PROBIOTIC PO) Take by mouth      ibuprofen (ADVIL;MOTRIN) 200 MG tablet Take 1 tablet by mouth every 6 hours as needed for Pain      Melatonin 10 MG TABS Take by mouth      Fremanezumab-vfrm 225 MG/1.5ML SOAJ Inject 225 mg into the skin every 30 days 1.5 mL 5    verapamil (CALAN SR) 240 MG extended release tablet Take 1 tablet by mouth daily 30 tablet 5    Rimegepant Sulfate (NURTEC) 75 MG TBDP Take 75 mg by mouth as needed (take at onset of headache, repeat after 2 hours if needed. Max 4 tabs a day) 30 tablet 1    clonazePAM (KLONOPIN) 0.5 MG tablet Take 1 tablet by mouth 2 times daily as needed for Anxiety for up to 30 days. Max Daily Amount: 1 mg 60 tablet 0    DULoxetine

## 2024-01-30 ENCOUNTER — TELEPHONE (OUTPATIENT)
Dept: FAMILY MEDICINE CLINIC | Age: 64
End: 2024-01-30

## 2024-01-30 NOTE — TELEPHONE ENCOUNTER
Patient will wait to speak with cardiologist. Does not like the side effect of the dry month. She can break it in half but can't stand the dry month.  Please advise.

## 2024-01-30 NOTE — TELEPHONE ENCOUNTER
Patient feels like a fog has descended , feels dizzy , dry mouth from clonidine. She doesn't think she can take them again doesn't like the way it's making her feel.

## 2024-02-01 ENCOUNTER — HOSPITAL ENCOUNTER (OUTPATIENT)
Dept: CT IMAGING | Facility: CLINIC | Age: 64
Discharge: HOME | End: 2024-02-03
Payer: COMMERCIAL

## 2024-02-01 DIAGNOSIS — G43.719 INTRACTABLE CHRONIC MIGRAINE WITHOUT AURA AND WITHOUT STATUS MIGRAINOSUS: ICD-10-CM

## 2024-02-01 PROCEDURE — 70450 CT HEAD/BRAIN W/O DYE: CPT

## 2024-02-16 NOTE — TELEPHONE ENCOUNTER
Clarisa Lopez is calling to request a refill on the following medication(s):    Last Visit Date (If Applicable):  Visit date not found    Next Visit Date:    Visit date not found    Medication Request:  Requested Prescriptions     Pending Prescriptions Disp Refills    verapamil (CALAN SR) 180 MG extended release tablet [Pharmacy Med Name: VERAPAMIL 180MG SR TABLET 12H] 30 tablet 11     Sig: TAKE 1 TABLET BY MOUTH DAILY

## 2024-02-25 DIAGNOSIS — I10 ESSENTIAL HYPERTENSION: ICD-10-CM

## 2024-02-26 ENCOUNTER — OFFICE VISIT (OUTPATIENT)
Dept: NEUROLOGY | Age: 64
End: 2024-02-26
Payer: COMMERCIAL

## 2024-02-26 VITALS
HEART RATE: 59 BPM | BODY MASS INDEX: 18.26 KG/M2 | WEIGHT: 109.6 LBS | DIASTOLIC BLOOD PRESSURE: 71 MMHG | HEIGHT: 65 IN | SYSTOLIC BLOOD PRESSURE: 127 MMHG

## 2024-02-26 DIAGNOSIS — G44.40 MEDICATION OVERUSE HEADACHE: ICD-10-CM

## 2024-02-26 DIAGNOSIS — R51.9 CHRONIC DAILY HEADACHE: Primary | ICD-10-CM

## 2024-02-26 DIAGNOSIS — I10 ESSENTIAL HYPERTENSION: ICD-10-CM

## 2024-02-26 DIAGNOSIS — G43.711 INTRACTABLE CHRONIC MIGRAINE WITHOUT AURA AND WITH STATUS MIGRAINOSUS: ICD-10-CM

## 2024-02-26 PROCEDURE — 99214 OFFICE O/P EST MOD 30 MIN: CPT | Performed by: PSYCHIATRY & NEUROLOGY

## 2024-02-26 PROCEDURE — 3078F DIAST BP <80 MM HG: CPT | Performed by: PSYCHIATRY & NEUROLOGY

## 2024-02-26 PROCEDURE — 3074F SYST BP LT 130 MM HG: CPT | Performed by: PSYCHIATRY & NEUROLOGY

## 2024-02-26 RX ORDER — VALSARTAN 160 MG/1
TABLET ORAL
COMMUNITY
Start: 2024-02-08

## 2024-02-26 RX ORDER — VERAPAMIL HYDROCHLORIDE 240 MG/1
240 TABLET, FILM COATED, EXTENDED RELEASE ORAL DAILY
Qty: 90 TABLET | Refills: 0 | Status: SHIPPED | OUTPATIENT
Start: 2024-02-26 | End: 2024-05-26

## 2024-02-26 RX ORDER — METHYLPREDNISOLONE 4 MG/1
TABLET ORAL
Qty: 1 KIT | Refills: 0 | Status: SHIPPED | OUTPATIENT
Start: 2024-02-26 | End: 2024-03-03

## 2024-02-26 RX ORDER — PRAVASTATIN SODIUM 40 MG
40 TABLET ORAL DAILY
Qty: 90 TABLET | Refills: 3 | Status: SHIPPED | OUTPATIENT
Start: 2024-02-26

## 2024-02-26 RX ORDER — SPIRONOLACTONE 50 MG/1
50 TABLET, FILM COATED ORAL DAILY
Qty: 90 TABLET | Refills: 3 | Status: SHIPPED | OUTPATIENT
Start: 2024-02-26

## 2024-02-26 ASSESSMENT — ENCOUNTER SYMPTOMS
COUGH: 0
BACK PAIN: 0
SHORTNESS OF BREATH: 0
DIARRHEA: 0
WHEEZING: 0
CHEST TIGHTNESS: 0
RHINORRHEA: 0
PHOTOPHOBIA: 1
EYE REDNESS: 0
TROUBLE SWALLOWING: 0
NAUSEA: 0
VOICE CHANGE: 0
ABDOMINAL PAIN: 0
EYE ITCHING: 0
SORE THROAT: 0
EYE PAIN: 0
EYE DISCHARGE: 0
CHOKING: 0
ABDOMINAL DISTENTION: 0
CONSTIPATION: 0

## 2024-02-26 NOTE — PATIENT INSTRUCTIONS
Stop taking Imitrex immediately   Stop taking ibuprofen immediately   Stop taking Fioricet immediately   Utilize Medrol dose Daniel to break your headache cycle  Continue with Nurtec as abortive   Continue with Ajovy as preventive

## 2024-02-26 NOTE — PROGRESS NOTES
she started using a natural remedy of some type which was very helpful and more precisely she did well for about a year or so. My understanding is at that time she stopped all of the other medications but then more recently as note previously they have returned with a vengeance. Her headaches typically begin in the right more than left frontal region and are hemispheric when fully developed this more commonly on the right. The headache qualitatively is deep but nonpulsatile although a sharp, lancinating element is noted. Commonly but not always the headache is associated with discomfort in the right cervical and trapezius region. With severe she develops nausea but has only vomited on a solitary occasion. Photophobia, sonophobia and osmophobia are typically associated. The headache typically awakens her from sleep but there is no definite history of headache onset with coughing, sneezing, Valsalva maneuvers or rapid movements. Currently her headaches are the most troublesome they have ever been. She estimates that when her headaches were well-controlled with the aforementioned natural preparation she was having perhaps one headache monthly. Her headaches began at the age of approximately 12 but it was not until her 30s when she was diagnosed as having migraine. She estimates that in general she would have a few headaches weekly or monthly. There is no definite history that her headaches gradually became more problematic. Current treatment consists of 180 mg verapamil sustained-release preparation daily. Symptomatic treatment consists of the use of over-the-counter ibuprofen. Interestingly she states that she takes 3-4 to these in the morning and then in the evening. She also uses Imitrex and may take one half of an Imitrex daily. She has been doing this for the last several months. Prior treatment has included propranolol which was not well-tolerated. Topiramate (not tolerated--tongue tingling), Neurontin and Elavil 
drift      Sensory function Intact to light touch, pinprick, vibration, proprioception on all 4 extremities      Cerebellar Intact fine motor movement. No involuntary movements or tremors. No ataxia or dysmetria on finger to nose or heel to shin testing      Reflex function DTR 2+ on bilateral UE and LE, symmetric.       Gait                   normal base and arm swing      DATA:     CT head 2/3/2024: no mass, hemorrhage, or restricted diffusion.     ASSESSMENT / PLAN:     Clarisa Lopez is a 63 y.o. female presenting today for a follow-up visit for the management of chronic daily migrainous headaches.  She establish care with me on January 24, 2024.  At that time, patient was advised to reduce intake of her daily headache medications as she likely has an overlay of medication overuse.  Furthermore, patient's verapamil was increased to 240 once daily and patient was started on Ajovy as a prophylactic and Nurtec as an abortive.  Reports modest benefit from Ajovy but has continued to utilize Imitrex on a daily basis as she experiences more adequate relief with this medication.  Continues to experience 21 headache days in a given month.     On this clinic visit, patient was strongly advised to stop taking ibuprofen twice daily, Imitrex once nightly, and occasional Fioricet as all of these medications are likely contributing to her rebound headaches.  Patient did experience symptomatic relief in the past with Medrol Dosepak and she was advised to utilize it in hopes that her headache cycle resolves.  Her neurological examination is without any focal deficits.  Patient does not report a change in headache quality since she was last seen.        PLAN:       Chronic daily headache, chronic migraine without aura:      -Continue with Ajovy 225 mg subcutaneous injections every 30 days.  Will provide another sample dose to the patient and we will see if it gets approved by insurance.  -Continue with Nurtec 75 milligrams

## 2024-03-04 RX ORDER — SUMATRIPTAN 100 MG/1
100 TABLET, FILM COATED ORAL DAILY PRN
Qty: 12 TABLET | Refills: 3 | Status: SHIPPED | OUTPATIENT
Start: 2024-03-04

## 2024-03-07 ENCOUNTER — OFFICE VISIT (OUTPATIENT)
Dept: FAMILY MEDICINE CLINIC | Age: 64
End: 2024-03-07
Payer: COMMERCIAL

## 2024-03-07 VITALS
BODY MASS INDEX: 18.07 KG/M2 | SYSTOLIC BLOOD PRESSURE: 137 MMHG | DIASTOLIC BLOOD PRESSURE: 85 MMHG | WEIGHT: 108.6 LBS | TEMPERATURE: 97.5 F | HEART RATE: 75 BPM | OXYGEN SATURATION: 100 %

## 2024-03-07 DIAGNOSIS — I10 ESSENTIAL HYPERTENSION: Primary | ICD-10-CM

## 2024-03-07 DIAGNOSIS — G43.719 INTRACTABLE CHRONIC MIGRAINE WITHOUT AURA AND WITHOUT STATUS MIGRAINOSUS: ICD-10-CM

## 2024-03-07 DIAGNOSIS — F41.9 ANXIETY: ICD-10-CM

## 2024-03-07 PROCEDURE — 3079F DIAST BP 80-89 MM HG: CPT | Performed by: FAMILY MEDICINE

## 2024-03-07 PROCEDURE — 99213 OFFICE O/P EST LOW 20 MIN: CPT | Performed by: FAMILY MEDICINE

## 2024-03-07 PROCEDURE — 3075F SYST BP GE 130 - 139MM HG: CPT | Performed by: FAMILY MEDICINE

## 2024-03-07 RX ORDER — VALSARTAN 160 MG/1
160 TABLET ORAL DAILY
Qty: 90 TABLET | Refills: 1 | Status: SHIPPED | OUTPATIENT
Start: 2024-03-07

## 2024-03-07 RX ORDER — DULOXETIN HYDROCHLORIDE 20 MG/1
20 CAPSULE, DELAYED RELEASE ORAL DAILY
Qty: 90 CAPSULE | Refills: 1 | Status: SHIPPED | OUTPATIENT
Start: 2024-03-07

## 2024-03-07 RX ORDER — CLONAZEPAM 0.5 MG/1
TABLET ORAL
Qty: 60 TABLET | OUTPATIENT
Start: 2024-03-07

## 2024-03-07 RX ORDER — CLONAZEPAM 0.5 MG/1
0.5 TABLET ORAL 2 TIMES DAILY PRN
Qty: 60 TABLET | Refills: 0 | Status: SHIPPED | OUTPATIENT
Start: 2024-03-07 | End: 2024-04-06

## 2024-03-07 NOTE — PATIENT INSTRUCTIONS
Thank you for choosing [facility].  We know you have options when it comes to your healthcare; we appreciate that you chose us. Our goal is to provide exceptional  service and world class care to every patient.  You will be receiving a survey via email or text message asking for your feedback.  Please take a few minutes to share your thoughts about your recent visit. Your comments helps us understand what we do well and ways we can improve.  Thank you in advance for your valuable feedback.

## 2024-03-07 NOTE — PROGRESS NOTES
General FM note    Clarisa Lopez is a 63 y.o. female who presents today for follow up on her  medical conditions as noted below.  Clarisa Lopez is c/o of   Chief Complaint   Patient presents with    Anxiety       Patient Active Problem List:     Intractable chronic migraine without aura and without status migrainosus     Essential hypertension     Past Medical History:   Diagnosis Date    Acid reflux     Anemia     Hypertension     Migraine     Mitral valve prolapse     Osteoarthritis       Past Surgical History:   Procedure Laterality Date     SECTION      COLONOSCOPY      COLONOSCOPY  2018    Screening; with 1 biopsy    COLONOSCOPY N/A 2018    COLONOSCOPY WITH BIOPSY performed by Bonnie Pool MD at [facility] OR    ENDOSCOPY, COLON, DIAGNOSTIC      OTHER SURGICAL HISTORY      uterine ablation    OVARY REMOVAL      TOE SURGERY       Family History   Problem Relation Age of Onset    Neuropathy Mother     Heart Attack Father      Current Outpatient Medications   Medication Sig Dispense Refill    valsartan (DIOVAN) 160 MG tablet Take 1 tablet by mouth daily 90 tablet 1    DULoxetine (CYMBALTA) 20 MG extended release capsule Take 1 capsule by mouth daily 90 capsule 1    SUMAtriptan (IMITREX) 100 MG tablet TAKE 1 TABLET BY MOUTH DAILY AS NEEDED FOR MIGRAINE 12 tablet 3    pravastatin (PRAVACHOL) 40 MG tablet TAKE 1 TABLET BY MOUTH DAILY 90 tablet 3    spironolactone (ALDACTONE) 50 MG tablet TAKE 1 TABLET BY MOUTH DAILY (Patient taking differently: Take 0.5 tablets by mouth daily) 90 tablet 3    verapamil (CALAN SR) 240 MG extended release tablet Take 1 tablet by mouth daily 90 tablet 0    Rimegepant Sulfate 75 MG TBDP Take 75 mg by mouth daily as needed (for severe headaches) 16 tablet 5    Probiotic Product (PROBIOTIC PO) Take by mouth      ibuprofen (ADVIL;MOTRIN) 200 MG tablet Take 1 tablet by mouth every 6 hours as needed for Pain      Melatonin 10 MG TABS Take by mouth

## 2024-03-13 ENCOUNTER — TELEPHONE (OUTPATIENT)
Dept: NEUROLOGY | Age: 64
End: 2024-03-13

## 2024-03-13 NOTE — TELEPHONE ENCOUNTER
Clarisa called the office this morning and left a  asking for a return call.  Call returned to patient.  Clarisa stated that she has been training for a marathon and has been running a lot more lately.  Early Tuesday she developed a \"pressure\" in her head that gets worse with activity / bending over.  The back of her head is also tender to touch.  Clarisa stated that the Aimovig really helped the \"pain on the right side of my head but didn't help with the pressure\".  She tried Nurtec which did not help.  Clarisa stated that she just started the prednisone yesterday.  Writer advised that it may take several days before she notices any benefit from the steroid and to continue as prescribed.  Writer also advised that she stay well hydrated.  Patient informed that I would forward this information to Dr. Tang.

## 2024-03-13 NOTE — TELEPHONE ENCOUNTER
Call placed back to the patient and writer read Dr. Tang's note to her.  Clarisa stated that she doesn't have any of those symptoms.  She did admit to a little occasional dizziness when she is laying for just a few seconds but then it goes away.  Clarisa stated that she hasn't been using Imitrex or Fioricet since her last visit.  She did use some ibuprofen on Saturday when she ran 16 miles.  Patient states that she has been using Nurtec instead.

## 2024-03-14 NOTE — TELEPHONE ENCOUNTER
Call placed to the patient and this information was given.  Patient verbally stated their understanding.   Clarisa did say that the pressure was better today.

## 2024-03-21 RX ORDER — RABEPRAZOLE SODIUM 20 MG/1
20 TABLET, DELAYED RELEASE ORAL DAILY
Qty: 90 TABLET | Refills: 3 | Status: SHIPPED | OUTPATIENT
Start: 2024-03-21

## 2024-03-21 NOTE — TELEPHONE ENCOUNTER
Clarisa Lopez is calling to request a refill on the following medication(s):    Last Visit Date (If Applicable):  3/7/2024    Next Visit Date:    Visit date not found    Medication Request:  Requested Prescriptions     Pending Prescriptions Disp Refills    RABEprazole (ACIPHEX) 20 MG tablet [Pharmacy Med Name: RABEPRAZOLE  20MG  TAB] 90 tablet 3     Sig: TAKE 1 TABLET BY MOUTH DAILY

## 2024-04-10 DIAGNOSIS — G43.719 INTRACTABLE CHRONIC MIGRAINE WITHOUT AURA AND WITHOUT STATUS MIGRAINOSUS: ICD-10-CM

## 2024-04-10 RX ORDER — CLONAZEPAM 0.5 MG/1
0.5 TABLET ORAL 2 TIMES DAILY PRN
Qty: 60 TABLET | Refills: 0 | Status: SHIPPED | OUTPATIENT
Start: 2024-04-10 | End: 2024-05-10

## 2024-04-10 NOTE — TELEPHONE ENCOUNTER
Clarisa Lopez is calling to request a refill on the following medication(s):    Last Visit Date (If Applicable):  3/7/2024    Next Visit Date:    5/30/2024    Medication Request:  Requested Prescriptions     Pending Prescriptions Disp Refills    clonazePAM (KLONOPIN) 0.5 MG tablet 60 tablet 0     Sig: Take 1 tablet by mouth 2 times daily as needed for Anxiety for up to 30 days. Max Daily Amount: 1 mg

## 2024-04-15 DIAGNOSIS — G43.711 INTRACTABLE CHRONIC MIGRAINE WITHOUT AURA AND WITH STATUS MIGRAINOSUS: ICD-10-CM

## 2024-04-15 RX ORDER — RIMEGEPANT SULFATE 75 MG/75MG
TABLET, ORALLY DISINTEGRATING ORAL
Qty: 16 TABLET | OUTPATIENT
Start: 2024-04-15

## 2024-04-29 DIAGNOSIS — G43.711 INTRACTABLE CHRONIC MIGRAINE WITHOUT AURA AND WITH STATUS MIGRAINOSUS: ICD-10-CM

## 2024-04-29 DIAGNOSIS — I10 ESSENTIAL HYPERTENSION: ICD-10-CM

## 2024-04-30 RX ORDER — VERAPAMIL HYDROCHLORIDE 240 MG/1
240 TABLET, FILM COATED, EXTENDED RELEASE ORAL DAILY
Qty: 90 TABLET | Refills: 3 | OUTPATIENT
Start: 2024-04-30 | End: 2024-07-29

## 2024-04-30 NOTE — PROGRESS NOTES
[facility] NEUROLOGY SPECIALIST  [address]  [facility] [address]  Dept: [phone]    PATIENT NAME: Clarisa Lopez  PATIENT MRN: [id number]  PRIMARY CARE PHYSICIAN: Nayana Mejias MD    HPI:      Clarisa Lopez is a 62 y/o female who I initially saw for headaches on 1/24/2024.   Her history is summarized as follows:      Clarisa Lopez is a 63 y.o. female with a history of HTN, anemia, mitral valve prolapse, and OA, who presents to clinic today for evaluation of intractable headache.     Ms. Lopez is planning to do NEETU in February for neck pain that may be triggering the headaches.  She is currently using sumatriptan daily, most often in the evening. She also takes ibuprofen daily and is using diphenydramine to help her sleep at night.  She is taking a magnesium oxide supplement with 400 mg of this supplement daily as well. She enjoys running and is doing marathon training.  She typically has a headache when she starts running, but she pushes herself to go.  Headaches are milder or less noticeable during activity, but return when she stops.      Ms. Lopez tolerates verapamil ok, but does have to take a diuretic to manage leg swelling.  She notes that her home blood pressure has been elevated recently, in addition to today's blood pressure.     She has been seen by Dr. Chip Oreilly at [facility] as recently as 1/8/2024.  His notes are summarized as follows:      63-year-old right-handed white female with a past medical history significant for hypertension, mitral valve prolapse, increased hemoglobin,.gastroparesis and dyslipidemia who states that she has been troubled by headaches \"all my life\". More recently in the last 9 months they have been particularly problematic occurring virtually on a daily basis. Her headaches have never been associated with any neurologic symptomatology and their current troublesome nature follows a period beginning 2 - 3 years ago when

## 2024-05-01 ENCOUNTER — TELEPHONE (OUTPATIENT)
Dept: NEUROLOGY | Age: 64
End: 2024-05-01

## 2024-05-01 ENCOUNTER — HOSPITAL ENCOUNTER (OUTPATIENT)
Dept: INFUSION THERAPY | Facility: MEDICAL CENTER | Age: 64
Discharge: HOME | End: 2024-05-01
Payer: COMMERCIAL

## 2024-05-01 ENCOUNTER — TELEPHONE (OUTPATIENT)
Dept: INFUSION THERAPY | Facility: MEDICAL CENTER | Age: 64
End: 2024-05-01

## 2024-05-01 ENCOUNTER — OFFICE VISIT (OUTPATIENT)
Dept: NEUROLOGY | Age: 64
End: 2024-05-01
Payer: COMMERCIAL

## 2024-05-01 VITALS
SYSTOLIC BLOOD PRESSURE: 157 MMHG | DIASTOLIC BLOOD PRESSURE: 81 MMHG | WEIGHT: 108 LBS | BODY MASS INDEX: 17.99 KG/M2 | HEART RATE: 65 BPM | HEIGHT: 65 IN

## 2024-05-01 DIAGNOSIS — M79.10 MYALGIA: ICD-10-CM

## 2024-05-01 DIAGNOSIS — G43.711 INTRACTABLE CHRONIC MIGRAINE WITHOUT AURA AND WITH STATUS MIGRAINOSUS: Primary | ICD-10-CM

## 2024-05-01 DIAGNOSIS — G43.109 MIGRAINE AURA WITHOUT HEADACHE: ICD-10-CM

## 2024-05-01 DIAGNOSIS — E86.0 DEHYDRATION: Primary | ICD-10-CM

## 2024-05-01 PROCEDURE — 96360 HYDRATION IV INFUSION INIT: CPT

## 2024-05-01 PROCEDURE — 3079F DIAST BP 80-89 MM HG: CPT | Performed by: PSYCHIATRY & NEUROLOGY

## 2024-05-01 PROCEDURE — 2580000003 HC RX 258: Performed by: PSYCHIATRY & NEUROLOGY

## 2024-05-01 PROCEDURE — 99214 OFFICE O/P EST MOD 30 MIN: CPT | Performed by: PSYCHIATRY & NEUROLOGY

## 2024-05-01 PROCEDURE — 3077F SYST BP >= 140 MM HG: CPT | Performed by: PSYCHIATRY & NEUROLOGY

## 2024-05-01 RX ORDER — SODIUM CHLORIDE 9 MG/ML
5-250 INJECTION, SOLUTION INTRAVENOUS PRN
OUTPATIENT
Start: 2024-05-01

## 2024-05-01 RX ORDER — ONDANSETRON 2 MG/ML
8 INJECTION INTRAMUSCULAR; INTRAVENOUS
OUTPATIENT
Start: 2024-05-01

## 2024-05-01 RX ORDER — DIPHENHYDRAMINE HYDROCHLORIDE 50 MG/ML
50 INJECTION INTRAMUSCULAR; INTRAVENOUS
OUTPATIENT
Start: 2024-05-01

## 2024-05-01 RX ORDER — ACETAMINOPHEN 325 MG/1
650 TABLET ORAL
OUTPATIENT
Start: 2024-05-01

## 2024-05-01 RX ORDER — SODIUM CHLORIDE 9 MG/ML
INJECTION, SOLUTION INTRAVENOUS CONTINUOUS
OUTPATIENT
Start: 2024-05-01

## 2024-05-01 RX ORDER — SODIUM CHLORIDE 0.9 % (FLUSH) 0.9 %
5-40 SYRINGE (ML) INJECTION PRN
OUTPATIENT
Start: 2024-05-01

## 2024-05-01 RX ORDER — FAMOTIDINE 10 MG/ML
20 INJECTION, SOLUTION INTRAVENOUS
OUTPATIENT
Start: 2024-05-01

## 2024-05-01 RX ORDER — SODIUM CHLORIDE 9 MG/ML
5-250 INJECTION, SOLUTION INTRAVENOUS ONCE
OUTPATIENT
Start: 2024-05-01 | End: 2024-05-01

## 2024-05-01 RX ORDER — 0.9 % SODIUM CHLORIDE 0.9 %
1000 INTRAVENOUS SOLUTION INTRAVENOUS ONCE
Status: DISCONTINUED | OUTPATIENT
Start: 2024-05-01 | End: 2024-05-01 | Stop reason: CLARIF

## 2024-05-01 RX ORDER — HEPARIN SODIUM (PORCINE) LOCK FLUSH IV SOLN 100 UNIT/ML 100 UNIT/ML
500 SOLUTION INTRAVENOUS PRN
OUTPATIENT
Start: 2024-05-01

## 2024-05-01 RX ORDER — EPINEPHRINE 1 MG/ML
0.3 INJECTION, SOLUTION, CONCENTRATE INTRAVENOUS PRN
OUTPATIENT
Start: 2024-05-01

## 2024-05-01 RX ORDER — 0.9 % SODIUM CHLORIDE 0.9 %
1000 INTRAVENOUS SOLUTION INTRAVENOUS ONCE
Status: COMPLETED | OUTPATIENT
Start: 2024-05-01 | End: 2024-05-01

## 2024-05-01 RX ORDER — SPIRONOLACTONE 25 MG/1
25 TABLET ORAL DAILY
COMMUNITY

## 2024-05-01 RX ORDER — ALBUTEROL SULFATE 90 UG/1
4 AEROSOL, METERED RESPIRATORY (INHALATION) PRN
OUTPATIENT
Start: 2024-05-01

## 2024-05-01 RX ADMIN — SODIUM CHLORIDE 1000 ML: 9 INJECTION, SOLUTION INTRAVENOUS at 13:15

## 2024-05-01 NOTE — PROGRESS NOTES
Patient arrived ambulatory for hydration.  Orders reviewed.  Pt denies any concerns or complaints   IV started per protocol   Hydration  initiated and completed.  IV removed , pressure dressing applied   Patient discharged off unit .

## 2024-05-01 NOTE — TELEPHONE ENCOUNTER
Dr. Tang saw Clarisa today and placed a therapy plan for IV fluids. The pt. ran in the [facility] [place].  Pt wants to go to [facility] or [facility]. I called [facility] and spoke with Roxanne SWANSON. She said they will fit her in today.

## 2024-05-01 NOTE — TELEPHONE ENCOUNTER
Writer spoke with Darlene mcintyre Dr. Bites office to verify Hydration order .   Verbal order obtained for 1000 ml of NS over 1 hour .

## 2024-05-02 ENCOUNTER — CLINICAL DOCUMENTATION (OUTPATIENT)
Facility: HOSPITAL | Age: 64
End: 2024-05-02

## 2024-05-03 ENCOUNTER — PATIENT MESSAGE (OUTPATIENT)
Dept: FAMILY MEDICINE CLINIC | Age: 64
End: 2024-05-03

## 2024-05-03 DIAGNOSIS — M25.561 ACUTE PAIN OF BOTH KNEES: Primary | ICD-10-CM

## 2024-05-03 DIAGNOSIS — M25.562 ACUTE PAIN OF BOTH KNEES: Primary | ICD-10-CM

## 2024-05-06 NOTE — TELEPHONE ENCOUNTER
From: Clarisa Lopez  To: Dr. Nayana Mejias  Sent: 5/3/2024 1:34 PM EDT  Subject: Therapy     My knees are bad after running the marathon. I went to see Flakito Smith at [facility] in [place] a couple years ago and he really helped. Would it be possible to get a referral to see him again?

## 2024-05-14 ENCOUNTER — HOSPITAL ENCOUNTER (OUTPATIENT)
Dept: PHYSICAL THERAPY | Facility: CLINIC | Age: 64
Setting detail: THERAPIES SERIES
Discharge: HOME | End: 2024-05-14
Payer: COMMERCIAL

## 2024-05-14 PROCEDURE — 97140 MANUAL THERAPY 1/> REGIONS: CPT

## 2024-05-14 PROCEDURE — 97161 PT EVAL LOW COMPLEX 20 MIN: CPT

## 2024-05-14 NOTE — CONSULTS
[] [facility]  Outpatient Rehabilitation &  Therapy  [address]  P: [phone]  F: [phone] [x] [facility]  Outpatient Rehabilitation &  Therapy  [address]  P: [phone]  F: [phone]        Physical Therapy Running Evaluation    Date:  2024  Patient: Clarisa Lopez   : 1960  MRN: [id number]  Physician: Dr. Nayana Mejias  Insurance: [employer], $40 copay, 60 visit hard max   Medical Diagnosis: irene knee pain  Rehab Codes: M25.569  Onset date: 24    Next 's appt.: 5/15/24 (Abelardo), 24 (Magalie)    Subjective:   CC: irene knee pain   HPI: Increased knee pain after running the [employer]. Pain started 13 miles into the marathon. Had to walk the last 4 miles. She stated she could barely walk after the race and felt nauseous the next day from the pain. She took Tylenol for the next couple of days after that. She reached out to PCP who ordered PT.  She is also seeing Dr. Arroyo. Potential for joint replacement due to knee OA has not been discussed. No pain with sitting or squatting, but \"walking is incredibly painful.\" She states she is unable to complete past HEP due to the pain. She watches her grandkids and is worried about her ability to do activities with them due to the pain.     PMHx: [] Unremarkable [] Diabetes [x] HTN  [] Pacemaker   [] MI/Heart Problems [] Cancer [] Arthritis  [] Other:              [x] Refer to full medical chart  In EPIC     Tests: [] X-Ray: [] MRI: [x] none:     Medications: [x] Refer to full medical record [] None [] Other:  Allergies:      [x] Refer to full medical record [] None [] Other:    Working:  [] Normal Duty  [] Light Duty  [] Off D/T Condition  [] Retired    [x] Not Employed    []  Disability  [] Other:           Return to work:     Job/ADL Description:  School:  Next goal race: [place]ibly [place] or [place]     Pain:  [x] Yes  [] No Location: lateral and posterior aspect  Pain

## 2024-05-16 ENCOUNTER — HOSPITAL ENCOUNTER (OUTPATIENT)
Dept: PHYSICAL THERAPY | Facility: CLINIC | Age: 64
Setting detail: THERAPIES SERIES
Discharge: HOME | End: 2024-05-16
Payer: COMMERCIAL

## 2024-05-16 PROCEDURE — 97140 MANUAL THERAPY 1/> REGIONS: CPT

## 2024-05-16 PROCEDURE — 97110 THERAPEUTIC EXERCISES: CPT

## 2024-05-16 NOTE — FLOWSHEET NOTE
[] [facility]  Outpatient Rehabilitation &  Therapy  [address]  [phone]  [phone] [] [facility]  Outpatient Rehabilitation &  Therapy  [address]  P: [phone]  F: [phone] [] [facility]  Outpatient Rehabilitation &  Therapy  [address]  P: [phone]  F: [phone] [x] [facility]  Outpatient Rehabilitation &  Therapy  [address]  [phone]  [phone] [] [facility]  Outpatient Rehabilitation &  Therapy  [address]   P: [phone]  F: [phone]  [] [facility]  Outpatient Rehabilitation &  Therapy  [address]  P: [phone]  F: [phone] [] [facility]  Outpatient Rehabilitation &  Therapy  [address]  P: [phone]  F: [phone] [] [facility]  Outpatient Rehabilitation &  Therapy  [address]  P: [phone]  F: [phone] [] [facility]  Outpatient Rehabilitation &  Therapy  [address]  P: [phone]  F: [phone]  [] [facility] Outpatient Rehabilitation &  Therapy  [address]  P: [phone]  F: [phone]     Physical Therapy Daily Treatment Note    Date:  2024  Patient Name:  Clarisa Lopez    :  1960  MRN: [id number]  Physician: Dr. Nayana Mejias                      Insurance: BCBS, $40 copay, 60 visit hard max   Medical Diagnosis: irene knee pain                  Rehab Codes: M25.569  Onset date: 24                                       Next 's appt.: 5/15/24 (Abelardo), 24 (Magalie)  Visit# / total visits: 2/10;      Cancels/No Shows: 0/0    Subjective:  Patient states she went for a 20 mile bike ride yesterday and took a several mile walk this morning which increased her knee pain. Patient notes she felt good

## 2024-05-22 DIAGNOSIS — G43.719 INTRACTABLE CHRONIC MIGRAINE WITHOUT AURA AND WITHOUT STATUS MIGRAINOSUS: ICD-10-CM

## 2024-05-22 RX ORDER — CLONAZEPAM 0.5 MG/1
TABLET ORAL
Qty: 60 TABLET | Refills: 0 | Status: SHIPPED | OUTPATIENT
Start: 2024-05-22 | End: 2024-06-21

## 2024-05-23 ENCOUNTER — HOSPITAL ENCOUNTER (OUTPATIENT)
Dept: PHYSICAL THERAPY | Facility: CLINIC | Age: 64
Setting detail: THERAPIES SERIES
Discharge: HOME | End: 2024-05-23
Payer: COMMERCIAL

## 2024-05-23 ENCOUNTER — TELEPHONE (OUTPATIENT)
Dept: NEUROLOGY | Age: 64
End: 2024-05-23

## 2024-05-23 DIAGNOSIS — G43.711 INTRACTABLE CHRONIC MIGRAINE WITHOUT AURA AND WITH STATUS MIGRAINOSUS: Primary | ICD-10-CM

## 2024-05-23 PROCEDURE — 97140 MANUAL THERAPY 1/> REGIONS: CPT

## 2024-05-23 PROCEDURE — 97110 THERAPEUTIC EXERCISES: CPT

## 2024-05-23 RX ORDER — NARATRIPTAN 2.5 MG/1
2.5 TABLET ORAL SEE ADMIN INSTRUCTIONS
Qty: 6 TABLET | Refills: 0 | Status: SHIPPED | OUTPATIENT
Start: 2024-05-23

## 2024-05-23 NOTE — TELEPHONE ENCOUNTER
Pt called in stating that she has been doing really well with her migraines, but yesterday she got a terrible migraine. She said that she took 2 Nurtec. I told her that it is limited to 1 per 24 hours, per manufacture guidelines. She said that the pharmacy put limit 4 per day on her script. She also took a Imitrex and it did not go away. She woke up with it still today, just not as bad. She is asking if we can send in another Medrol dose pack, the last one she had was about 3 months ago.    She would like it to go to [facility] if we send one in.    Please advise, thanks.

## 2024-05-23 NOTE — FLOWSHEET NOTE
[] [facility]  Outpatient Rehabilitation &  Therapy  [address]  [phone]  [phone] [] [facility]  Outpatient Rehabilitation &  Therapy  [address]  P: [phone]  F: [phone] [] [facility]  Outpatient Rehabilitation &  Therapy  [address]  P: [phone]  F: [phone] [x] [facility]  Outpatient Rehabilitation &  Therapy  [address]  [phone]  [phone] [] [facility]  Outpatient Rehabilitation &  Therapy  [address]   P: [phone]  F: [phone]  [] [facility]  Outpatient Rehabilitation &  Therapy  [address]  P: [phone]  F: [phone] [] [facility]  Outpatient Rehabilitation &  Therapy  [address]  P: [phone]  F: [phone] [] [facility]  Outpatient Rehabilitation &  Therapy  [address]  P: [phone]  F: [phone] [] [facility]  Outpatient Rehabilitation &  Therapy  [address]  P: [phone]  F: [phone]  [] [facility] Outpatient Rehabilitation &  Therapy  [address]  P: [phone]  F: [phone]     Physical Therapy Daily Treatment Note    Date:  2024  Patient Name:  Clarisa Lopez    :  1960  MRN: [id number]  Physician: Dr. Nayana Mejias                      Insurance: BCBS, $40 copay, 60 visit hard max   Medical Diagnosis: irene knee pain                  Rehab Codes: M25.569  Onset date: 24                                       Next 's appt.: 5/15/24 (Abelardo), 24 (Magalie)  Visit# / total visits: 3/10;      Cancels/No Shows: 0/0    Subjective:  Patient states she is still having high pain levels when walking. Patient does note a slight improvement in the intensity of pain.   Pain:  [x] Yes  []

## 2024-05-28 ENCOUNTER — HOSPITAL ENCOUNTER (OUTPATIENT)
Dept: PHYSICAL THERAPY | Facility: CLINIC | Age: 64
Setting detail: THERAPIES SERIES
Discharge: HOME | End: 2024-05-28
Payer: COMMERCIAL

## 2024-05-28 PROCEDURE — 97110 THERAPEUTIC EXERCISES: CPT

## 2024-05-28 NOTE — FLOWSHEET NOTE
[] [facility]  Outpatient Rehabilitation &  Therapy  [address]  [phone]  [phone] [] [facility]  Outpatient Rehabilitation &  Therapy  [address]  P: [phone]  F: [phone] [] [facility]  Outpatient Rehabilitation &  Therapy  [address]  P: [phone]  F: [phone] [x] [facility]  Outpatient Rehabilitation &  Therapy  [address]  [phone]  [phone] [] [facility]  Outpatient Rehabilitation &  Therapy  [address]   P: [phone]  F: [phone]  [] [facility]  Outpatient Rehabilitation &  Therapy  [address]  P: [phone]  F: [phone] [] [facility]  Outpatient Rehabilitation &  Therapy  [address]  P: [phone]  F: [phone] [] [facility]  Outpatient Rehabilitation &  Therapy  [address]  P: [phone]  F: [phone] [] [facility]  Outpatient Rehabilitation &  Therapy  [address]  P: [phone]  F: [phone]  [] [facility] Outpatient Rehabilitation &  Therapy  [address]  P: [phone]  F: [phone]     Physical Therapy Daily Treatment Note    Date:  2024  Patient Name:  Clarisa Lopez   :  1960  MRN: [id number]  Physician: Dr. Nayana Mejias                     Insurance: BCBS, $40 copay, 60 visit hard max   Medical Diagnosis: irene knee pain                 Rehab Codes: M25.569  Onset date: 24                                      Next 's appt.: 5/15/24 (Abelardo), 24 (Magalie)  Visit# / total visits: 4/10   Cancels/No Shows: 0/0    Subjective:  Patient states she is still having high pain levels when walking. Patient does note a slight improvement in the intensity of pain.   Pain:  [x] Yes  [] No Location:

## 2024-05-30 ENCOUNTER — OFFICE VISIT (OUTPATIENT)
Dept: FAMILY MEDICINE CLINIC | Age: 64
End: 2024-05-30
Payer: COMMERCIAL

## 2024-05-30 ENCOUNTER — HOSPITAL ENCOUNTER (OUTPATIENT)
Dept: PHYSICAL THERAPY | Facility: CLINIC | Age: 64
Setting detail: THERAPIES SERIES
Discharge: HOME | End: 2024-05-30
Payer: COMMERCIAL

## 2024-05-30 VITALS
DIASTOLIC BLOOD PRESSURE: 88 MMHG | BODY MASS INDEX: 18.17 KG/M2 | TEMPERATURE: 97.6 F | HEART RATE: 82 BPM | SYSTOLIC BLOOD PRESSURE: 138 MMHG | OXYGEN SATURATION: 100 % | WEIGHT: 109.2 LBS

## 2024-05-30 DIAGNOSIS — F41.9 ANXIETY: ICD-10-CM

## 2024-05-30 DIAGNOSIS — I10 ESSENTIAL HYPERTENSION: Primary | ICD-10-CM

## 2024-05-30 PROCEDURE — 97110 THERAPEUTIC EXERCISES: CPT

## 2024-05-30 PROCEDURE — 99213 OFFICE O/P EST LOW 20 MIN: CPT | Performed by: FAMILY MEDICINE

## 2024-05-30 PROCEDURE — 3079F DIAST BP 80-89 MM HG: CPT | Performed by: FAMILY MEDICINE

## 2024-05-30 PROCEDURE — 3075F SYST BP GE 130 - 139MM HG: CPT | Performed by: FAMILY MEDICINE

## 2024-05-30 RX ORDER — SPIRONOLACTONE 25 MG/1
25 TABLET ORAL DAILY
Qty: 90 TABLET | Refills: 1 | Status: SHIPPED | OUTPATIENT
Start: 2024-05-30

## 2024-05-30 NOTE — PATIENT INSTRUCTIONS
Thank you for choosing [facility].  We know you have options when it comes to your healthcare; we appreciate that you chose us. Our goal is to provide exceptional  service and world class care to every patient.  You will be receiving a survey via email or text message asking for your feedback.  Please take a few minutes to share your thoughts about your recent visit. Your comments helps us understand what we do well and ways we can improve.  Thank you in advance for your valuable feedback.

## 2024-05-30 NOTE — FLOWSHEET NOTE
[] [facility]  Outpatient Rehabilitation &  Therapy  [address]  [phone]  [phone] [] [facility]  Outpatient Rehabilitation &  Therapy  [address]  P: [phone]  F: [phone] [] [facility]  Outpatient Rehabilitation &  Therapy  [address]  P: [phone]  F: [phone] [x] [facility]  Outpatient Rehabilitation &  Therapy  [address]  [phone]  [phone] [] [facility]  Outpatient Rehabilitation &  Therapy  [address]   P: [phone]  F: [phone]  [] [facility]  Outpatient Rehabilitation &  Therapy  [address]  P: [phone]  F: [phone] [] [facility]  Outpatient Rehabilitation &  Therapy  [address]  P: [phone]  F: [phone] [] [facility]  Outpatient Rehabilitation &  Therapy  [address]  P: [phone]  F: [phone] [] [facility]  Outpatient Rehabilitation &  Therapy  [address]  P: [phone]  F: [phone]  [] [facility] Outpatient Rehabilitation &  Therapy  [address]  P: [phone]  F: [phone]     Physical Therapy Daily Treatment Note    Date:  2024  Patient Name:  Clarisa Lopez   :  1960  MRN: [id number]  Physician: Dr. Nayana Mejias                     Insurance: BCBS, $40 copay, 60 visit hard max   Medical Diagnosis: irene knee pain                 Rehab Codes: M25.569  Onset date: 24                                      Next 's appt.: 5/15/24 (Abelardo), 24 (Magalie)  Visit# / total visits: 4/10   Cancels/No Shows: 0/0    Subjective:    Pain:  [x] Yes  [] No Location: bilateral knee and quads    Pain Rating: (0-10 scale) -/10  Pain altered Tx:  [] No  [x] Yes  Action:  Comments: Pt came in today with

## 2024-05-30 NOTE — PROGRESS NOTES
General FM note    Clarisa Lopez is a 64 y.o. female who presents today for follow up on her  medical conditions as noted below.  Clarisa Lopez is c/o of   Chief Complaint   Patient presents with    Anxiety     Med check       Patient Active Problem List:     Intractable chronic migraine without aura and without status migrainosus     Essential hypertension     Past Medical History:   Diagnosis Date    Acid reflux     Anemia     Hypertension     Migraine     Mitral valve prolapse     Osteoarthritis       Past Surgical History:   Procedure Laterality Date     SECTION      COLONOSCOPY      COLONOSCOPY  2018    Screening; with 1 biopsy    COLONOSCOPY N/A 2018    COLONOSCOPY WITH BIOPSY performed by Bonnie Pool MD at [facility] OR    ENDOSCOPY, COLON, DIAGNOSTIC      OTHER SURGICAL HISTORY      uterine ablation    OVARY REMOVAL      TOE SURGERY       Family History   Problem Relation Age of Onset    Neuropathy Mother     Migraines Mother     Heart Attack Father      Current Outpatient Medications   Medication Sig Dispense Refill    spironolactone (ALDACTONE) 25 MG tablet Take 1 tablet by mouth daily 90 tablet 1    naratriptan (AMERGE) 2.5 MG tablet Take 1 tablet by mouth See Admin Instructions Take every 12 hours for 3 days. 6 tablet 0    clonazePAM (KLONOPIN) 0.5 MG tablet TAKE 1 TABLET BY MOUTH 2 TIMES A DAY AS NEEDED FOR ANXIETY *MAX DAILY: 2 TABLETS* 60 tablet 0    Multiple Vitamin (MULTIVITAMIN ADULT PO) Take by mouth      rimegepant sulfate 75 MG TBDP Take 75 mg by mouth daily as needed (headache) 16 tablet 11    RABEprazole (ACIPHEX) 20 MG tablet TAKE 1 TABLET BY MOUTH DAILY 90 tablet 3    valsartan (DIOVAN) 160 MG tablet Take 1 tablet by mouth daily 90 tablet 1    DULoxetine (CYMBALTA) 20 MG extended release capsule Take 1 capsule by mouth daily 90 capsule 1    SUMAtriptan (IMITREX) 100 MG tablet TAKE 1 TABLET BY MOUTH DAILY AS NEEDED FOR MIGRAINE 12 tablet 3    pravastatin

## 2024-06-02 DIAGNOSIS — F41.9 ANXIETY: ICD-10-CM

## 2024-06-03 RX ORDER — DULOXETIN HYDROCHLORIDE 20 MG/1
20 CAPSULE, DELAYED RELEASE ORAL DAILY
Qty: 30 CAPSULE | Refills: 2 | Status: SHIPPED | OUTPATIENT
Start: 2024-06-03

## 2024-06-12 DIAGNOSIS — I10 ESSENTIAL HYPERTENSION: ICD-10-CM

## 2024-06-12 DIAGNOSIS — G43.711 INTRACTABLE CHRONIC MIGRAINE WITHOUT AURA AND WITH STATUS MIGRAINOSUS: ICD-10-CM

## 2024-06-12 RX ORDER — VERAPAMIL HYDROCHLORIDE 240 MG/1
240 TABLET, FILM COATED, EXTENDED RELEASE ORAL DAILY
Qty: 90 TABLET | Refills: 0 | Status: SHIPPED | OUTPATIENT
Start: 2024-06-12 | End: 2024-09-10

## 2024-06-12 NOTE — TELEPHONE ENCOUNTER
Clarisa Lopez is calling to request a refill on the following medication(s):    Medication Request:  Requested Prescriptions     Pending Prescriptions Disp Refills    verapamil (CALAN SR) 240 MG extended release tablet 90 tablet 0     Sig: Take 1 tablet by mouth daily       Last Visit Date (If Applicable):  5/1/24    Next Visit Date:    Visit date not found

## 2024-06-13 RX ORDER — VERAPAMIL HYDROCHLORIDE 240 MG/1
TABLET, FILM COATED, EXTENDED RELEASE ORAL
Qty: 90 TABLET | Refills: 0 | OUTPATIENT
Start: 2024-06-13

## 2024-06-14 ENCOUNTER — HOSPITAL ENCOUNTER (OUTPATIENT)
Dept: PHYSICAL THERAPY | Facility: CLINIC | Age: 64
Setting detail: THERAPIES SERIES
Discharge: HOME | End: 2024-06-14
Payer: COMMERCIAL

## 2024-06-14 PROCEDURE — 97110 THERAPEUTIC EXERCISES: CPT

## 2024-06-14 NOTE — FLOWSHEET NOTE
[x] [facility]  Outpatient Rehabilitation &  Therapy  [address]  P: [phone]  F: [phone] [x] [facility]  Outpatient Rehabilitation &  Therapy  [address]  [phone]  [phone]     Physical Therapy Daily Treatment Note    Date:  2024  Patient Name:  Clarisa Lopez   :  1960  MRN: [id number]  Physician: Dr. Nayana Mejias                     Insurance: [employer], $40 copay, 60 visit hard max   Medical Diagnosis: Fred knee pain                 Rehab Codes: M25.569  Onset date: 24                                      Next 's appt.: Szepmatthias   Visit# / total visits: 5/10   Cancels/No Shows: 0/0    Subjective:    Pain:  [x] Yes  [] No Location: R knee    Pain Rating: (0-10 scale) 0/10 now; 3-4/10 at the worst since last visit. (After a run)  Pain altered Tx:  [] No  [x] Yes  Action:  Comments: Pt came in today with antalgic gait. She walk/ran 2'/2' x 3 and did ok during the run, but had increased pain the following day.  She continues to note discomfort with sit/ the R knee.  L knee feels fine.     Objective:  Modalities: none  Precautions: standard  Exercises:  Exercise Reps/ Time completed Weight/ Level Comments          Supine       Oral stretch 2x30\"      bridges 2x10   1 legged   SAQ 2x10  7.5 lbs    Prone       Quad sets 20x3\"  7.5 lbs R legged   Sidelying       Susan hip abd 2x10  2 lbs Towel behind foot   Clamshells  2x  fatigue  MRE  Ball under foot    Gym       TKE  20x5\"  Blue  Issued blue TB   TG R squats 2x15  L12    TG R lat squats 2x15  L12    Monster walks 4x20'  black Has a black TB at home   Calf raises 2x10  Off step Alternate sides   Lunges-post 15   // bars   3 way hip  10x2   Blue bilateral   Step downs 2x10  6\" Posterior and lateral    Calf stretch on SB 3x30\"  L4           Alter G walk/run 3'/2x 6  X 3.0/5.5 Carriage 8  XS shorts  70%   Other:      Treatment Charges: Mins Units   []  Modalities     [x]

## 2024-06-17 RX ORDER — TIZANIDINE 2 MG/1
TABLET ORAL
Qty: 90 TABLET | Refills: 1 | Status: SHIPPED | OUTPATIENT
Start: 2024-06-17

## 2024-06-28 ENCOUNTER — HOSPITAL ENCOUNTER (OUTPATIENT)
Dept: PHYSICAL THERAPY | Facility: CLINIC | Age: 64
Setting detail: THERAPIES SERIES
Discharge: HOME | End: 2024-06-28
Payer: COMMERCIAL

## 2024-06-28 NOTE — FLOWSHEET NOTE
[] [facility]  Outpatient Rehabilitation &  Therapy  [address]  [phone]  [phone] [] [facility]  Outpatient Rehabilitation &  Therapy  [address]  P: [phone]  F: [phone] [] [facility]  Outpatient Rehabilitation &  Therapy  [address]  P: [phone]  F: [phone] [] [facility]  Outpatient Rehabilitation &  Therapy  [address]  [phone]  [phone] [] [facility]  Outpatient Rehabilitation &  Therapy  [address]   P: [phone]  F: [phone]  [] [facility]  Outpatient Rehabilitation &  Therapy  [address]  P: [phone]  F: [phone] [] [facility]  Outpatient Rehabilitation &  Therapy  [address]  P: [phone]  F: [phone] [] [facility]  Outpatient Rehabilitation &  Therapy  [address]  P: [phone]  F: [phone] [] [facility]  Outpatient Rehabilitation &  Therapy  [address]  P: [phone]  F: [phone]  [] [facility] Outpatient Rehabilitation &  Therapy  [address]  P: [phone]  F: [phone]     Therapy Cancel/No Show note    Date: 2024  Patient: Clarisa Lopez  : 1960  MRN: [id number]    Cancels/No Shows to date: 0    For today's appointment patient:    [x]  Cancelled    [] Rescheduled appointment    [] No-show     Reason given by patient:    [x]  Patient ill    []  Conflicting appointment    [] No transportation      [] Conflict with work    [] No reason given    [] Weather related    [] COVID-19    [] Other:      Comments:        [] Next appointment was confirmed    Electronically signed by: José Miguel Neville

## 2024-07-17 RX ORDER — SUMATRIPTAN 100 MG/1
100 TABLET, FILM COATED ORAL DAILY PRN
Qty: 12 TABLET | Refills: 3 | Status: SHIPPED | OUTPATIENT
Start: 2024-07-17

## 2024-07-24 ENCOUNTER — TELEPHONE (OUTPATIENT)
Dept: NEUROLOGY | Age: 64
End: 2024-07-24

## 2024-07-24 DIAGNOSIS — G43.711 INTRACTABLE CHRONIC MIGRAINE WITHOUT AURA AND WITH STATUS MIGRAINOSUS: Primary | ICD-10-CM

## 2024-07-24 NOTE — TELEPHONE ENCOUNTER
Received a call from the patient stating that she is getting more frequent migraines for the past 4-5 days. She states that she has been experiencing more stress, and this seems to be the cause. She states that nothing she takes is helping, and that she has taken Imitrex, Fioricet, ibuprofen, and Nurtec with no relief. Please advise.

## 2024-07-26 RX ORDER — METHYLPREDNISOLONE 4 MG/1
TABLET ORAL
Qty: 1 KIT | Refills: 0 | Status: SHIPPED | OUTPATIENT
Start: 2024-07-26 | End: 2024-08-01

## 2024-07-26 NOTE — TELEPHONE ENCOUNTER
Patient called back into office. She is asking if a steroid taper is an option to help break the headache that she currently has. She said that it has helped in the past. If so, she would like for it to go to [facility] pharmacy in [place]. Please advise.    She said that she is not sure if she wants to try eletriptan again. She said that it worked while she was taking it but she thinks the Ajovy is not working like it was anymore. She would like to further discuss her options. Patient scheduled for virtual visit on 8/8/2024.

## 2024-08-05 DIAGNOSIS — G43.719 INTRACTABLE CHRONIC MIGRAINE WITHOUT AURA AND WITHOUT STATUS MIGRAINOSUS: ICD-10-CM

## 2024-08-05 RX ORDER — CLONAZEPAM 0.5 MG/1
TABLET ORAL
Qty: 60 TABLET | Refills: 0 | Status: SHIPPED | OUTPATIENT
Start: 2024-08-05 | End: 2024-09-04

## 2024-08-05 NOTE — TELEPHONE ENCOUNTER
Clarisa Lopez is calling to request a refill on the following medication(s):    Last Visit Date (If Applicable):  5/30/2024    Next Visit Date:    9/3/2024    Medication Request:  Requested Prescriptions     Pending Prescriptions Disp Refills    clonazePAM (KLONOPIN) 0.5 MG tablet 60 tablet 0     Sig: TAKE 1 TABLET BY MOUTH 2 TIMES A DAY AS NEEDED FOR ANXIETY *MAX DAILY: 2 TABLETS*

## 2024-08-07 NOTE — PROGRESS NOTES
[facility] NEUROLOGY SPECIALIST  [address]  [place] [address]  Dept: [phone]  TELEHEALTH EVALUATION -- Audio/Visual    HPI:  Clarisa Lopez is a 64 y.o. female with a history of HTN, anemia, mitral valve prolapse, and OA, who presented initially on 1/24/2024 for evaluation of intractable headache.  Her history is summarized as follows:      Ms. Lopez is planning to do NEETU in February for neck pain that may be triggering the headaches.  She is currently using sumatriptan daily, most often in the evening. She also takes ibuprofen daily and is using diphenydramine to help her sleep at night.  She is taking a magnesium oxide supplement with 400 mg of this supplement daily as well. She enjoys running and is doing marathon training.  She typically has a headache when she starts running, but she pushes herself to go.  Headaches are milder or less noticeable during activity, but return when she stops.      Ms. Lopez tolerates verapamil ok, but does have to take a diuretic to manage leg swelling.  She notes that her home blood pressure has been elevated recently, in addition to today's blood pressure.     She has been seen by Dr. Chip Oreilly at [facility] as recently as 1/8/2024.  His notes are summarized as follows:      63-year-old right-handed white female with a past medical history significant for hypertension, mitral valve prolapse, increased hemoglobin,.gastroparesis and dyslipidemia who states that she has been troubled by headaches \"all my life\". More recently in the last 9 months they have been particularly problematic occurring virtually on a daily basis. Her headaches have never been associated with any neurologic symptomatology and their current troublesome nature follows a period beginning 2 - 3 years ago when she started using a natural remedy of some type which was very helpful and more precisely she did well for about a year or so. My understanding is at that

## 2024-08-08 ENCOUNTER — TELEPHONE (OUTPATIENT)
Dept: NEUROLOGY | Age: 64
End: 2024-08-08

## 2024-08-08 ENCOUNTER — TELEMEDICINE (OUTPATIENT)
Dept: NEUROLOGY | Age: 64
End: 2024-08-08
Payer: COMMERCIAL

## 2024-08-08 DIAGNOSIS — G43.711 INTRACTABLE CHRONIC MIGRAINE WITHOUT AURA AND WITH STATUS MIGRAINOSUS: Primary | ICD-10-CM

## 2024-08-08 DIAGNOSIS — G43.711 INTRACTABLE CHRONIC MIGRAINE WITHOUT AURA AND WITH STATUS MIGRAINOSUS: ICD-10-CM

## 2024-08-08 DIAGNOSIS — I10 ESSENTIAL HYPERTENSION: ICD-10-CM

## 2024-08-08 DIAGNOSIS — G44.40 MEDICATION OVERUSE HEADACHE: ICD-10-CM

## 2024-08-08 PROCEDURE — 99214 OFFICE O/P EST MOD 30 MIN: CPT | Performed by: PSYCHIATRY & NEUROLOGY

## 2024-08-08 RX ORDER — NARATRIPTAN 2.5 MG/1
2.5 TABLET ORAL 2 TIMES DAILY
Qty: 6 TABLET | Refills: 0 | Status: SHIPPED | OUTPATIENT
Start: 2024-08-08 | End: 2024-08-11

## 2024-08-08 RX ORDER — GALCANEZUMAB 120 MG/ML
120 INJECTION, SOLUTION SUBCUTANEOUS
Qty: 1 ML | Refills: 11 | Status: SHIPPED | OUTPATIENT
Start: 2024-08-26

## 2024-08-08 NOTE — TELEPHONE ENCOUNTER
Received a fax from pharmacy stating Emgality needs PA. This was completed in EPIC.  Approved from 8/8/2024 to 8/8/2025   Spoke with patient she verbally understood

## 2024-08-09 RX ORDER — VERAPAMIL HYDROCHLORIDE 240 MG/1
240 TABLET, FILM COATED, EXTENDED RELEASE ORAL DAILY
Qty: 90 TABLET | Refills: 0 | Status: SHIPPED | OUTPATIENT
Start: 2024-08-09 | End: 2024-11-07

## 2024-08-09 RX ORDER — VALSARTAN 160 MG/1
160 TABLET ORAL DAILY
Qty: 90 TABLET | Refills: 3 | Status: SHIPPED | OUTPATIENT
Start: 2024-08-09

## 2024-08-09 NOTE — TELEPHONE ENCOUNTER
Pharmacy requesting refill of Verapamil 240 MG .      Medication active on med list yes      Date of last Rx: 06/12/2024 #90 with 0 refills          verified by CHILO SALAZAR      Date of last appointment 08/08/2024    Next Visit Date:  09/19/2024

## 2024-09-03 ENCOUNTER — OFFICE VISIT (OUTPATIENT)
Dept: FAMILY MEDICINE CLINIC | Age: 64
End: 2024-09-03
Payer: COMMERCIAL

## 2024-09-03 VITALS
DIASTOLIC BLOOD PRESSURE: 77 MMHG | SYSTOLIC BLOOD PRESSURE: 116 MMHG | HEART RATE: 76 BPM | WEIGHT: 109.6 LBS | BODY MASS INDEX: 18.24 KG/M2 | OXYGEN SATURATION: 100 % | TEMPERATURE: 97.9 F

## 2024-09-03 DIAGNOSIS — Z13.220 SCREENING FOR HYPERLIPIDEMIA: ICD-10-CM

## 2024-09-03 DIAGNOSIS — D75.1 ERYTHROCYTOSIS: ICD-10-CM

## 2024-09-03 DIAGNOSIS — Z13.6 ENCOUNTER FOR LIPID SCREENING FOR CARDIOVASCULAR DISEASE: ICD-10-CM

## 2024-09-03 DIAGNOSIS — I34.0 NONRHEUMATIC MITRAL VALVE REGURGITATION: ICD-10-CM

## 2024-09-03 DIAGNOSIS — Z13.220 ENCOUNTER FOR LIPID SCREENING FOR CARDIOVASCULAR DISEASE: ICD-10-CM

## 2024-09-03 DIAGNOSIS — Z00.00 ENCOUNTER FOR WELL ADULT EXAM WITHOUT ABNORMAL FINDINGS: Primary | ICD-10-CM

## 2024-09-03 DIAGNOSIS — Z13.1 DIABETES MELLITUS SCREENING: ICD-10-CM

## 2024-09-03 DIAGNOSIS — I10 ESSENTIAL HYPERTENSION: ICD-10-CM

## 2024-09-03 PROCEDURE — 3074F SYST BP LT 130 MM HG: CPT | Performed by: FAMILY MEDICINE

## 2024-09-03 PROCEDURE — 3078F DIAST BP <80 MM HG: CPT | Performed by: FAMILY MEDICINE

## 2024-09-03 PROCEDURE — 99396 PREV VISIT EST AGE 40-64: CPT | Performed by: FAMILY MEDICINE

## 2024-09-03 SDOH — ECONOMIC STABILITY: INCOME INSECURITY: HOW HARD IS IT FOR YOU TO PAY FOR THE VERY BASICS LIKE FOOD, HOUSING, MEDICAL CARE, AND HEATING?: NOT HARD AT ALL

## 2024-09-03 SDOH — ECONOMIC STABILITY: FOOD INSECURITY: WITHIN THE PAST 12 MONTHS, THE FOOD YOU BOUGHT JUST DIDN'T LAST AND YOU DIDN'T HAVE MONEY TO GET MORE.: NEVER TRUE

## 2024-09-03 SDOH — ECONOMIC STABILITY: FOOD INSECURITY: WITHIN THE PAST 12 MONTHS, YOU WORRIED THAT YOUR FOOD WOULD RUN OUT BEFORE YOU GOT MONEY TO BUY MORE.: NEVER TRUE

## 2024-09-03 NOTE — PROGRESS NOTES
SpO2 100%   BMI 18.24 kg/m²   Constitutional: Alert and oriented.  Well-nourished.  Head: Normocephalic and atraumatic.   Right Ear: External ear normal. TM: no bulging, erythema or fluid seen.  Left Ear: External ear normal. TM: no bulging, erythema or fluid seen.  Nose: Nose normal.   Mouth/Throat: Oropharynx is clear and moist.  Teeth in good repair.  Eyes: Pupils are equal, round, and reactive to light. Right eye exhibits no discharge. Left eye exhibits no discharge. No scleral icterus.   Neck: Normal range of motion. Neck supple. No JVD present. No tracheal deviation present. No thyromegaly present.   Cardiovascular: Normal rate, regular rhythm, normal heart sounds.   Pulmonary/Chest: Effort normal and breath sounds normal. No respiratory distress.  She has no wheezes.  She has no rales.   Abdominal: Soft. Bowel sounds are normal.  She exhibits no distension and no mass. There is no tenderness. There is no rebound and no guarding.   Musculoskeletal: Normal range of motion.  She exhibits no edema or tenderness.   Lymphadenopathy:    She has no cervical adenopathy.   Neurological:  She is alert and oriented to person, place, and time. Cranial nerves grossly intact. No sensation problem noted. Muscle strength 5/5 throughout.  Skin: Skin is warm and dry. No rash noted. No erythema.   Psychiatric:  She has a normal mood and affect. Behavior is normal.          Assessment & Plan   Encounter for well adult exam without abnormal findings  -     Hemoglobin A1C - Lab draw; Future  -     TSH with Reflex; Future  -     CBC with Auto Differential; Future  Essential hypertension  -     Lipid Panel; Future  Nonrheumatic mitral valve regurgitation  -     Lipid Panel; Future  -     Hemoglobin A1C - Lab draw; Future  -     TSH with Reflex; Future  -     CBC with Auto Differential; Future  Encounter for lipid screening for cardiovascular disease  -     Lipid Panel; Future  Diabetes mellitus screening  -     Hemoglobin A1C - Lab

## 2024-09-04 DIAGNOSIS — I10 ESSENTIAL HYPERTENSION: ICD-10-CM

## 2024-09-04 DIAGNOSIS — F41.9 ANXIETY: ICD-10-CM

## 2024-09-04 DIAGNOSIS — G43.711 INTRACTABLE CHRONIC MIGRAINE WITHOUT AURA AND WITH STATUS MIGRAINOSUS: ICD-10-CM

## 2024-09-04 RX ORDER — DULOXETIN HYDROCHLORIDE 20 MG/1
20 CAPSULE, DELAYED RELEASE ORAL DAILY
Qty: 90 CAPSULE | Refills: 3 | Status: SHIPPED | OUTPATIENT
Start: 2024-09-04

## 2024-09-04 RX ORDER — SPIRONOLACTONE 25 MG/1
25 TABLET ORAL DAILY
Qty: 90 TABLET | Refills: 3 | Status: SHIPPED | OUTPATIENT
Start: 2024-09-04

## 2024-09-04 RX ORDER — VERAPAMIL HYDROCHLORIDE 240 MG/1
240 TABLET, FILM COATED, EXTENDED RELEASE ORAL DAILY
Qty: 90 TABLET | Refills: 3 | OUTPATIENT
Start: 2024-09-04 | End: 2024-12-03

## 2024-09-06 ENCOUNTER — HOSPITAL ENCOUNTER (OUTPATIENT)
Facility: CLINIC | Age: 64
Discharge: HOME | End: 2024-09-06
Payer: COMMERCIAL

## 2024-09-06 DIAGNOSIS — Z00.00 ENCOUNTER FOR WELL ADULT EXAM WITHOUT ABNORMAL FINDINGS: ICD-10-CM

## 2024-09-06 DIAGNOSIS — I34.0 NONRHEUMATIC MITRAL VALVE REGURGITATION: ICD-10-CM

## 2024-09-06 DIAGNOSIS — I10 ESSENTIAL HYPERTENSION: ICD-10-CM

## 2024-09-06 DIAGNOSIS — Z13.6 ENCOUNTER FOR LIPID SCREENING FOR CARDIOVASCULAR DISEASE: ICD-10-CM

## 2024-09-06 DIAGNOSIS — Z13.220 ENCOUNTER FOR LIPID SCREENING FOR CARDIOVASCULAR DISEASE: ICD-10-CM

## 2024-09-06 DIAGNOSIS — Z13.1 DIABETES MELLITUS SCREENING: ICD-10-CM

## 2024-09-06 DIAGNOSIS — Z13.220 SCREENING FOR HYPERLIPIDEMIA: ICD-10-CM

## 2024-09-06 LAB
BASOPHILS # BLD: <0.03 K/UL (ref 0–0.2)
BASOPHILS NFR BLD: 0 % (ref 0–2)
CHOLEST SERPL-MCNC: 164 MG/DL (ref 0–199)
CHOLESTEROL/HDL RATIO: 3
EOSINOPHIL # BLD: 0.06 K/UL (ref 0–0.44)
EOSINOPHILS RELATIVE PERCENT: 1 % (ref 1–4)
ERYTHROCYTE [DISTWIDTH] IN BLOOD BY AUTOMATED COUNT: 12.8 % (ref 11.8–14.4)
EST. AVERAGE GLUCOSE BLD GHB EST-MCNC: 111 MG/DL
HBA1C MFR BLD: 5.5 % (ref 4–6)
HCT VFR BLD AUTO: 45.7 % (ref 36.3–47.1)
HDLC SERPL-MCNC: 48 MG/DL
HGB BLD-MCNC: 15 G/DL (ref 11.9–15.1)
IMM GRANULOCYTES # BLD AUTO: 0.04 K/UL (ref 0–0.3)
IMM GRANULOCYTES NFR BLD: 1 %
LDLC SERPL CALC-MCNC: 94 MG/DL (ref 0–100)
LYMPHOCYTES NFR BLD: 3.21 K/UL (ref 1.1–3.7)
LYMPHOCYTES RELATIVE PERCENT: 40 % (ref 24–43)
MCH RBC QN AUTO: 31.4 PG (ref 25.2–33.5)
MCHC RBC AUTO-ENTMCNC: 32.8 G/DL (ref 28.4–34.8)
MCV RBC AUTO: 95.8 FL (ref 82.6–102.9)
MONOCYTES NFR BLD: 0.99 K/UL (ref 0.1–1.2)
MONOCYTES NFR BLD: 12 % (ref 3–12)
NEUTROPHILS NFR BLD: 46 % (ref 36–65)
NEUTS SEG NFR BLD: 3.79 K/UL (ref 1.5–8.1)
NRBC BLD-RTO: 0 PER 100 WBC
PLATELET # BLD AUTO: 304 K/UL (ref 138–453)
PMV BLD AUTO: 11 FL (ref 8.1–13.5)
RBC # BLD AUTO: 4.77 M/UL (ref 3.95–5.11)
TRIGL SERPL-MCNC: 111 MG/DL
TSH SERPL DL<=0.05 MIU/L-ACNC: 2.03 UIU/ML (ref 0.27–4.2)
VLDLC SERPL CALC-MCNC: 22 MG/DL
WBC OTHER # BLD: 8.1 K/UL (ref 3.5–11.3)

## 2024-09-06 PROCEDURE — 36415 COLL VENOUS BLD VENIPUNCTURE: CPT

## 2024-09-06 PROCEDURE — 85025 COMPLETE CBC W/AUTO DIFF WBC: CPT

## 2024-09-06 PROCEDURE — 84443 ASSAY THYROID STIM HORMONE: CPT

## 2024-09-06 PROCEDURE — 83036 HEMOGLOBIN GLYCOSYLATED A1C: CPT

## 2024-09-06 PROCEDURE — 80061 LIPID PANEL: CPT

## 2024-09-07 DIAGNOSIS — I10 ESSENTIAL HYPERTENSION: ICD-10-CM

## 2024-09-07 DIAGNOSIS — G43.711 INTRACTABLE CHRONIC MIGRAINE WITHOUT AURA AND WITH STATUS MIGRAINOSUS: ICD-10-CM

## 2024-09-09 RX ORDER — VERAPAMIL HYDROCHLORIDE 240 MG/1
240 TABLET, FILM COATED, EXTENDED RELEASE ORAL DAILY
Qty: 90 TABLET | Refills: 0 | OUTPATIENT
Start: 2024-09-09 | End: 2024-12-08

## 2024-09-13 ENCOUNTER — CLINICAL DOCUMENTATION (OUTPATIENT)
Dept: PHYSICAL THERAPY | Facility: CLINIC | Age: 64
End: 2024-09-13

## 2024-09-13 DIAGNOSIS — G43.719 INTRACTABLE CHRONIC MIGRAINE WITHOUT AURA AND WITHOUT STATUS MIGRAINOSUS: ICD-10-CM

## 2024-09-13 RX ORDER — CLONAZEPAM 0.5 MG/1
TABLET ORAL
Qty: 60 TABLET | Refills: 0 | Status: SHIPPED | OUTPATIENT
Start: 2024-09-13 | End: 2024-10-13

## 2024-09-19 ENCOUNTER — TELEMEDICINE (OUTPATIENT)
Dept: NEUROLOGY | Age: 64
End: 2024-09-19
Payer: COMMERCIAL

## 2024-09-19 DIAGNOSIS — G43.719 INTRACTABLE CHRONIC MIGRAINE WITHOUT AURA AND WITHOUT STATUS MIGRAINOSUS: Primary | ICD-10-CM

## 2024-09-19 DIAGNOSIS — G44.40 MEDICATION OVERUSE HEADACHE: ICD-10-CM

## 2024-09-19 PROCEDURE — 99213 OFFICE O/P EST LOW 20 MIN: CPT | Performed by: PSYCHIATRY & NEUROLOGY

## 2024-10-14 DIAGNOSIS — I10 ESSENTIAL HYPERTENSION: ICD-10-CM

## 2024-10-14 DIAGNOSIS — G43.711 INTRACTABLE CHRONIC MIGRAINE WITHOUT AURA AND WITH STATUS MIGRAINOSUS: ICD-10-CM

## 2024-10-15 RX ORDER — VERAPAMIL HYDROCHLORIDE 240 MG/1
240 TABLET, FILM COATED, EXTENDED RELEASE ORAL DAILY
Qty: 90 TABLET | Refills: 3 | OUTPATIENT
Start: 2024-10-15 | End: 2025-01-13

## 2024-10-16 DIAGNOSIS — G43.719 INTRACTABLE CHRONIC MIGRAINE WITHOUT AURA AND WITHOUT STATUS MIGRAINOSUS: ICD-10-CM

## 2024-10-16 RX ORDER — CLONAZEPAM 0.5 MG/1
TABLET ORAL
Qty: 60 TABLET | Refills: 0 | Status: SHIPPED | OUTPATIENT
Start: 2024-10-16 | End: 2024-11-15

## 2024-10-16 NOTE — TELEPHONE ENCOUNTER
Clarisa Lopez is calling to request a refill on the following medication(s):    Last Visit Date (If Applicable):  Visit date not found    Next Visit Date:    Visit date not found    Medication Request:  Requested Prescriptions     Pending Prescriptions Disp Refills    clonazePAM (KLONOPIN) 0.5 MG tablet 60 tablet 0     Sig: TAKE 1 TABLET BY MOUTH 2 TIMES A DAY AS NEEDED FOR ANXIETY **MAX 2 PER DAY**

## 2024-11-13 DIAGNOSIS — G43.719 INTRACTABLE CHRONIC MIGRAINE WITHOUT AURA AND WITHOUT STATUS MIGRAINOSUS: ICD-10-CM

## 2024-11-13 RX ORDER — CLONAZEPAM 0.5 MG/1
TABLET ORAL
Qty: 60 TABLET | Refills: 0 | Status: SHIPPED | OUTPATIENT
Start: 2024-11-13 | End: 2024-12-13

## 2024-11-26 DIAGNOSIS — G43.711 INTRACTABLE CHRONIC MIGRAINE WITHOUT AURA AND WITH STATUS MIGRAINOSUS: ICD-10-CM

## 2024-11-26 DIAGNOSIS — I10 ESSENTIAL HYPERTENSION: ICD-10-CM

## 2024-11-26 RX ORDER — VERAPAMIL HYDROCHLORIDE 240 MG/1
240 TABLET, FILM COATED, EXTENDED RELEASE ORAL DAILY
Qty: 90 TABLET | Refills: 1 | Status: SHIPPED | OUTPATIENT
Start: 2024-11-26 | End: 2025-11-26

## 2024-11-26 NOTE — TELEPHONE ENCOUNTER
Pharmacy requesting refill of verapamil 240 mg.      Medication active on med list yes      Date of last Rx: 8/9/2024 with 0 refills          verified by SB, RMA      Date of last appointment 9/19/2024    Next Visit Date:  12/11/2024

## 2024-12-03 ENCOUNTER — OFFICE VISIT (OUTPATIENT)
Dept: FAMILY MEDICINE CLINIC | Age: 64
End: 2024-12-03
Payer: COMMERCIAL

## 2024-12-03 VITALS
BODY MASS INDEX: 18.21 KG/M2 | SYSTOLIC BLOOD PRESSURE: 113 MMHG | TEMPERATURE: 97.5 F | WEIGHT: 109.4 LBS | OXYGEN SATURATION: 100 % | DIASTOLIC BLOOD PRESSURE: 71 MMHG | HEART RATE: 76 BPM

## 2024-12-03 DIAGNOSIS — M79.10 MYALGIA: ICD-10-CM

## 2024-12-03 DIAGNOSIS — T14.8XXA MUSCLE STRAIN: ICD-10-CM

## 2024-12-03 DIAGNOSIS — I10 ESSENTIAL HYPERTENSION: Primary | ICD-10-CM

## 2024-12-03 DIAGNOSIS — M25.471 ANKLE SWELLING, RIGHT: ICD-10-CM

## 2024-12-03 PROCEDURE — 3078F DIAST BP <80 MM HG: CPT | Performed by: FAMILY MEDICINE

## 2024-12-03 PROCEDURE — 99214 OFFICE O/P EST MOD 30 MIN: CPT | Performed by: FAMILY MEDICINE

## 2024-12-03 PROCEDURE — 3074F SYST BP LT 130 MM HG: CPT | Performed by: FAMILY MEDICINE

## 2024-12-03 RX ORDER — DULOXETIN HYDROCHLORIDE 30 MG/1
30 CAPSULE, DELAYED RELEASE ORAL DAILY
Qty: 90 CAPSULE | Refills: 0 | Status: SHIPPED | OUTPATIENT
Start: 2024-12-03

## 2024-12-03 RX ORDER — SUMATRIPTAN SUCCINATE 100 MG/1
100 TABLET ORAL DAILY PRN
Qty: 12 TABLET | Refills: 3 | Status: SHIPPED | OUTPATIENT
Start: 2024-12-03

## 2024-12-03 ASSESSMENT — PATIENT HEALTH QUESTIONNAIRE - PHQ9
SUM OF ALL RESPONSES TO PHQ QUESTIONS 1-9: 0
SUM OF ALL RESPONSES TO PHQ9 QUESTIONS 1 & 2: 0
2. FEELING DOWN, DEPRESSED OR HOPELESS: NOT AT ALL
1. LITTLE INTEREST OR PLEASURE IN DOING THINGS: NOT AT ALL

## 2024-12-03 NOTE — PROGRESS NOTES
General FM note    Clarisa Lopez is a 64 y.o. female who presents today for follow up on her  medical conditions as noted below.  Clarisa Lopez is c/o of   Chief Complaint   Patient presents with    Hypertension       Patient Active Problem List:     Intractable chronic migraine without aura and without status migrainosus     Essential hypertension     Erythrocytosis     Past Medical History:   Diagnosis Date    Acid reflux     Anemia     Hypertension     Migraine     Mitral valve prolapse     Osteoarthritis       Past Surgical History:   Procedure Laterality Date     SECTION      COLONOSCOPY      COLONOSCOPY  2018    Screening; with 1 biopsy    COLONOSCOPY N/A 2018    COLONOSCOPY WITH BIOPSY performed by Bonnie Pool MD at [facility] OR    ENDOSCOPY, COLON, DIAGNOSTIC      OTHER SURGICAL HISTORY      uterine ablation    OVARY REMOVAL      TOE SURGERY       Family History   Problem Relation Age of Onset    Neuropathy Mother     Migraines Mother     Heart Attack Father      Current Outpatient Medications   Medication Sig Dispense Refill    SUMAtriptan (IMITREX) 100 MG tablet TAKE 1 TABLET BY MOUTH DAILY AS NEEDED FOR MIGRAINE 12 tablet 3    DULoxetine (CYMBALTA) 30 MG extended release capsule Take 1 capsule by mouth daily 90 capsule 0    verapamil (CALAN SR) 240 MG extended release tablet Take 1 tablet by mouth daily 90 tablet 1    clonazePAM (KLONOPIN) 0.5 MG tablet TAKE 1 TABLET BY MOUTH 2 TIMES A DAY AS NEEDED FOR ANXIETY **MAX 2 PER DAY** 60 tablet 0    spironolactone (ALDACTONE) 25 MG tablet TAKE 1 TABLET BY MOUTH DAILY 90 tablet 3    valsartan (DIOVAN) 160 MG tablet TAKE 1 TABLET BY MOUTH DAILY 90 tablet 3    Galcanezumab-gnlm (EMGALITY) 120 MG/ML SOAJ Inject 120 mg into the skin every 30 days 1 mL 11    tiZANidine (ZANAFLEX) 2 MG tablet TAKE 1 TABLET BY MOUTH EVERY DAY AT NIGHT 90 tablet 1    Multiple Vitamin (MULTIVITAMIN ADULT PO) Take by mouth      rimegepant sulfate 75 MG TBDP

## 2024-12-10 RX ORDER — TIZANIDINE 2 MG/1
TABLET ORAL
Qty: 90 TABLET | Refills: 1 | Status: SHIPPED | OUTPATIENT
Start: 2024-12-10

## 2024-12-10 NOTE — PROGRESS NOTES
[facility] NEUROLOGY SPECIALIST  [address]  [place] [address]  Dept: [phone]    PATIENT NAME: Clarisa Lopez  PATIENT MRN: [id number]  PRIMARY CARE PHYSICIAN: Nayana Mejias MD    HPI:      Clarisa Lopez is a 64 y.o. female with a history of HTN, anemia, mitral valve prolapse, and OA, who presented initially on 1/24/2024 for evaluation of intractable headache.  Her history is summarized as follows:      Ms. Lopez is planning to do NEETU in February for neck pain that may be triggering the headaches.  She is currently using sumatriptan daily, most often in the evening. She also takes ibuprofen daily and is using diphenydramine to help her sleep at night.  She is taking a magnesium oxide supplement with 400 mg of this supplement daily as well. She enjoys running and is doing marathon training.  She typically has a headache when she starts running, but she pushes herself to go.  Headaches are milder or less noticeable during activity, but return when she stops.      Ms. Lopez tolerates verapamil ok, but does have to take a diuretic to manage leg swelling.  She notes that her home blood pressure has been elevated recently, in addition to today's blood pressure.     She has been seen by Dr. Chip Oreilly at [facility] as recently as 1/8/2024.  His notes are summarized as follows:      63-year-old right-handed white female with a past medical history significant for hypertension, mitral valve prolapse, increased hemoglobin,.gastroparesis and dyslipidemia who states that she has been troubled by headaches \"all my life\". More recently in the last 9 months they have been particularly problematic occurring virtually on a daily basis. Her headaches have never been associated with any neurologic symptomatology and their current troublesome nature follows a period beginning 2 - 3 years ago when she started using a natural remedy of some type which was very helpful and more

## 2024-12-11 ENCOUNTER — OFFICE VISIT (OUTPATIENT)
Dept: NEUROLOGY | Age: 64
End: 2024-12-11
Payer: COMMERCIAL

## 2024-12-11 VITALS
DIASTOLIC BLOOD PRESSURE: 78 MMHG | HEIGHT: 65 IN | HEART RATE: 77 BPM | WEIGHT: 107.8 LBS | BODY MASS INDEX: 17.96 KG/M2 | SYSTOLIC BLOOD PRESSURE: 118 MMHG

## 2024-12-11 DIAGNOSIS — I10 ESSENTIAL HYPERTENSION: ICD-10-CM

## 2024-12-11 DIAGNOSIS — G44.40 MEDICATION OVERUSE HEADACHE: ICD-10-CM

## 2024-12-11 DIAGNOSIS — G43.711 INTRACTABLE CHRONIC MIGRAINE WITHOUT AURA AND WITH STATUS MIGRAINOSUS: Primary | ICD-10-CM

## 2024-12-11 PROCEDURE — 3074F SYST BP LT 130 MM HG: CPT | Performed by: PSYCHIATRY & NEUROLOGY

## 2024-12-11 PROCEDURE — 3078F DIAST BP <80 MM HG: CPT | Performed by: PSYCHIATRY & NEUROLOGY

## 2024-12-11 PROCEDURE — 99213 OFFICE O/P EST LOW 20 MIN: CPT | Performed by: PSYCHIATRY & NEUROLOGY

## 2024-12-11 RX ORDER — BENZONATATE 200 MG/1
200 CAPSULE ORAL 3 TIMES DAILY PRN
COMMUNITY
Start: 2024-12-06

## 2024-12-12 ENCOUNTER — HOSPITAL ENCOUNTER (OUTPATIENT)
Dept: PHYSICAL THERAPY | Facility: CLINIC | Age: 64
Setting detail: THERAPIES SERIES
Discharge: HOME | End: 2024-12-12

## 2024-12-12 NOTE — CONSULTS
[] [facility]  Outpatient Rehabilitation &  Therapy  [address]  P: [phone]  F: [phone] [x] [facility]  Outpatient Rehabilitation &  Therapy  [address]  P: [phone]  F: [phone]        Physical Therapy Running Evaluation    Date:  2024  Patient: Clarisa Lopez   : 1960  MRN: [id number]  Physician: Dr. Agapito Arroyo   Insurance:   Medical Diagnosis:    Rehab Codes:   Onset date:     Next 's appt.:    Subjective:   CC:  HPI:     PMHx: [] Unremarkable [] Diabetes [] HTN  [] Pacemaker   [] MI/Heart Problems [] Cancer [] Arthritis  [] Other:              [] Refer to full medical chart  In EPIC     Tests: [] X-Ray: [] MRI:  [] none:     Medications: [] Refer to full medical record [] None [] Other:  Allergies:      [] Refer to full medical record [] None [] Other:    Working:  [] Normal Duty  [] Light Duty  [] Off D/T Condition  [] Retired    [] Not Employed    []  Disability  [] Other:           Return to work:     Job/ADL Description:  School:  Next goal race:    Pain:  [] Yes  [] No   Location:   Pain Rating: (0-10 scale)   1.  2.   3.    Pain altered Tx:  [] Yes  [] No  Action:  Symptoms:  [] Improving [] Worsening [] Same  Better:  [] Meds    [] Ice pack    [] Sit    [] Not running  []Stand    [] Walk    [] Stretching   [] Other:  Worse: [] Run    [] Easy    [] Speed work    []Stand    [] Walk    [] Stairs    [] Sit    [] Other:  Sleep: [] OK    [] Disturbed    Objective:    ROM  ° A/P STRENGTH TESTS (+/-) Left Right Not Tested    Left Right Left Right Ant. Drawer   []   Hip Flex     Post. Drawer   []   Ext     Lachmans   []   ER     Valgus Stress   []   IR     Varus Stress   []   ABD     Surinder’s   []   ADD     Pat-Fem Grind   []   Knee Flex     FADIRs   []   Ext     Hip Scouring   []   Ankle DF     JOHN’s   []   PF     Piriformis   []   INV     Collette’s   []   EVER     Oral     []   GTE     Tico's   []       OBSERVATION No

## 2024-12-12 NOTE — FLOWSHEET NOTE
[] [facility]  Outpatient Rehabilitation &  Therapy  [address]  [phone]  [phone] [] [facility]  Outpatient Rehabilitation &  Therapy  [address]  P: [phone]  F: [phone] [] [facility]  Outpatient Rehabilitation &  Therapy  [address]  P: [phone]  F: [phone] [] [facility]  Outpatient Rehabilitation &  Therapy  [address]  [phone]  [phone] [] [facility]  Outpatient Rehabilitation &  Therapy  [address]   P: [phone]  F: [phone]  [] [facility]  Outpatient Rehabilitation &  Therapy  [address]  P: [phone]  F: [phone] [] [facility]  Outpatient Rehabilitation &  Therapy  [address]  P: [phone]  F: [phone] [] [facility]  Outpatient Rehabilitation &  Therapy  [address]  P: [phone]  F: [phone] [] [facility]  Outpatient Rehabilitation &  Therapy  [address]  P: [phone]  F: [phone]  [] [facility] Outpatient Rehabilitation &  Therapy  [address]  P: [phone]  F: [phone]     Therapy Cancel/No Show note    Date: 2024  Patient: Clarisa Lopez  : 1960  MRN: [id number]    Cancels/No Shows to date:     For today's appointment patient:    [x]  Cancelled    [x] Rescheduled appointment    [] No-show     Reason given by patient:    [x]  Patient ill    []  Conflicting appointment    [] No transportation      [] Conflict with work    [] No reason given    [] Weather related    [] COVID-19    [] Other:      Comments:        [x] Next appointment was confirmed    Electronically signed by: Rozina Erickson PT

## 2024-12-14 DIAGNOSIS — G43.719 INTRACTABLE CHRONIC MIGRAINE WITHOUT AURA AND WITHOUT STATUS MIGRAINOSUS: ICD-10-CM

## 2024-12-16 ENCOUNTER — HOSPITAL ENCOUNTER (OUTPATIENT)
Dept: ULTRASOUND IMAGING | Facility: CLINIC | Age: 64
Discharge: HOME | End: 2024-12-18
Payer: COMMERCIAL

## 2024-12-16 DIAGNOSIS — G43.719 INTRACTABLE CHRONIC MIGRAINE WITHOUT AURA AND WITHOUT STATUS MIGRAINOSUS: ICD-10-CM

## 2024-12-16 DIAGNOSIS — M25.471 ANKLE SWELLING, RIGHT: ICD-10-CM

## 2024-12-16 PROCEDURE — 93971 EXTREMITY STUDY: CPT

## 2024-12-16 RX ORDER — CLONAZEPAM 0.5 MG/1
TABLET ORAL
Qty: 60 TABLET | OUTPATIENT
Start: 2024-12-16

## 2024-12-16 RX ORDER — CLONAZEPAM 0.5 MG/1
TABLET ORAL
Qty: 60 TABLET | Refills: 0 | Status: SHIPPED | OUTPATIENT
Start: 2024-12-16 | End: 2025-01-15

## 2024-12-17 ENCOUNTER — OFFICE VISIT (OUTPATIENT)
Dept: FAMILY MEDICINE CLINIC | Age: 64
End: 2024-12-17

## 2024-12-17 ENCOUNTER — TELEPHONE (OUTPATIENT)
Dept: FAMILY MEDICINE CLINIC | Age: 64
End: 2024-12-17

## 2024-12-17 VITALS
WEIGHT: 108.6 LBS | OXYGEN SATURATION: 99 % | TEMPERATURE: 97.9 F | SYSTOLIC BLOOD PRESSURE: 138 MMHG | BODY MASS INDEX: 18.07 KG/M2 | HEART RATE: 89 BPM | DIASTOLIC BLOOD PRESSURE: 81 MMHG

## 2024-12-17 DIAGNOSIS — J20.8 ACUTE BRONCHITIS, VIRAL: Primary | ICD-10-CM

## 2024-12-17 RX ORDER — PREDNISONE 20 MG/1
20 TABLET ORAL DAILY
Qty: 4 TABLET | Refills: 0 | Status: SHIPPED | OUTPATIENT
Start: 2024-12-17 | End: 2024-12-21

## 2024-12-17 RX ORDER — BENZONATATE 200 MG/1
200 CAPSULE ORAL 3 TIMES DAILY PRN
Qty: 30 CAPSULE | Refills: 1 | Status: SHIPPED | OUTPATIENT
Start: 2024-12-17

## 2024-12-17 NOTE — PROGRESS NOTES
Tessalon Perles at night, which provides some relief, but her supply is nearly depleted. She expresses concern about her overall health, citing fatigue and a lack of motivation to engage in activities. She has not yet started her Augmentin treatment.    MEDICATIONS  Current: Tessalon Perles, Z-Daniel  Has not been taking Augmentin        Review of Systems   Constitutional: Negative for fever and unexpected weight change.   Pertinent items are noted in HPI.    Objective:   Physical Exam  Constitutional: VS (see above).   General appearance: normal development, habitus and attention, no deformities.  Mild distress.  Looks very fatigued.  Eyes: normal conjunctiva and lids.  CAV: RRR, no RMG. No edema lower extremities.  Pulmo: CTA bilateral, no CWR.  May as a slight crackling at her left lower lobe present.  Skin: no rashes, lesions or ulcers.  Musculoskeletal: normal gait. Nails: no clubbing or cyanosis.  Psychiatric: alert and oriented to place, time and person. Normal mood and affect.    Assessment:       Diagnosis Orders   1. Acute bronchitis, viral  XR CHEST STANDARD (2 VW)    benzonatate (TESSALON) 200 MG capsule    predniSONE (DELTASONE) 20 MG tablet    Budeson-Glycopyrrol-Formoterol (BREZTRI AEROSPHERE) 160-9-4.8 MCG/ACT AERO          Plan:   It appears that the patient has a bronchitis.  The patient was on a Z-Daniel she still has the cough going on.  No x-ray was done previously as well ordered.  She will continue Tessalon Perles I will give her a short course of prednisone but also an inhaler.  Hopefully this will help her.  I also discussed with her that she should rest at least 1 day to get some overall care for herself.  The patient will call if there are any changes concerns.  No follow-ups on file.  Orders Placed This Encounter   Procedures    XR CHEST STANDARD (2 VW)     Standing Status:   Future     Standing Expiration Date:   12/17/2025     Orders Placed This Encounter   Medications    benzonatate

## 2024-12-17 NOTE — TELEPHONE ENCOUNTER
Patient called her grandchildren had walking pneumonia so she went to urgent care on 12/06/24. Was given tessalon perles, augmentin, and xithromax, has been taking the tessalon perles and finished the xithromax, she is still not feeling better and is wanting to know if she should take the augmentin, or what else should she do. Also stating having aching joints. Stated that urgent care told her to stop the pravastatin to see of that would help the aching but she is still having achy joints. Please advise.

## 2024-12-18 DIAGNOSIS — J20.8 ACUTE BRONCHITIS, VIRAL: ICD-10-CM

## 2024-12-18 RX ORDER — BUDESONIDE, GLYCOPYRROLATE, AND FORMOTEROL FUMARATE 160; 9; 4.8 UG/1; UG/1; UG/1
AEROSOL, METERED RESPIRATORY (INHALATION)
Qty: 1 EACH | Refills: 0 | Status: SHIPPED | COMMUNITY
Start: 2024-12-18

## 2025-01-03 ENCOUNTER — TELEPHONE (OUTPATIENT)
Dept: FAMILY MEDICINE CLINIC | Age: 65
End: 2025-01-03

## 2025-01-03 DIAGNOSIS — M25.59 PAIN IN OTHER JOINT: Primary | ICD-10-CM

## 2025-01-03 NOTE — TELEPHONE ENCOUNTER
Additional blood work ordered.  If this gets worse please make an appointment to be seen in the office.  Does she have additional symptoms including rashes changes in bowel movement urination?

## 2025-01-03 NOTE — TELEPHONE ENCOUNTER
Patient called office to follow up on ongoing issue with muscle and joint aches, patient states that since stopping the pravastatin she is still experiencing discomfort

## 2025-01-04 ENCOUNTER — HOSPITAL ENCOUNTER (OUTPATIENT)
Facility: CLINIC | Age: 65
Discharge: HOME | End: 2025-01-04
Payer: COMMERCIAL

## 2025-01-04 DIAGNOSIS — M25.59 PAIN IN OTHER JOINT: ICD-10-CM

## 2025-01-04 LAB
CARDIOLIPIN IGA SER IA-ACNC: NORMAL APL
CARDIOLIPIN IGG SER IA-ACNC: NORMAL GPL
CARDIOLIPIN IGM SER IA-ACNC: NORMAL MPL
DILUTE RUSSELL VIPER VENOM TIME: NORMAL
ERYTHROCYTE [SEDIMENTATION RATE] IN BLOOD BY PHOTOMETRIC METHOD: 2 MM/HR (ref 0–30)
INR PPP: 1
LUPUS ANTICOAG: NORMAL
PARTIAL THROMBOPLASTIN TIME: 25 SEC (ref 21.3–31.3)
PROTHROMBIN TIME: 10.1 SEC (ref 9.4–12.6)
RHEUMATOID FACT SER NEPH-ACNC: <10 IU/ML (ref 0–13)

## 2025-01-04 PROCEDURE — 86200 CCP ANTIBODY: CPT

## 2025-01-04 PROCEDURE — 85730 THROMBOPLASTIN TIME PARTIAL: CPT

## 2025-01-04 PROCEDURE — 36415 COLL VENOUS BLD VENIPUNCTURE: CPT

## 2025-01-04 PROCEDURE — 86147 CARDIOLIPIN ANTIBODY EA IG: CPT

## 2025-01-04 PROCEDURE — 86431 RHEUMATOID FACTOR QUANT: CPT

## 2025-01-04 PROCEDURE — 85613 RUSSELL VIPER VENOM DILUTED: CPT

## 2025-01-04 PROCEDURE — 86225 DNA ANTIBODY NATIVE: CPT

## 2025-01-04 PROCEDURE — 85610 PROTHROMBIN TIME: CPT

## 2025-01-04 PROCEDURE — 86038 ANTINUCLEAR ANTIBODIES: CPT

## 2025-01-04 PROCEDURE — 85652 RBC SED RATE AUTOMATED: CPT

## 2025-01-06 LAB
ANA SER QL IA: NEGATIVE
CARDIOLIPIN IGA SER IA-ACNC: 6.2 APL (ref 0–14)
CARDIOLIPIN IGG SER IA-ACNC: 7 GPL (ref 0–10)
CARDIOLIPIN IGM SER IA-ACNC: 4.6 MPL (ref 0–10)
CCP AB SER IA-ACNC: 1.3 U/ML (ref 0–7)
DILUTE RUSSELL VIPER VENOM TIME: NORMAL
DSDNA IGG SER QL IA: 8.7 IU/ML
INR PPP: 1
NUCLEAR IGG SER IA-RTO: 0.2 U/ML
PARTIAL THROMBOPLASTIN TIME: 25 SEC (ref 21.3–31.3)
PROTHROMBIN TIME: 10.1 SEC (ref 9.4–12.6)

## 2025-01-07 ENCOUNTER — HOSPITAL ENCOUNTER (OUTPATIENT)
Dept: PHYSICAL THERAPY | Facility: CLINIC | Age: 65
Setting detail: THERAPIES SERIES
Discharge: HOME | End: 2025-01-07
Payer: COMMERCIAL

## 2025-01-07 PROCEDURE — 97162 PT EVAL MOD COMPLEX 30 MIN: CPT

## 2025-01-07 PROCEDURE — 97110 THERAPEUTIC EXERCISES: CPT

## 2025-01-07 NOTE — CONSULTS
Vasocompression      []  Cervical Traction      []  Other      Total Billable time 55 min         Time in: 1130      Time out: 1230    Electronically signed by: Hernandez Smith PT        Physician Signature:________________________________Date:__________________  By signing above or cosigning this note, I have reviewed this plan of care and certify a need for medically necessary rehabilitation services.     *PLEASE SIGN ABOVE AND FAX BACK ALL PAGES*

## 2025-01-08 RX ORDER — RABEPRAZOLE SODIUM 20 MG/1
20 TABLET, DELAYED RELEASE ORAL DAILY
Qty: 90 TABLET | Refills: 3 | Status: SHIPPED | OUTPATIENT
Start: 2025-01-08

## 2025-01-14 ENCOUNTER — APPOINTMENT (OUTPATIENT)
Dept: PHYSICAL THERAPY | Facility: CLINIC | Age: 65
End: 2025-01-14
Payer: COMMERCIAL

## 2025-01-14 DIAGNOSIS — G43.719 INTRACTABLE CHRONIC MIGRAINE WITHOUT AURA AND WITHOUT STATUS MIGRAINOSUS: ICD-10-CM

## 2025-01-14 RX ORDER — CLONAZEPAM 0.5 MG/1
TABLET ORAL
Qty: 60 TABLET | Refills: 0 | Status: SHIPPED | OUTPATIENT
Start: 2025-01-14 | End: 2025-02-13

## 2025-01-21 ENCOUNTER — APPOINTMENT (OUTPATIENT)
Dept: PHYSICAL THERAPY | Facility: CLINIC | Age: 65
End: 2025-01-21
Payer: COMMERCIAL

## 2025-01-28 ENCOUNTER — TELEPHONE (OUTPATIENT)
Dept: NEUROLOGY | Age: 65
End: 2025-01-28

## 2025-01-28 ENCOUNTER — APPOINTMENT (OUTPATIENT)
Dept: PHYSICAL THERAPY | Facility: CLINIC | Age: 65
End: 2025-01-28
Payer: COMMERCIAL

## 2025-01-28 RX ORDER — DULOXETIN HYDROCHLORIDE 30 MG/1
30 CAPSULE, DELAYED RELEASE ORAL DAILY
Qty: 90 CAPSULE | Refills: 3 | Status: SHIPPED | OUTPATIENT
Start: 2025-01-28

## 2025-02-13 DIAGNOSIS — G43.719 INTRACTABLE CHRONIC MIGRAINE WITHOUT AURA AND WITHOUT STATUS MIGRAINOSUS: ICD-10-CM

## 2025-02-13 RX ORDER — CLONAZEPAM 0.5 MG/1
TABLET ORAL
Qty: 60 TABLET | Refills: 0 | Status: SHIPPED | OUTPATIENT
Start: 2025-02-13 | End: 2025-03-15

## 2025-02-18 NOTE — DISCHARGE SUMMARY
[] [facility]  Outpatient Rehabilitation &  Therapy  [address]  [phone]  [phone] [] [facility]  Outpatient Rehabilitation &  Therapy  [address]  P: [phone]  F: [phone] [] [facility]  Outpatient Rehabilitation &  Therapy  [address]  P: [phone]  F: [phone] [x] [facility]  Outpatient Rehabilitation &  Therapy  [address]  [phone]  [phone] [] [facility]  Outpatient Rehabilitation &  Therapy  [address]   P: [phone]  F: [phone]  [] [facility]  Outpatient Rehabilitation &  Therapy  [address]  P: [phone]  F: [phone] [] [facility]  Outpatient Rehabilitation &  Therapy  [address]  P: [phone]  F: [phone] [] [facility]  Outpatient Rehabilitation &  Therapy  [address]  P: [phone]  F: [phone] [] [facility]  Outpatient Rehabilitation &  Therapy  [address]  P: [phone]  F: [phone]  [] [facility] Outpatient Rehabilitation &  Therapy  [address]  P: [phone]  F: [phone]     Physical Therapy Discharge Note    Date: 2025      Patient: Clarisa Lopez  : 1960  MRN: [id number]    Physician: Dr. Nayana eMjias                                Insurance: : BCBS; [place] yr; 60/60vs; no auth req; hard max; PT/OT/ST comb; $40 copay; 1300/0 met ded; 4250/4150 remaining OOP   Medical Diagnosis: muscle strain                 Rehab Codes: M54.2  Onset Date: 12/3/24               Next 's appt.: 3/2025     Date of only visit:  25                    Assessment:  STG: (to be met in 6 treatments)  ? Pain:<3/10 at the worst  ? ROM: with C spine pain in all directions  ? Strength: by 25% with R shoulder

## 2025-03-04 ENCOUNTER — OFFICE VISIT (OUTPATIENT)
Dept: FAMILY MEDICINE CLINIC | Age: 65
End: 2025-03-04
Payer: COMMERCIAL

## 2025-03-04 VITALS
HEART RATE: 60 BPM | WEIGHT: 110.6 LBS | TEMPERATURE: 97 F | SYSTOLIC BLOOD PRESSURE: 124 MMHG | OXYGEN SATURATION: 99 % | DIASTOLIC BLOOD PRESSURE: 88 MMHG | BODY MASS INDEX: 18.4 KG/M2

## 2025-03-04 DIAGNOSIS — M15.0 PRIMARY OSTEOARTHRITIS INVOLVING MULTIPLE JOINTS: Primary | ICD-10-CM

## 2025-03-04 DIAGNOSIS — G43.711 INTRACTABLE CHRONIC MIGRAINE WITHOUT AURA AND WITH STATUS MIGRAINOSUS: ICD-10-CM

## 2025-03-04 DIAGNOSIS — Z13.6 ENCOUNTER FOR LIPID SCREENING FOR CARDIOVASCULAR DISEASE: ICD-10-CM

## 2025-03-04 DIAGNOSIS — Z13.220 ENCOUNTER FOR LIPID SCREENING FOR CARDIOVASCULAR DISEASE: ICD-10-CM

## 2025-03-04 DIAGNOSIS — I10 ESSENTIAL HYPERTENSION: ICD-10-CM

## 2025-03-04 PROCEDURE — 3074F SYST BP LT 130 MM HG: CPT | Performed by: FAMILY MEDICINE

## 2025-03-04 PROCEDURE — 3079F DIAST BP 80-89 MM HG: CPT | Performed by: FAMILY MEDICINE

## 2025-03-04 PROCEDURE — 99213 OFFICE O/P EST LOW 20 MIN: CPT | Performed by: FAMILY MEDICINE

## 2025-03-04 RX ORDER — VERAPAMIL HYDROCHLORIDE 240 MG/1
240 TABLET, FILM COATED, EXTENDED RELEASE ORAL DAILY
Qty: 90 TABLET | Refills: 1 | Status: SHIPPED | OUTPATIENT
Start: 2025-03-04 | End: 2026-03-04

## 2025-03-04 RX ORDER — MELOXICAM 7.5 MG/1
7.5 TABLET ORAL DAILY
Qty: 30 TABLET | Refills: 3 | Status: SHIPPED | OUTPATIENT
Start: 2025-03-04

## 2025-03-04 SDOH — ECONOMIC STABILITY: FOOD INSECURITY: WITHIN THE PAST 12 MONTHS, THE FOOD YOU BOUGHT JUST DIDN'T LAST AND YOU DIDN'T HAVE MONEY TO GET MORE.: NEVER TRUE

## 2025-03-04 SDOH — ECONOMIC STABILITY: FOOD INSECURITY: WITHIN THE PAST 12 MONTHS, YOU WORRIED THAT YOUR FOOD WOULD RUN OUT BEFORE YOU GOT MONEY TO BUY MORE.: NEVER TRUE

## 2025-03-04 ASSESSMENT — PATIENT HEALTH QUESTIONNAIRE - PHQ9
2. FEELING DOWN, DEPRESSED OR HOPELESS: NOT AT ALL
SUM OF ALL RESPONSES TO PHQ QUESTIONS 1-9: 0
1. LITTLE INTEREST OR PLEASURE IN DOING THINGS: NOT AT ALL
SUM OF ALL RESPONSES TO PHQ QUESTIONS 1-9: 0

## 2025-03-04 NOTE — PROGRESS NOTES
General FM note    Clarisa Lopez is a 64 y.o. female who presents today for follow up on her  medical conditions as noted below.  Clarisa Lopez is c/o of   Chief Complaint   Patient presents with    Hypertension       Patient Active Problem List:     Intractable chronic migraine without aura and without status migrainosus     Essential hypertension     Erythrocytosis     Past Medical History:   Diagnosis Date    Acid reflux     Anemia     Hypertension     Migraine     Mitral valve prolapse     Osteoarthritis       Past Surgical History:   Procedure Laterality Date     SECTION      COLONOSCOPY      COLONOSCOPY  2018    Screening; with 1 biopsy    COLONOSCOPY N/A 2018    COLONOSCOPY WITH BIOPSY performed by Bonnie Pool MD at [facility] OR    ENDOSCOPY, COLON, DIAGNOSTIC      OTHER SURGICAL HISTORY      uterine ablation    OVARY REMOVAL      TOE SURGERY       Family History   Problem Relation Age of Onset    Neuropathy Mother     Migraines Mother     Heart Attack Father      Current Outpatient Medications   Medication Sig Dispense Refill    verapamil (CALAN SR) 240 MG extended release tablet Take 1 tablet by mouth daily 90 tablet 1    meloxicam (MOBIC) 7.5 MG tablet Take 1 tablet by mouth daily 30 tablet 3    clonazePAM (KLONOPIN) 0.5 MG tablet TAKE 1 TABLET BY MOUTH 2 TIMES A DAY AS NEEDED FOR ANXIETY **MAX 2 PER DAY** 60 tablet 0    DULoxetine (CYMBALTA) 30 MG extended release capsule TAKE 1 CAPSULE BY MOUTH DAILY 90 capsule 3    RABEprazole (ACIPHEX) 20 MG tablet Take 1 tablet by mouth daily 90 tablet 3    Budeson-Glycopyrrol-Formoterol (BREZTRI AEROSPHERE) 160-9-4.8 MCG/ACT AERO Lot [id number]  [id number] 1 each 0    benzonatate (TESSALON) 200 MG capsule Take 1 capsule by mouth 3 times daily as needed for Cough 30 capsule 1    tiZANidine (ZANAFLEX) 2 MG tablet TAKE 1 TABLET BY MOUTH EVERY DAY AT NIGHT 90 tablet 1    SUMAtriptan (IMITREX) 100 MG tablet TAKE 1 TABLET BY MOUTH DAILY AS NEEDED

## 2025-03-08 ENCOUNTER — HOSPITAL ENCOUNTER (OUTPATIENT)
Facility: CLINIC | Age: 65
Discharge: HOME | End: 2025-03-08
Payer: COMMERCIAL

## 2025-03-08 DIAGNOSIS — I10 ESSENTIAL HYPERTENSION: ICD-10-CM

## 2025-03-08 DIAGNOSIS — Z13.6 ENCOUNTER FOR LIPID SCREENING FOR CARDIOVASCULAR DISEASE: ICD-10-CM

## 2025-03-08 DIAGNOSIS — Z13.220 ENCOUNTER FOR LIPID SCREENING FOR CARDIOVASCULAR DISEASE: ICD-10-CM

## 2025-03-08 LAB
25(OH)D3 SERPL-MCNC: 42.1 NG/ML (ref 30–100)
ALBUMIN: 4.2 G/DL (ref 3.5–5.2)
ANION GAP SERPL CALCULATED.3IONS-SCNC: 8 MMOL/L (ref 9–16)
BUN SERPL-MCNC: 26 MG/DL (ref 8–23)
CALCIUM SERPL-MCNC: 9.5 MG/DL (ref 8.6–10.4)
CHLORIDE SERPL-SCNC: 104 MMOL/L (ref 98–107)
CHOLEST SERPL-MCNC: 213 MG/DL (ref 0–199)
CHOLESTEROL/HDL RATIO: 4.7
CO2 SERPL-SCNC: 28 MMOL/L (ref 20–31)
CREAT SERPL-MCNC: 1 MG/DL (ref 0.6–0.9)
GFR, ESTIMATED: 63 ML/MIN/1.73M2
GLUCOSE SERPL-MCNC: 82 MG/DL (ref 74–99)
HDLC SERPL-MCNC: 45 MG/DL
LDLC SERPL CALC-MCNC: 148 MG/DL (ref 0–100)
PHOSPHATE SERPL-MCNC: 3.2 MG/DL (ref 2.5–4.5)
POTASSIUM SERPL-SCNC: 4.5 MMOL/L (ref 3.7–5.3)
SODIUM SERPL-SCNC: 140 MMOL/L (ref 136–145)
TRIGL SERPL-MCNC: 100 MG/DL
VLDLC SERPL CALC-MCNC: 20 MG/DL (ref 1–30)

## 2025-03-08 PROCEDURE — 80069 RENAL FUNCTION PANEL: CPT

## 2025-03-08 PROCEDURE — 82306 VITAMIN D 25 HYDROXY: CPT

## 2025-03-08 PROCEDURE — 80061 LIPID PANEL: CPT

## 2025-03-08 PROCEDURE — 36415 COLL VENOUS BLD VENIPUNCTURE: CPT

## 2025-03-10 ENCOUNTER — RESULTS FOLLOW-UP (OUTPATIENT)
Dept: FAMILY MEDICINE CLINIC | Age: 65
End: 2025-03-10

## 2025-03-11 NOTE — PROGRESS NOTES
[facility] NEUROLOGY SPECIALIST  [address]  [place] [address]  Dept: [phone]    PATIENT NAME: Clarisa Lopez  PATIENT MRN: [id number]  PRIMARY CARE PHYSICIAN: Nayana Mejias MD    HPI:      Clarisa Lopez is a 64 y.o. female with a history of HTN, anemia, mitral valve prolapse, and OA, who presented initially on 1/24/2024 for evaluation of intractable headache.  Her history is summarized as follows:      Ms. Lopez is planning to do NEETU in February for neck pain that may be triggering the headaches.  She is currently using sumatriptan daily, most often in the evening. She also takes ibuprofen daily and is using diphenydramine to help her sleep at night.  She is taking a magnesium oxide supplement with 400 mg of this supplement daily as well. She enjoys running and is doing marathon training.  She typically has a headache when she starts running, but she pushes herself to go.  Headaches are milder or less noticeable during activity, but return when she stops.      Ms. Lopez tolerates verapamil ok, but does have to take a diuretic to manage leg swelling.  She notes that her home blood pressure has been elevated recently, in addition to today's blood pressure.     She has been seen by Dr. Chip Oreilly at [facility] as recently as 1/8/2024.  His notes are summarized as follows:      63-year-old right-handed white female with a past medical history significant for hypertension, mitral valve prolapse, increased hemoglobin,.gastroparesis and dyslipidemia who states that she has been troubled by headaches \"all my life\". More recently in the last 9 months they have been particularly problematic occurring virtually on a daily basis. Her headaches have never been associated with any neurologic symptomatology and their current troublesome nature follows a period beginning 2 - 3 years ago when she started using a natural remedy of some type which was very helpful and more

## 2025-03-12 ENCOUNTER — OFFICE VISIT (OUTPATIENT)
Dept: NEUROLOGY | Age: 65
End: 2025-03-12
Payer: COMMERCIAL

## 2025-03-12 VITALS
WEIGHT: 110.4 LBS | DIASTOLIC BLOOD PRESSURE: 80 MMHG | SYSTOLIC BLOOD PRESSURE: 117 MMHG | HEART RATE: 73 BPM | BODY MASS INDEX: 18.39 KG/M2 | HEIGHT: 65 IN

## 2025-03-12 DIAGNOSIS — G43.719 INTRACTABLE CHRONIC MIGRAINE WITHOUT AURA AND WITHOUT STATUS MIGRAINOSUS: Primary | ICD-10-CM

## 2025-03-12 DIAGNOSIS — R51.9 CHRONIC DAILY HEADACHE: ICD-10-CM

## 2025-03-12 PROCEDURE — 3079F DIAST BP 80-89 MM HG: CPT | Performed by: PSYCHIATRY & NEUROLOGY

## 2025-03-12 PROCEDURE — 99214 OFFICE O/P EST MOD 30 MIN: CPT | Performed by: PSYCHIATRY & NEUROLOGY

## 2025-03-12 PROCEDURE — 3074F SYST BP LT 130 MM HG: CPT | Performed by: PSYCHIATRY & NEUROLOGY

## 2025-03-17 DIAGNOSIS — G43.719 INTRACTABLE CHRONIC MIGRAINE WITHOUT AURA AND WITHOUT STATUS MIGRAINOSUS: ICD-10-CM

## 2025-03-17 RX ORDER — CLONAZEPAM 0.5 MG/1
TABLET ORAL
Qty: 60 TABLET | Refills: 0 | Status: SHIPPED | OUTPATIENT
Start: 2025-03-17 | End: 2025-04-16

## 2025-04-17 DIAGNOSIS — G43.719 INTRACTABLE CHRONIC MIGRAINE WITHOUT AURA AND WITHOUT STATUS MIGRAINOSUS: ICD-10-CM

## 2025-04-17 RX ORDER — CLONAZEPAM 0.5 MG/1
TABLET ORAL
Qty: 60 TABLET | Refills: 0 | Status: SHIPPED | OUTPATIENT
Start: 2025-04-17 | End: 2025-05-17

## 2025-04-17 RX ORDER — SUMATRIPTAN SUCCINATE 100 MG/1
100 TABLET ORAL DAILY PRN
Qty: 12 TABLET | Refills: 3 | Status: SHIPPED | OUTPATIENT
Start: 2025-04-17

## 2025-04-17 NOTE — TELEPHONE ENCOUNTER
Clarisa Lopez is calling to request a refill on the following medication(s):    Last Visit Date (If Applicable):  3/4/2025    Next Visit Date:    6/2/2025    Medication Request:  Requested Prescriptions     Pending Prescriptions Disp Refills    clonazePAM (KLONOPIN) 0.5 MG tablet 60 tablet 0     Sig: TAKE 1 TABLET BY MOUTH 2 TIMES A DAY AS NEEDED FOR ANXIETY **MAX 2 PER DAY**

## 2025-04-17 NOTE — TELEPHONE ENCOUNTER
Clarisa Lopez is calling to request a refill on the following medication(s):    Last Visit Date (If Applicable):  3/4/2025    Next Visit Date:    4/17/2025    Medication Request:  Requested Prescriptions     Pending Prescriptions Disp Refills    SUMAtriptan (IMITREX) 100 MG tablet [Pharmacy Med Name: SUMAtriptan SUCC 100 MG TABLET] 12 tablet 3     Sig: TAKE 1 TABLET BY MOUTH DAILY AS NEEDED FOR MIGRAINE

## 2025-04-28 ENCOUNTER — TELEPHONE (OUTPATIENT)
Dept: NEUROLOGY | Age: 65
End: 2025-04-28

## 2025-04-28 RX ORDER — NARATRIPTAN 2.5 MG/1
2.5 TABLET ORAL 2 TIMES DAILY
Qty: 6 TABLET | Refills: 0 | Status: SHIPPED | OUTPATIENT
Start: 2025-04-28 | End: 2025-05-01

## 2025-04-28 NOTE — TELEPHONE ENCOUNTER
Clarisa called into office stating she has been having a consistent headache for the past week. She stated that her Imitrex did not work. She also recently finished a steroid taper that did not relieve the headache either. She is leaving for [place] on Friday and would like to know if she can have a prescription for 3 days of naratriptan  before then. Please advise.      Pharmacy: [facility] [id number] - [place], [place] - [address] - P [phone] - F [phone] [[id number]]

## 2025-04-28 NOTE — TELEPHONE ENCOUNTER
Patient called back into office stating her pharmacy has not received the naratriptan prescription.

## 2025-05-13 ENCOUNTER — TELEPHONE (OUTPATIENT)
Dept: NEUROLOGY | Age: 65
End: 2025-05-13

## 2025-05-13 NOTE — TELEPHONE ENCOUNTER
Mrs. Lopez called the office this afternoon stating that effective May 1, 2025 she now has Medicare.  Patient states that she is on Emgality and needs a prior auth done.  Patient states that she doesn't need a refill for a couple weeks, however she was hoping to get this done before she needs a refill.  Writer advised that I would forward her request to the nurse doing the prior auths.  Writer also transferred her to the appointment desk so they could update her insurance information.

## 2025-05-16 DIAGNOSIS — G43.719 INTRACTABLE CHRONIC MIGRAINE WITHOUT AURA AND WITHOUT STATUS MIGRAINOSUS: ICD-10-CM

## 2025-05-16 RX ORDER — CLONAZEPAM 0.5 MG/1
TABLET ORAL
Qty: 60 TABLET | Refills: 0 | Status: SHIPPED | OUTPATIENT
Start: 2025-05-16 | End: 2025-06-15

## 2025-05-20 RX ORDER — TIZANIDINE 2 MG/1
TABLET ORAL
Qty: 90 TABLET | Refills: 1 | Status: SHIPPED | OUTPATIENT
Start: 2025-05-20

## 2025-05-20 NOTE — TELEPHONE ENCOUNTER
Clarisa Lopez is calling to request a refill on the following medication(s):    Last Visit Date (If Applicable):  Visit date not found    Next Visit Date:    Visit date not found    Medication Request:  Requested Prescriptions     Pending Prescriptions Disp Refills    tiZANidine (ZANAFLEX) 2 MG tablet 90 tablet 1     Sig: TAKE 1 TABLET BY MOUTH EVERY DAY AT NIGHT

## 2025-06-05 ENCOUNTER — OFFICE VISIT (OUTPATIENT)
Dept: FAMILY MEDICINE CLINIC | Age: 65
End: 2025-06-05

## 2025-06-05 VITALS
BODY MASS INDEX: 18.47 KG/M2 | TEMPERATURE: 97.1 F | OXYGEN SATURATION: 99 % | DIASTOLIC BLOOD PRESSURE: 88 MMHG | WEIGHT: 111 LBS | HEART RATE: 81 BPM | SYSTOLIC BLOOD PRESSURE: 138 MMHG

## 2025-06-05 DIAGNOSIS — M15.0 PRIMARY OSTEOARTHRITIS INVOLVING MULTIPLE JOINTS: ICD-10-CM

## 2025-06-05 DIAGNOSIS — I10 ESSENTIAL HYPERTENSION: ICD-10-CM

## 2025-06-05 DIAGNOSIS — F41.9 ANXIETY: Primary | ICD-10-CM

## 2025-06-05 RX ORDER — DULOXETIN HYDROCHLORIDE 60 MG/1
60 CAPSULE, DELAYED RELEASE ORAL DAILY
Qty: 90 CAPSULE | Refills: 1 | Status: SHIPPED | OUTPATIENT
Start: 2025-06-05

## 2025-06-05 RX ORDER — MELOXICAM 7.5 MG/1
7.5 TABLET ORAL DAILY
Qty: 90 TABLET | Refills: 0 | Status: SHIPPED | OUTPATIENT
Start: 2025-06-05

## 2025-06-05 ASSESSMENT — PATIENT HEALTH QUESTIONNAIRE - PHQ9
SUM OF ALL RESPONSES TO PHQ QUESTIONS 1-9: 0
SUM OF ALL RESPONSES TO PHQ QUESTIONS 1-9: 0
1. LITTLE INTEREST OR PLEASURE IN DOING THINGS: NOT AT ALL
2. FEELING DOWN, DEPRESSED OR HOPELESS: NOT AT ALL
SUM OF ALL RESPONSES TO PHQ QUESTIONS 1-9: 0
SUM OF ALL RESPONSES TO PHQ QUESTIONS 1-9: 0

## 2025-06-05 NOTE — PROGRESS NOTES
Dispense:  90 capsule     Refill:  1     Please send a replace/new response with 90-Day Supply if appropriate to maximize member benefit. Requesting 1 year supply.       Call or return to clinic prn if these symptoms worsen or fail to improve as anticipated.  I have reviewed the instructions with the patient, answering all questions to patient's satisfaction.      Clarisa received counseling on the following healthy behaviors: nutrition, exercise, and medication adherence  Reviewed prior labs and health maintenance.  Continue current medications, diet and exercise.  Discussed use, benefit, and side effects of prescribed medications. Barriers to medication compliance addressed.   Patient given educational materials - see patient instructions.    All patient questions answered.  Patient voiced understanding.      Electronically signed by Nayana Mejias MD on 6/6/2025 at 5:42 AM       (Please note that portions of this note were completed with a voice recognition program. Efforts were made to edit the dictations but occasionally words are mis-transcribed.)    The patient (or guardian, if applicable) and other individuals in attendance with the patient were advised that Artificial Intelligence will be utilized during this visit to record, process the conversation to generate a clinical note, and support improvement of the AI technology. The patient (or guardian, if applicable) and other individuals in attendance at the appointment consented to the use of AI, including the recording.

## 2025-06-13 DIAGNOSIS — G43.719 INTRACTABLE CHRONIC MIGRAINE WITHOUT AURA AND WITHOUT STATUS MIGRAINOSUS: ICD-10-CM

## 2025-06-13 RX ORDER — CLONAZEPAM 0.5 MG/1
TABLET ORAL
Qty: 60 TABLET | Refills: 0 | Status: SHIPPED | OUTPATIENT
Start: 2025-06-13 | End: 2025-07-13

## 2025-06-13 NOTE — TELEPHONE ENCOUNTER
Clarisa Lopez is calling to request a refill on the following medication(s):    Last Visit Date (If Applicable):  6/5/2025    Next Visit Date:    9/4/2025    Medication Request:  Requested Prescriptions     Pending Prescriptions Disp Refills    clonazePAM (KLONOPIN) 0.5 MG tablet 60 tablet 0     Sig: TAKE 1 TABLET BY MOUTH 2 TIMES A DAY AS NEEDED FOR ANXIETY **MAX 2 PER DAY**                Called pt regarding message. VV scheduled.

## 2025-07-01 ENCOUNTER — OFFICE VISIT (OUTPATIENT)
Dept: FAMILY MEDICINE CLINIC | Age: 65
End: 2025-07-01

## 2025-07-01 VITALS
DIASTOLIC BLOOD PRESSURE: 81 MMHG | WEIGHT: 112 LBS | HEART RATE: 74 BPM | TEMPERATURE: 97.2 F | SYSTOLIC BLOOD PRESSURE: 133 MMHG | OXYGEN SATURATION: 99 % | BODY MASS INDEX: 18.64 KG/M2

## 2025-07-01 DIAGNOSIS — K59.01 SLOW TRANSIT CONSTIPATION: ICD-10-CM

## 2025-07-01 DIAGNOSIS — K31.84 GASTROPARESIS: Primary | ICD-10-CM

## 2025-07-01 RX ORDER — PRUCALOPRIDE 2 MG/1
2 TABLET ORAL DAILY
Qty: 30 TABLET | Refills: 3 | Status: SHIPPED | OUTPATIENT
Start: 2025-07-01

## 2025-07-01 ASSESSMENT — PATIENT HEALTH QUESTIONNAIRE - PHQ9
SUM OF ALL RESPONSES TO PHQ QUESTIONS 1-9: 0
SUM OF ALL RESPONSES TO PHQ QUESTIONS 1-9: 0
2. FEELING DOWN, DEPRESSED OR HOPELESS: NOT AT ALL
1. LITTLE INTEREST OR PLEASURE IN DOING THINGS: NOT AT ALL
SUM OF ALL RESPONSES TO PHQ QUESTIONS 1-9: 0
SUM OF ALL RESPONSES TO PHQ QUESTIONS 1-9: 0

## 2025-07-01 NOTE — PROGRESS NOTES
General FM note    Clarisa Lopez is a 65 y.o. female who presents today for follow up on her  medical conditions as noted below.  Clarisa Lopez is c/o of   Chief Complaint   Patient presents with    Constipation       Patient Active Problem List:     Intractable chronic migraine without aura and without status migrainosus     Essential hypertension     Erythrocytosis     Anxiety     Past Medical History:   Diagnosis Date    Acid reflux     Anemia     Hypertension     Migraine     Mitral valve prolapse     Osteoarthritis       Past Surgical History:   Procedure Laterality Date     SECTION      COLONOSCOPY      COLONOSCOPY  2018    Screening; with 1 biopsy    COLONOSCOPY N/A 2018    COLONOSCOPY WITH BIOPSY performed by Bonnie Pool MD at [facility] OR    ENDOSCOPY, COLON, DIAGNOSTIC      OTHER SURGICAL HISTORY      uterine ablation    OVARY REMOVAL      TOE SURGERY       Family History   Problem Relation Age of Onset    Neuropathy Mother     Migraines Mother     Arthritis Mother     Asthma Mother     Atrial Fibrillation Mother     High Blood Pressure Mother     Heart Attack Father     Early Death Father     High Blood Pressure Father      Current Outpatient Medications   Medication Sig Dispense Refill    Prucalopride Succinate (MOTEGRITY) 2 MG tablet Take 1 tablet by mouth daily 30 tablet 3    clonazePAM (KLONOPIN) 0.5 MG tablet TAKE 1 TABLET BY MOUTH 2 TIMES A DAY AS NEEDED FOR ANXIETY **MAX 2 PER DAY** 60 tablet 0    meloxicam (MOBIC) 7.5 MG tablet Take 1 tablet by mouth daily 90 tablet 0    DULoxetine (CYMBALTA) 60 MG extended release capsule Take 1 capsule by mouth daily 90 capsule 1    tiZANidine (ZANAFLEX) 2 MG tablet TAKE 1 TABLET BY MOUTH EVERY DAY AT NIGHT 90 tablet 1    SUMAtriptan (IMITREX) 100 MG tablet TAKE 1 TABLET BY MOUTH DAILY AS NEEDED FOR MIGRAINE 12 tablet 3    verapamil (CALAN SR) 240 MG extended release tablet Take 1 tablet by mouth daily 90 tablet 1    RABEprazole

## 2025-07-11 RX ORDER — VALSARTAN 160 MG/1
160 TABLET ORAL DAILY
Qty: 90 TABLET | Refills: 3 | Status: SHIPPED | OUTPATIENT
Start: 2025-07-11

## 2025-07-15 DIAGNOSIS — G43.719 INTRACTABLE CHRONIC MIGRAINE WITHOUT AURA AND WITHOUT STATUS MIGRAINOSUS: ICD-10-CM

## 2025-07-16 RX ORDER — CLONAZEPAM 0.5 MG/1
TABLET ORAL
Qty: 60 TABLET | Refills: 0 | Status: SHIPPED | OUTPATIENT
Start: 2025-07-16 | End: 2025-08-14

## 2025-07-22 NOTE — PROGRESS NOTES
IX, X - symmetrical palate                                                                  XI - symmetrical shoulder shrug                                                       XII - tongue midline without atrophy or fasciculation      Motor function  Normal muscle bulk and tone; strength 5/5 on all 4 extremities, no pronator drift      Sensory function Intact to light touch, pinprick, vibration, proprioception on all 4 extremities      Cerebellar Intact fine motor movement. No involuntary movements or tremors. No ataxia or dysmetria on finger to nose or heel to shin testing      Reflex function DTR 2+ on bilateral UE and LE, symmetric.       Gait                   normal base and arm swing      DATA:   Labs:     7/3/2025: Ca 10.1    Component      Latest Ref Rng 3/8/2025   Glucose      74 - 99 mg/dL 82    BUN,BUNPL      8 - 23 mg/dL 26 (H)    Creatinine      0.6 - 0.9 mg/dL 1.0 (H)    Est, Glom Filt Rate      >60 mL/min/1.73m2 63    Calcium      8.6 - 10.4 mg/dL 9.5    Albumin      3.5 - 5.2 g/dL 4.2    Phosphorus      2.5 - 4.5 mg/dL 3.2    Sodium      136 - 145 mmol/L 140    Potassium      3.7 - 5.3 mmol/L 4.5    Chloride      98 - 107 mmol/L 104    CARBON DIOXIDE      20 - 31 mmol/L 28    Anion Gap      9 - 16 mmol/L 8 (L)    Cholesterol, Total      0 - 199 mg/dL 213 (H)    HDL Cholesterol      >40 mg/dL 45    LDL Cholesterol      0 - 100 mg/dL 148 (H)    Chol/HDL Ratio 4.7    Triglycerides      <150 mg/dL 100    VLDL      1 - 30 mg/dL 20    Vit D, 25-Hydroxy      30.0 - 100.0 ng/mL 42.1       Legend:  (H) High  (L) Low    ASSESSMENT / PLAN:   Clarisa Lopez is a 65 y.o. female, who presents today to follow-up regarding chronic intractable migraine without aura, as well as a history of medication overuse headache.  I recommend the following plan:      PLAN:      1.  Chronic migraine without aura, intractable:  -For preventative therapy, stop Emgality, which was denied by insurance but was effective ad

## 2025-07-23 ENCOUNTER — TELEPHONE (OUTPATIENT)
Dept: NEUROLOGY | Age: 65
End: 2025-07-23

## 2025-07-23 ENCOUNTER — OFFICE VISIT (OUTPATIENT)
Dept: NEUROLOGY | Age: 65
End: 2025-07-23
Payer: MEDICARE

## 2025-07-23 VITALS
WEIGHT: 111.6 LBS | SYSTOLIC BLOOD PRESSURE: 145 MMHG | BODY MASS INDEX: 18.59 KG/M2 | HEART RATE: 68 BPM | HEIGHT: 65 IN | DIASTOLIC BLOOD PRESSURE: 82 MMHG

## 2025-07-23 DIAGNOSIS — G43.719 INTRACTABLE CHRONIC MIGRAINE WITHOUT AURA AND WITHOUT STATUS MIGRAINOSUS: Primary | ICD-10-CM

## 2025-07-23 DIAGNOSIS — I10 ESSENTIAL HYPERTENSION: ICD-10-CM

## 2025-07-23 DIAGNOSIS — R51.9 CHRONIC DAILY HEADACHE: ICD-10-CM

## 2025-07-23 PROCEDURE — 99214 OFFICE O/P EST MOD 30 MIN: CPT | Performed by: PSYCHIATRY & NEUROLOGY

## 2025-07-23 PROCEDURE — G8427 DOCREV CUR MEDS BY ELIG CLIN: HCPCS | Performed by: PSYCHIATRY & NEUROLOGY

## 2025-07-23 PROCEDURE — G8420 CALC BMI NORM PARAMETERS: HCPCS | Performed by: PSYCHIATRY & NEUROLOGY

## 2025-07-23 PROCEDURE — 1036F TOBACCO NON-USER: CPT | Performed by: PSYCHIATRY & NEUROLOGY

## 2025-07-23 PROCEDURE — G8400 PT W/DXA NO RESULTS DOC: HCPCS | Performed by: PSYCHIATRY & NEUROLOGY

## 2025-07-23 PROCEDURE — 1090F PRES/ABSN URINE INCON ASSESS: CPT | Performed by: PSYCHIATRY & NEUROLOGY

## 2025-07-23 PROCEDURE — 3017F COLORECTAL CA SCREEN DOC REV: CPT | Performed by: PSYCHIATRY & NEUROLOGY

## 2025-07-23 PROCEDURE — 3079F DIAST BP 80-89 MM HG: CPT | Performed by: PSYCHIATRY & NEUROLOGY

## 2025-07-23 PROCEDURE — 3077F SYST BP >= 140 MM HG: CPT | Performed by: PSYCHIATRY & NEUROLOGY

## 2025-07-23 PROCEDURE — 1123F ACP DISCUSS/DSCN MKR DOCD: CPT | Performed by: PSYCHIATRY & NEUROLOGY

## 2025-07-23 RX ORDER — SODIUM CHLORIDE 0.9 % (FLUSH) 0.9 %
5-40 SYRINGE (ML) INJECTION PRN
OUTPATIENT
Start: 2025-07-23

## 2025-07-23 RX ORDER — ACETAMINOPHEN 325 MG/1
650 TABLET ORAL
OUTPATIENT
Start: 2025-07-23

## 2025-07-23 RX ORDER — DIPHENHYDRAMINE HYDROCHLORIDE 50 MG/ML
50 INJECTION, SOLUTION INTRAMUSCULAR; INTRAVENOUS
OUTPATIENT
Start: 2025-07-23

## 2025-07-23 RX ORDER — EPINEPHRINE 1 MG/ML
0.3 INJECTION, SOLUTION, CONCENTRATE INTRAVENOUS PRN
OUTPATIENT
Start: 2025-07-23

## 2025-07-23 RX ORDER — FAMOTIDINE 10 MG/ML
20 INJECTION, SOLUTION INTRAVENOUS
OUTPATIENT
Start: 2025-07-23

## 2025-07-23 RX ORDER — ALBUTEROL SULFATE 90 UG/1
4 INHALANT RESPIRATORY (INHALATION) PRN
OUTPATIENT
Start: 2025-07-23

## 2025-07-23 RX ORDER — SUMATRIPTAN SUCCINATE 100 MG/1
100 TABLET ORAL SEE ADMIN INSTRUCTIONS
Qty: 30 TABLET | Refills: 3 | Status: SHIPPED | OUTPATIENT
Start: 2025-07-23

## 2025-07-23 RX ORDER — HEPARIN SODIUM (PORCINE) LOCK FLUSH IV SOLN 100 UNIT/ML 100 UNIT/ML
500 SOLUTION INTRAVENOUS PRN
OUTPATIENT
Start: 2025-07-23

## 2025-07-23 RX ORDER — SODIUM CHLORIDE 9 MG/ML
5-250 INJECTION, SOLUTION INTRAVENOUS PRN
OUTPATIENT
Start: 2025-07-23

## 2025-07-23 RX ORDER — ONDANSETRON 2 MG/ML
8 INJECTION INTRAMUSCULAR; INTRAVENOUS
OUTPATIENT
Start: 2025-07-23

## 2025-07-23 RX ORDER — HYDROCORTISONE SODIUM SUCCINATE 100 MG/2ML
100 INJECTION INTRAMUSCULAR; INTRAVENOUS
OUTPATIENT
Start: 2025-07-23

## 2025-07-23 RX ORDER — SODIUM CHLORIDE 9 MG/ML
INJECTION, SOLUTION INTRAVENOUS PRN
OUTPATIENT
Start: 2025-07-23

## 2025-07-23 RX ORDER — ERENUMAB-AOOE 140 MG/ML
140 INJECTION, SOLUTION SUBCUTANEOUS
Qty: 1 ADJUSTABLE DOSE PRE-FILLED PEN SYRINGE | Refills: 0 | Status: SHIPPED | COMMUNITY
Start: 2025-07-23

## 2025-07-23 NOTE — TELEPHONE ENCOUNTER
Dr. Tang wants to start patient on Vyetpi. Start form completed, no labs needed prior. Therapy plan already entered and signed.

## 2025-07-25 ENCOUNTER — PATIENT MESSAGE (OUTPATIENT)
Dept: NEUROLOGY | Age: 65
End: 2025-07-25

## 2025-07-25 DIAGNOSIS — G43.719 INTRACTABLE CHRONIC MIGRAINE WITHOUT AURA AND WITHOUT STATUS MIGRAINOSUS: ICD-10-CM

## 2025-07-25 NOTE — TELEPHONE ENCOUNTER
Called patient, advised that no PA is needed, advised that patient can call to schedule, number provided.

## 2025-07-28 RX ORDER — SUMATRIPTAN SUCCINATE 100 MG/1
100 TABLET ORAL SEE ADMIN INSTRUCTIONS
Qty: 30 TABLET | Refills: 3 | Status: SHIPPED | OUTPATIENT
Start: 2025-07-28

## 2025-07-29 ENCOUNTER — HOSPITAL ENCOUNTER (OUTPATIENT)
Dept: INFUSION THERAPY | Facility: MEDICAL CENTER | Age: 65
Discharge: HOME | End: 2025-07-29
Payer: MEDICARE

## 2025-07-29 VITALS
BODY MASS INDEX: 18.94 KG/M2 | DIASTOLIC BLOOD PRESSURE: 82 MMHG | HEART RATE: 71 BPM | WEIGHT: 113.8 LBS | SYSTOLIC BLOOD PRESSURE: 143 MMHG | TEMPERATURE: 98.2 F | RESPIRATION RATE: 16 BRPM

## 2025-07-29 DIAGNOSIS — G43.719 INTRACTABLE CHRONIC MIGRAINE WITHOUT AURA AND WITHOUT STATUS MIGRAINOSUS: Primary | ICD-10-CM

## 2025-07-29 PROCEDURE — 6360000002 HC RX W HCPCS: Performed by: PSYCHIATRY & NEUROLOGY

## 2025-07-29 PROCEDURE — 96365 THER/PROPH/DIAG IV INF INIT: CPT

## 2025-07-29 PROCEDURE — 2580000003 HC RX 258: Performed by: PSYCHIATRY & NEUROLOGY

## 2025-07-29 RX ORDER — SODIUM CHLORIDE 9 MG/ML
INJECTION, SOLUTION INTRAVENOUS PRN
OUTPATIENT
Start: 2025-10-21

## 2025-07-29 RX ORDER — ACETAMINOPHEN 325 MG/1
650 TABLET ORAL
OUTPATIENT
Start: 2025-10-21

## 2025-07-29 RX ORDER — SODIUM CHLORIDE 9 MG/ML
5-250 INJECTION, SOLUTION INTRAVENOUS PRN
OUTPATIENT
Start: 2025-10-21

## 2025-07-29 RX ORDER — EPINEPHRINE 1 MG/ML
0.3 INJECTION, SOLUTION INTRAMUSCULAR; SUBCUTANEOUS PRN
OUTPATIENT
Start: 2025-10-21

## 2025-07-29 RX ORDER — ALBUTEROL SULFATE 90 UG/1
4 INHALANT RESPIRATORY (INHALATION) PRN
OUTPATIENT
Start: 2025-10-21

## 2025-07-29 RX ORDER — SODIUM CHLORIDE 9 MG/ML
5-250 INJECTION, SOLUTION INTRAVENOUS PRN
Status: DISCONTINUED | OUTPATIENT
Start: 2025-07-29 | End: 2025-07-30 | Stop reason: HOSPADM

## 2025-07-29 RX ORDER — ONDANSETRON 2 MG/ML
8 INJECTION INTRAMUSCULAR; INTRAVENOUS
OUTPATIENT
Start: 2025-10-21

## 2025-07-29 RX ORDER — SODIUM CHLORIDE 0.9 % (FLUSH) 0.9 %
5-40 SYRINGE (ML) INJECTION PRN
OUTPATIENT
Start: 2025-10-21

## 2025-07-29 RX ORDER — FAMOTIDINE 10 MG/ML
20 INJECTION, SOLUTION INTRAVENOUS
OUTPATIENT
Start: 2025-10-21

## 2025-07-29 RX ORDER — HYDROCORTISONE SODIUM SUCCINATE 100 MG/2ML
100 INJECTION INTRAMUSCULAR; INTRAVENOUS
OUTPATIENT
Start: 2025-10-21

## 2025-07-29 RX ORDER — HEPARIN 100 UNIT/ML
500 SYRINGE INTRAVENOUS PRN
OUTPATIENT
Start: 2025-10-21

## 2025-07-29 RX ORDER — DIPHENHYDRAMINE HYDROCHLORIDE 50 MG/ML
50 INJECTION, SOLUTION INTRAMUSCULAR; INTRAVENOUS
OUTPATIENT
Start: 2025-10-21

## 2025-07-29 RX ADMIN — SODIUM CHLORIDE 25 ML/HR: 0.9 INJECTION, SOLUTION INTRAVENOUS at 09:51

## 2025-07-29 RX ADMIN — EPTINEZUMAB-JJMR 100 MG: 100 INJECTION INTRAVENOUS at 09:51

## 2025-07-29 NOTE — PROGRESS NOTES
Patient here for first Vyepti.  Patient info sheet given.  Patient denies any complaints or concerns.  Patient denies any signs or symptoms of infection or recent vaccinations.  IV started with brisk blood return.  Vitals charted.  Vyepti infused to completion without incident.  Line flushed.  Observed 30 minutes post.  Intact IV removed and pressure dressing applied.  Discharged stable.  Aware of next treatment time.

## 2025-08-14 DIAGNOSIS — G43.719 INTRACTABLE CHRONIC MIGRAINE WITHOUT AURA AND WITHOUT STATUS MIGRAINOSUS: ICD-10-CM

## 2025-08-14 RX ORDER — CLONAZEPAM 0.5 MG/1
TABLET ORAL
Qty: 60 TABLET | Refills: 0 | Status: SHIPPED | OUTPATIENT
Start: 2025-08-14 | End: 2025-09-12

## 2025-08-26 ENCOUNTER — TELEPHONE (OUTPATIENT)
Dept: INFUSION THERAPY | Facility: MEDICAL CENTER | Age: 65
End: 2025-08-26

## 2025-09-03 SDOH — HEALTH STABILITY: PHYSICAL HEALTH: ON AVERAGE, HOW MANY MINUTES DO YOU ENGAGE IN EXERCISE AT THIS LEVEL?: 70 MIN

## 2025-09-03 SDOH — HEALTH STABILITY: PHYSICAL HEALTH: ON AVERAGE, HOW MANY DAYS PER WEEK DO YOU ENGAGE IN MODERATE TO STRENUOUS EXERCISE (LIKE A BRISK WALK)?: 7 DAYS

## 2025-09-03 ASSESSMENT — LIFESTYLE VARIABLES
HOW OFTEN DO YOU HAVE SIX OR MORE DRINKS ON ONE OCCASION: 1
HOW OFTEN DO YOU HAVE A DRINK CONTAINING ALCOHOL: 1
HOW OFTEN DO YOU HAVE A DRINK CONTAINING ALCOHOL: NEVER
HOW MANY STANDARD DRINKS CONTAINING ALCOHOL DO YOU HAVE ON A TYPICAL DAY: PATIENT DOES NOT DRINK
HOW MANY STANDARD DRINKS CONTAINING ALCOHOL DO YOU HAVE ON A TYPICAL DAY: 0

## 2025-09-03 ASSESSMENT — PATIENT HEALTH QUESTIONNAIRE - PHQ9
SUM OF ALL RESPONSES TO PHQ QUESTIONS 1-9: 0
1. LITTLE INTEREST OR PLEASURE IN DOING THINGS: NOT AT ALL
SUM OF ALL RESPONSES TO PHQ QUESTIONS 1-9: 0
2. FEELING DOWN, DEPRESSED OR HOPELESS: NOT AT ALL

## 2025-09-04 ENCOUNTER — OFFICE VISIT (OUTPATIENT)
Dept: FAMILY MEDICINE CLINIC | Age: 65
End: 2025-09-04
Payer: MEDICARE

## 2025-09-04 ENCOUNTER — HOSPITAL ENCOUNTER (OUTPATIENT)
Dept: GENERAL RADIOLOGY | Facility: CLINIC | Age: 65
Discharge: HOME | End: 2025-09-06
Payer: MEDICARE

## 2025-09-04 VITALS
HEART RATE: 80 BPM | DIASTOLIC BLOOD PRESSURE: 84 MMHG | HEIGHT: 65 IN | OXYGEN SATURATION: 99 % | SYSTOLIC BLOOD PRESSURE: 138 MMHG | BODY MASS INDEX: 18.63 KG/M2 | WEIGHT: 111.8 LBS | TEMPERATURE: 97.3 F

## 2025-09-04 DIAGNOSIS — M25.541 ARTHRALGIA OF BOTH HANDS: ICD-10-CM

## 2025-09-04 DIAGNOSIS — Z00.00 WELCOME TO MEDICARE PREVENTIVE VISIT: Primary | ICD-10-CM

## 2025-09-04 DIAGNOSIS — F41.9 ANXIETY: ICD-10-CM

## 2025-09-04 DIAGNOSIS — M25.542 ARTHRALGIA OF BOTH HANDS: ICD-10-CM

## 2025-09-04 DIAGNOSIS — I10 ESSENTIAL HYPERTENSION: ICD-10-CM

## 2025-09-04 DIAGNOSIS — Z23 NEED FOR PROPHYLACTIC VACCINATION AGAINST STREPTOCOCCUS PNEUMONIAE (PNEUMOCOCCUS): ICD-10-CM

## 2025-09-04 PROCEDURE — 73130 X-RAY EXAM OF HAND: CPT

## 2025-09-04 PROCEDURE — 99213 OFFICE O/P EST LOW 20 MIN: CPT | Performed by: FAMILY MEDICINE

## 2025-09-04 PROCEDURE — 1036F TOBACCO NON-USER: CPT | Performed by: FAMILY MEDICINE

## 2025-09-04 PROCEDURE — 1090F PRES/ABSN URINE INCON ASSESS: CPT | Performed by: FAMILY MEDICINE

## 2025-09-04 PROCEDURE — G8420 CALC BMI NORM PARAMETERS: HCPCS | Performed by: FAMILY MEDICINE

## 2025-09-04 PROCEDURE — 3075F SYST BP GE 130 - 139MM HG: CPT | Performed by: FAMILY MEDICINE

## 2025-09-04 PROCEDURE — 1123F ACP DISCUSS/DSCN MKR DOCD: CPT | Performed by: FAMILY MEDICINE

## 2025-09-04 PROCEDURE — 3017F COLORECTAL CA SCREEN DOC REV: CPT | Performed by: FAMILY MEDICINE

## 2025-09-04 PROCEDURE — G8427 DOCREV CUR MEDS BY ELIG CLIN: HCPCS | Performed by: FAMILY MEDICINE

## 2025-09-04 PROCEDURE — G0402 INITIAL PREVENTIVE EXAM: HCPCS | Performed by: FAMILY MEDICINE

## 2025-09-04 PROCEDURE — G8400 PT W/DXA NO RESULTS DOC: HCPCS | Performed by: FAMILY MEDICINE

## 2025-09-04 PROCEDURE — 3079F DIAST BP 80-89 MM HG: CPT | Performed by: FAMILY MEDICINE

## 2025-09-04 RX ORDER — VALSARTAN 160 MG/1
160 TABLET ORAL DAILY
Qty: 90 TABLET | Refills: 0 | Status: SHIPPED | OUTPATIENT
Start: 2025-09-04

## 2025-09-04 ASSESSMENT — VISUAL ACUITY
OD_CC: 20/30
OS_CC: 20/25

## (undated) DEVICE — BASIN EMSIS 700ML GRAPHITE PLAS KID SHP GRAD

## (undated) DEVICE — GAUZE,SPONGE,4"X4",16PLY,STRL,LF,10/TRAY: Brand: MEDLINE

## (undated) DEVICE — Device: Brand: DEFENDO VALVE AND CONNECTOR KIT

## (undated) DEVICE — SYRINGE MED 50ML LUERLOCK TIP

## (undated) DEVICE — FORCEPS BX L240CM WRK CHN 2.8MM STD CAP W/ NDL MIC MESH

## (undated) DEVICE — 9165 UNIVERSAL PATIENT PLATE: Brand: 3M™

## (undated) DEVICE — TUBING, SUCTION, 1/4" X 12', STRAIGHT: Brand: MEDLINE

## (undated) DEVICE — CANNULA NSL AD TBNG L7FT PVC STR NONFLARED PRNG O2 DEL W STD